# Patient Record
Sex: FEMALE | Race: BLACK OR AFRICAN AMERICAN | Employment: OTHER | URBAN - METROPOLITAN AREA
[De-identification: names, ages, dates, MRNs, and addresses within clinical notes are randomized per-mention and may not be internally consistent; named-entity substitution may affect disease eponyms.]

---

## 2017-01-09 RX ORDER — TRAMADOL HYDROCHLORIDE 50 MG/1
TABLET ORAL
Qty: 90 TAB | Refills: 2 | Status: SHIPPED | OUTPATIENT
Start: 2017-01-09 | End: 2017-02-13 | Stop reason: SDUPTHER

## 2017-01-09 NOTE — TELEPHONE ENCOUNTER
In Basket:   Lucy (pt) requesting Rx refill for: traMADol (ULTRAM) 50 mg tablet. Pt requesting to have Rx sent to [address] [place] [address] (on file).  Pt contact # [phone]

## 2017-02-13 RX ORDER — TRAMADOL HYDROCHLORIDE 50 MG/1
TABLET ORAL
Qty: 90 TAB | Refills: 2 | Status: SHIPPED | OUTPATIENT
Start: 2017-02-13 | End: 2017-05-15 | Stop reason: SDUPTHER

## 2017-02-14 ENCOUNTER — OFFICE VISIT (OUTPATIENT)
Dept: INTERNAL MEDICINE CLINIC | Age: 82
End: 2017-02-14

## 2017-02-14 ENCOUNTER — TELEPHONE (OUTPATIENT)
Dept: INTERNAL MEDICINE CLINIC | Age: 82
End: 2017-02-14

## 2017-02-14 VITALS
WEIGHT: 137.8 LBS | HEART RATE: 94 BPM | DIASTOLIC BLOOD PRESSURE: 113 MMHG | BODY MASS INDEX: 24.41 KG/M2 | OXYGEN SATURATION: 93 % | TEMPERATURE: 97.9 F | SYSTOLIC BLOOD PRESSURE: 170 MMHG | HEIGHT: 63 IN | RESPIRATION RATE: 18 BRPM

## 2017-02-14 DIAGNOSIS — Z13.39 SCREENING FOR ALCOHOLISM: ICD-10-CM

## 2017-02-14 DIAGNOSIS — G89.29 CHRONIC MIDLINE LOW BACK PAIN WITHOUT SCIATICA: ICD-10-CM

## 2017-02-14 DIAGNOSIS — K80.21 CALCULUS OF GALLBLADDER WITH BILIARY OBSTRUCTION BUT WITHOUT CHOLECYSTITIS: ICD-10-CM

## 2017-02-14 DIAGNOSIS — M81.0 OSTEOPOROSIS: ICD-10-CM

## 2017-02-14 DIAGNOSIS — M17.0 PRIMARY OSTEOARTHRITIS OF BOTH KNEES: ICD-10-CM

## 2017-02-14 DIAGNOSIS — R79.9 ABNORMAL FINDING OF BLOOD CHEMISTRY: ICD-10-CM

## 2017-02-14 DIAGNOSIS — I10 ESSENTIAL HYPERTENSION: ICD-10-CM

## 2017-02-14 DIAGNOSIS — J45.40 MODERATE PERSISTENT ASTHMA WITHOUT COMPLICATION: ICD-10-CM

## 2017-02-14 DIAGNOSIS — M65.30 TRIGGER FINGER OF RIGHT HAND, UNSPECIFIED FINGER: ICD-10-CM

## 2017-02-14 DIAGNOSIS — Z00.00 ROUTINE GENERAL MEDICAL EXAMINATION AT A HEALTH CARE FACILITY: Primary | ICD-10-CM

## 2017-02-14 DIAGNOSIS — R73.03 PREDIABETES: ICD-10-CM

## 2017-02-14 DIAGNOSIS — E78.5 DYSLIPIDEMIA: ICD-10-CM

## 2017-02-14 DIAGNOSIS — M54.50 CHRONIC MIDLINE LOW BACK PAIN WITHOUT SCIATICA: ICD-10-CM

## 2017-02-14 RX ORDER — TIZANIDINE 4 MG/1
TABLET ORAL
Refills: 2 | COMMUNITY
Start: 2017-01-18 | End: 2017-05-15 | Stop reason: ALTCHOICE

## 2017-02-14 NOTE — MR AVS SNAPSHOT
Visit Information Date & Time Provider Department Dept. Phone Encounter #  
 2/14/2017  9:30 AM [facility] [id number] Upcoming Health Maintenance Date Due DTaP/Tdap/Td series (1 - Tdap) 12/7/1954 MEDICARE YEARLY EXAM 2/8/2017 Pneumococcal 65+ Low/Medium Risk (2 of 2 - PCV13) 10/4/2017 GLAUCOMA SCREENING Q2Y 3/24/2018 Allergies as of 2/14/2017  Review Complete On: 2/14/2017 By: Rut Abad MD  
  
 Severity Noted Reaction Type Reactions Crestor [Rosuvastatin]  05/25/2016    Swelling Current Immunizations  Never Reviewed Name Date Influenza High Dose Vaccine PF 10/4/2016 Pneumococcal Polysaccharide (PPSV-23) 10/4/2016 Zoster Vaccine, Live 10/4/2016 Not reviewed this visit You Were Diagnosed With   
  
 Codes Comments Routine general medical examination at a health care facility    -  Primary ICD-10-CM: Z00.00 ICD-9-CM: V70.0 Screening for alcoholism     ICD-10-CM: Z13.89 ICD-9-CM: V79.1 Essential hypertension     ICD-10-CM: I10 
ICD-9-CM: 401.9 Moderate persistent asthma without complication     OME-63-VM: J45.40 ICD-9-CM: 493.90 Chronic midline low back pain without sciatica     ICD-10-CM: M54.5, G89.29 ICD-9-CM: 724.2, 338.29 Osteoporosis     ICD-10-CM: M81.0 ICD-9-CM: 733.00 Calculus of gallbladder with biliary obstruction but without cholecystitis     ICD-10-CM: K80.21 ICD-9-CM: 574.21 Dyslipidemia     ICD-10-CM: E78.5 ICD-9-CM: 272.4 Prediabetes     ICD-10-CM: R73.03 
ICD-9-CM: 790.29 Primary osteoarthritis of both knees     ICD-10-CM: M17.0 ICD-9-CM: 715.16 Abnormal finding of blood chemistry     ICD-10-CM: R79.9 ICD-9-CM: 790.6 Vitals BP Pulse Temp Resp Height(growth percentile) Weight(growth percentile)  (!) 170/113 (BP 1 Location: Right arm, BP Patient Position: Sitting) 94 97.9 °F (36.6 °C) (Oral) 18 5' 3\" (1.6 m) 137 lb 12.8 oz (62.5 kg) SpO2 BMI Smoking Status 93% 24.41 kg/m2 Never Smoker Vitals History BMI and BSA Data Body Mass Index Body Surface Area  
 24.41 kg/m 2 1.67 m 2 Preferred Pharmacy Pharmacy Name Phone [facility] [address] [place] [address] [phone] Your Updated Medication List  
  
   
This list is accurate as of: 2/14/17 12:11 PM.  Always use your most recent med list.  
  
  
  
  
 * ADVAIR DISKUS 500-50 mcg/dose diskus inhaler Generic drug:  fluticasone-salmeterol Take 1 Puff by inhalation two (2) times a day. * fluticasone-salmeterol 250-50 mcg/dose diskus inhaler Commonly known as:  ADVAIR Take 1 Puff by inhalation two (2) times a day. * albuterol 90 mcg/actuation inhaler Commonly known as:  PROVENTIL HFA, VENTOLIN HFA, PROAIR HFA Take 2 Puffs by inhalation every four (4) hours as needed for Wheezing. * albuterol 2.5 mg /3 mL (0.083 %) nebulizer solution Commonly known as:  PROVENTIL VENTOLIN  
3 mL by Nebulization route every four (4) hours as needed for Wheezing. alendronate 70 mg tablet Commonly known as:  FOSAMAX Take 1 Tab by mouth every Sunday. amLODIPine 5 mg tablet Commonly known as:  Ignacio Chafe Take 1 Tab by mouth daily. atenolol 25 mg tablet Commonly known as:  TENORMIN Take 1 Tab by mouth daily. Diphth, Pertus(Acell), Tetanus 2.5-8-5 Lf-mcg-Lf/0.5mL Syrg Commonly known as:  BOOSTRIX TDAP  
0.5 mL by IntraMUSCular route once for 1 dose. ferrous sulfate 325 mg (65 mg iron) tablet Take 1 Tab by mouth Daily (before breakfast). fluticasone 50 mcg/actuation nasal spray Commonly known as:  Dash Jacquelyn 2 Sprays by Both Nostrils route daily. hydrALAZINE 25 mg tablet Commonly known as:  APRESOLINE  
  
 levocetirizine 5 mg tablet Commonly known as:  Fernando Majudes Take 1 Tab by mouth daily as needed for Allergies. * levothyroxine 88 mcg tablet Commonly known as:  SYNTHROID * levothyroxine 100 mcg tablet Commonly known as:  SYNTHROID Take 1 Tab by mouth Daily (before breakfast). montelukast 10 mg tablet Commonly known as:  SINGULAIR Take 1 Tab by mouth daily. traMADol 50 mg tablet Commonly known as:  ULTRAM  
TAKE 1 TABLET BY MOUTH EVERY 8 HOURS AS NEEDED  
  
 traZODone 50 mg tablet Commonly known as:  DESYREL  
  
 VIRTUSSIN -10 mg/5 mL solution Generic drug:  guaiFENesin-codeine TAKE 5MLS BY MOUTH QID PRN FOR COUGH * Notice: This list has 6 medication(s) that are the same as other medications prescribed for you. Read the directions carefully, and ask your doctor or other care provider to review them with you. Prescriptions Sent to Pharmacy Refills Sabino Torres,, Tetanus (BOOSTRIX TDAP) 2.5-8-5 Lf-mcg-Lf/0.5mL syrg 0 Si.5 mL by IntraMUSCular route once for 1 dose. Class: Normal  
 Pharmacy: [facility] [address] [facility] Ph #: [phone] Route: IntraMUSCular We Performed the Following CBC WITH AUTOMATED DIFF [07558 CPT(R)] HEMOGLOBIN A1C WITH EAG [34944 CPT(R)] LIPID PANEL [88614 CPT(R)] METABOLIC PANEL, COMPREHENSIVE [11972 CPT(R)] SC COLLECTION VENOUS BLOOD,VENIPUNCTURE S5304310 CPT(R)] TSH 3RD GENERATION [41818 CPT(R)] URINALYSIS W/ RFLX MICROSCOPIC [83573 CPT(R)] Patient Instructions Trigger Finger: Care Instructions Your Care Instructions A trigger finger is a finger stuck in a bent position. The bent finger usually straightens out on its own. A trigger finger can be painful, but it normally is not a serious problem. Trigger fingers seem to occur more in some groups of people.  These include people who have diabetes or arthritis or who have injured their hands in the past. This problem also occurs in musicians and people who  tools often. Rest, exercises, and other things you can do at home may help your trigger finger relax so that it can bend as it should. You may get a corticosteroid shot. This can reduce swelling and pain. Your doctor may put a splint on your finger. This will give your finger some rest and avoid irritating the joint. You may need surgery if the finger keeps locking in a bent position. Follow-up care is a key part of your treatment and safety. Be sure to make and go to all appointments, and call your doctor if you are having problems. It's also a good idea to know your test results and keep a list of the medicines you take. How can you care for yourself at home? · If your doctor put a splint on your finger, wear the splint as directed. Do not remove it until your doctor says you can. · You may need to change your activities to avoid movements that irritate the finger. · If your finger is swollen, put ice or a cold pack on your finger for 10 to 20 minutes at a time. Try to do this every 1 to 2 hours for the next 3 days (when you are awake) or until the swelling goes down. Put a thin cloth between the ice and your skin. · Prop up your hand on a pillow when you ice it or anytime you sit or lie down during the next 3 days. Try to keep it above the level of your heart. This will help reduce swelling. · Take your medicines exactly as prescribed. Call your doctor if you think you are having a problem with your medicine. · Ask your doctor if you can take an over-the-counter pain medicine, such as acetaminophen (Tylenol), ibuprofen (Advil, Motrin), or naproxen (Aleve). Be safe with medicines. Read and follow all instructions on the label. · If your doctor recommends exercises, do them as directed. When should you call for help? Call your doctor now or seek immediate medical care if: 
· Your finger locks in a bent position and will not straighten. Watch closely for changes in your health, and be sure to contact your doctor if: 
· You do not get better as expected. Where can you learn more? Go to http://rudy-christine.info/. Enter M826 in the search box to learn more about \"Trigger Finger: Care Instructions. \" Current as of: May 23, 2016 Content Version: 11.1 © 0115-9057 [employer]. Care instructions adapted under license by [employer] (which disclaims liability or warranty for this information). If you have questions about a medical condition or this instruction, always ask your healthcare professional. [address] any warranty or liability for your use of this information. Introducing [facility]! Cathy Daniels introduces [employer] patient portal. Now you can access parts of your medical record, email your doctor's office, and request medication refills online. 1. In your internet browser, go to [email or web address] 2. Click on the First Time User? Click Here link in the Sign In box. You will see the New Member Sign Up page. 3. Enter your [employer] Access Code exactly as it appears below. You will not need to use this code after youve completed the sign-up process. If you do not sign up before the expiration date, you must request a new code. · [employer] Access Code: [email or web address] Expires: 5/15/2017 12:11 PM 
 
4. Enter the last four digits of your Social Security Number (xxxx) and Date of Birth (mm/dd/yyyy) as indicated and click Submit. You will be taken to the next sign-up page. 5. Create a [employer] ID. This will be your [employer] login ID and cannot be changed, so think of one that is secure and easy to remember. 6. Create a [employer] password. You can change your password at any time. 7. Enter your Password Reset Question and Answer. This can be used at a later time if you forget your password. 8. Enter your e-mail address. You will receive e-mail notification when new information is available in [address] 9. Click Sign Up. You can now view and download portions of your medical record. 10. Click the Download Summary menu link to download a portable copy of your medical information. If you have questions, please visit the Frequently Asked Questions section of the [employer] website. Remember, [employer] is NOT to be used for urgent needs. For medical emergencies, dial 911. Now available from your iPhone and Android! Please provide this summary of care documentation to your next provider. Your primary care clinician is listed as [facility]. If you have any questions after today's visit, please call [phone].

## 2017-02-14 NOTE — PROGRESS NOTES
1. Have you been to the ER, urgent care clinic since your last visit? Hospitalized since your last visit? No    2. Have you seen or consulted any other health care providers outside of the [address] [place] [address] since your last visit? Include any pap smears or colon screening. No    This is a Subsequent Medicare Annual Wellness Visit providing Personalized Prevention Plan Services (PPPS) (Performed 12 months after initial AWV and PPPS )    I have reviewed the patient's medical history in detail and updated the computerized patient record. HistoryPatient returns today ambulatory, alert and appropriate and has the capacity to give an accurate history. Patient complains of some triggering of her right thumb and index finger. She also complains of pain in her left hip. Past Medical History   Diagnosis Date    Asthma     Bereavement 3/30/16     65 yo con  - multiple myleoma     Cholelithiasis     Cough     Dyslipidemia     Hypertension     Low back pain 16    Osteoarthritis     Osteoporosis 16    Prediabetes     S/P colonoscopy 7/15     vcu    Wedge compression fx of second thoracic vertebra with routine healing 16     t11      Past Surgical History   Procedure Laterality Date    Hx gyn      Hx colonoscopy       Current Outpatient Prescriptions   Medication Sig Dispense Refill    VIRTUSSIN AC  mg/5 mL solution TAKE 5MLS BY MOUTH QID PRN FOR COUGH  2    traMADol (ULTRAM) 50 mg tablet TAKE 1 TABLET BY MOUTH EVERY 8 HOURS AS NEEDED 90 Tab 2    albuterol (PROVENTIL VENTOLIN) 2.5 mg /3 mL (0.083 %) nebulizer solution 3 mL by Nebulization route every four (4) hours as needed for Wheezing. 150 Each 11    fluticasone-salmeterol (ADVAIR) 250-50 mcg/dose diskus inhaler Take 1 Puff by inhalation two (2) times a day. 1 Inhaler 11    hydrALAZINE (APRESOLINE) 25 mg tablet   9    levocetirizine (XYZAL) 5 mg tablet Take 1 Tab by mouth daily as needed for Allergies. 30 Tab 11    levothyroxine (SYNTHROID) 88 mcg tablet       traZODone (DESYREL) 50 mg tablet       alendronate (FOSAMAX) 70 mg tablet Take 1 Tab by mouth every Sunday. 4 Tab 11    montelukast (SINGULAIR) 10 mg tablet Take 1 Tab by mouth daily. 30 Tab 11    atenolol (TENORMIN) 25 mg tablet Take 1 Tab by mouth daily. 30 Tab 11    levothyroxine (SYNTHROID) 100 mcg tablet Take 1 Tab by mouth Daily (before breakfast). 30 Tab 11    ADVAIR DISKUS 500-50 mcg/dose diskus inhaler Take 1 Puff by inhalation two (2) times a day. 3 Inhaler 11    ferrous sulfate 325 mg (65 mg iron) tablet Take 1 Tab by mouth Daily (before breakfast). 90 Tab 11    fluticasone (FLONASE) 50 mcg/actuation nasal spray 2 Sprays by Both Nostrils route daily. 3 Bottle 11    amLODIPine (NORVASC) 5 mg tablet Take 1 Tab by mouth daily. 90 Tab 11    albuterol (PROVENTIL HFA, VENTOLIN HFA, PROAIR HFA) 90 mcg/actuation inhaler Take 2 Puffs by inhalation every four (4) hours as needed for Wheezing. 3 Inhaler 11     Allergies   Allergen Reactions    Crestor [Rosuvastatin] Swelling     Family History   Problem Relation Age of Onset    Family history unknown: Yes    Cancer Son      Social History   Substance Use Topics    Smoking status: Never Smoker    Smokeless tobacco: Not on file    Alcohol use No     Patient Active Problem List   Diagnosis Code    Hypertension I10    Asthma J45.909    S/P colonoscopy Z98.890    Low back pain M54.5    ACP (advance care planning) Z71.89    Osteoporosis M81.0    Cough R05    Cholelithiasis K80.20    Dyslipidemia E78.5    Prediabetes R73.03    Osteoarthritis M19.90       Depression Risk Factor Screening:     PHQ 2 / 9, over the last two weeks 2/14/2017   Little interest or pleasure in doing things Not at all   Feeling down, depressed or hopeless Not at all   Total Score PHQ 2 0     Alcohol Risk Factor Screening:    On any occasion during the past 3 months, have you had more than 3 drinks containing alcohol? No    Do you average more than 7 drinks per week? No      Functional Ability and Level of Safety:     Hearing Loss   borderline normal-to-mild    Activities of Daily Living   Self-care. Requires assistance with: no ADLs    Fall Risk     Fall Risk Assessment, last 12 mths 2/14/2017   Able to walk? Yes   Fall in past 12 months? No     Abuse Screen   None  Patient is not abused    Review of Systems   A comprehensive review of systems was negative except for that written in the HPI. Physical Examination     Evaluation of Cognitive Function:  Mood/affect:  neutral  Appearance: age appropriate  Family member/caregiver input:     Visit Vitals    BP (!) 170/113 (BP 1 Location: Right arm, BP Patient Position: Sitting)    Pulse 94    Temp 97.9 °F (36.6 °C) (Oral)    Resp 18    Ht 5' 3\" (1.6 m)    Wt 137 lb 12.8 oz (62.5 kg)    SpO2 93%    BMI 24.41 kg/m2     General:  Alert, cooperative, no distress, appears stated age. Head:  Normocephalic, without obvious abnormality, atraumatic. Eyes:  Conjunctivae/corneas clear. PERRL, EOMs intact. Fundi benign. Ears:  Normal TMs and external ear canals both ears. Nose: Nares normal. Septum midline. Mucosa normal. No drainage or sinus tenderness. Throat: Lips, mucosa, and tongue normal. Teeth and gums normal.   Neck: Supple, symmetrical, trachea midline, no adenopathy, thyroid: no enlargement/tenderness/nodules, no carotid bruit and no JVD. Back:   Symmetric, no curvature. ROM normal. No CVA tenderness. Lungs:   Clear to auscultation bilaterally. Chest wall:  No tenderness or deformity. Heart:  Regular rate and rhythm, S1, S2 normal, no murmur, click, rub or gallop. Breast Exam:  na   Abdomen:   Soft, non-tender. Bowel sounds normal. No masses,  No organomegaly. Genitalia:  na   Rectal:     Extremities: Extremities normal, atraumatic, no cyanosis or edema. Pulses: 2+ and symmetric all extremities.    Skin: Skin color, texture, turgor normal. No rashes or lesions. Lymph nodes: Cervical, supraclavicular, and axillary nodes normal.   Neurologic: CNII-XII intact. Normal strength, sensation and reflexes throughout. Patient Care Team:  Nancy Hernandez MD as PCP - General (Internal Medicine)    Advice/Referrals/Counseling   Education and counseling provided:  Are appropriate based on today's review and evaluation    Assessment/Plan       ICD-10-CM ICD-9-CM    1. Routine general medical examination at a health care facility Z00.00 V70.0    2. Screening for alcoholism Z13.89 V79.1    3. Essential hypertension I10 401.9 Diphth, Pertus,Acell,, Tetanus (BOOSTRIX TDAP) 2.5-8-5 Lf-mcg-Lf/0.5mL syrg      URINALYSIS W/ RFLX MICROSCOPIC      CBC WITH AUTOMATED DIFF      METABOLIC PANEL, COMPREHENSIVE      LIPID PANEL      TSH 3RD GENERATION      NH COLLECTION VENOUS BLOOD,VENIPUNCTURE      HEMOGLOBIN A1C WITH EAG   4. Moderate persistent asthma without complication Q99.30 423.84    5. Chronic midline low back pain without sciatica M54.5 724.2     G89.29 338.29    6. Osteoporosis M81.0 733.00    7. Calculus of gallbladder with biliary obstruction but without cholecystitis K80.21 574.21    8. Dyslipidemia E78.5 272.4    9. Prediabetes R73.03 790.29    10. Primary osteoarthritis of both knees M17.0 715.16    11. Abnormal finding of blood chemistry  R79.9 790.6 HEMOGLOBIN A1C WITH EAG   . For the triggering we will refer her to Dr. Cynthia Baumann, hand specialist, Orthopedics. It is so uncomfortable that she is not sleeping at night. For the osteoarthritis we suggest local therapy. Appropriate laboratory studies will be requested. She will return to see us in about six months, sooner if she has any problems. Repeat blood pressure is 150/88. She will check her blood pressure in two weeks at the pharmacy or stop by here and if it remains greater than 130/80 an adjustment will be made.

## 2017-02-14 NOTE — PATIENT INSTRUCTIONS
Trigger Finger: Care Instructions  Your Care Instructions  A trigger finger is a finger stuck in a bent position. The bent finger usually straightens out on its own. A trigger finger can be painful, but it normally is not a serious problem. Trigger fingers seem to occur more in some groups of people. These include people who have diabetes or arthritis or who have injured their hands in the past. This problem also occurs in musicians and people who  tools often. Rest, exercises, and other things you can do at home may help your trigger finger relax so that it can bend as it should. You may get a corticosteroid shot. This can reduce swelling and pain. Your doctor may put a splint on your finger. This will give your finger some rest and avoid irritating the joint. You may need surgery if the finger keeps locking in a bent position. Follow-up care is a key part of your treatment and safety. Be sure to make and go to all appointments, and call your doctor if you are having problems. It's also a good idea to know your test results and keep a list of the medicines you take. How can you care for yourself at home? · If your doctor put a splint on your finger, wear the splint as directed. Do not remove it until your doctor says you can. · You may need to change your activities to avoid movements that irritate the finger. · If your finger is swollen, put ice or a cold pack on your finger for 10 to 20 minutes at a time. Try to do this every 1 to 2 hours for the next 3 days (when you are awake) or until the swelling goes down. Put a thin cloth between the ice and your skin. · Prop up your hand on a pillow when you ice it or anytime you sit or lie down during the next 3 days. Try to keep it above the level of your heart. This will help reduce swelling. · Take your medicines exactly as prescribed. Call your doctor if you think you are having a problem with your medicine.   · Ask your doctor if you can take an over-the-counter pain medicine, such as acetaminophen (Tylenol), ibuprofen (Advil, Motrin), or naproxen (Aleve). Be safe with medicines. Read and follow all instructions on the label. · If your doctor recommends exercises, do them as directed. When should you call for help? Call your doctor now or seek immediate medical care if:  · Your finger locks in a bent position and will not straighten. Watch closely for changes in your health, and be sure to contact your doctor if:  · You do not get better as expected. Where can you learn more? Go to http://rudy-christine.info/. Enter M826 in the search box to learn more about \"Trigger Finger: Care Instructions. \"  Current as of: May 23, 2016  Content Version: 11.1  © 5966-7901 [employer]. Care instructions adapted under license by [employer] (which disclaims liability or warranty for this information). If you have questions about a medical condition or this instruction, always ask your healthcare professional. [address] any warranty or liability for your use of this information.

## 2017-02-15 LAB
ALBUMIN SERPL-MCNC: 4.7 G/DL (ref 3.5–4.7)
ALBUMIN/GLOB SERPL: 1.6 {RATIO} (ref 1.1–2.5)
ALP SERPL-CCNC: 84 IU/L (ref 39–117)
ALT SERPL-CCNC: 10 IU/L (ref 0–32)
APPEARANCE UR: CLEAR
AST SERPL-CCNC: 18 IU/L (ref 0–40)
BACTERIA #/AREA URNS HPF: ABNORMAL /[HPF]
BASOPHILS # BLD AUTO: 0 X10E3/UL (ref 0–0.2)
BASOPHILS NFR BLD AUTO: 0 %
BILIRUB SERPL-MCNC: 0.8 MG/DL (ref 0–1.2)
BILIRUB UR QL STRIP: NEGATIVE
BUN SERPL-MCNC: 15 MG/DL (ref 8–27)
BUN/CREAT SERPL: 15 (ref 11–26)
CALCIUM SERPL-MCNC: 9.9 MG/DL (ref 8.7–10.3)
CASTS URNS QL MICRO: ABNORMAL /LPF
CHLORIDE SERPL-SCNC: 101 MMOL/L (ref 96–106)
CHOLEST SERPL-MCNC: 298 MG/DL (ref 100–199)
CO2 SERPL-SCNC: 25 MMOL/L (ref 18–29)
COLOR UR: YELLOW
COMMENT, 011824: ABNORMAL
CREAT SERPL-MCNC: 1.03 MG/DL (ref 0.57–1)
EOSINOPHIL # BLD AUTO: 0.5 X10E3/UL (ref 0–0.4)
EOSINOPHIL NFR BLD AUTO: 8 %
EPI CELLS #/AREA URNS HPF: ABNORMAL /HPF
ERYTHROCYTE [DISTWIDTH] IN BLOOD BY AUTOMATED COUNT: 14.2 % (ref 12.3–15.4)
EST. AVERAGE GLUCOSE BLD GHB EST-MCNC: 114 MG/DL
GLOBULIN SER CALC-MCNC: 2.9 G/DL (ref 1.5–4.5)
GLUCOSE SERPL-MCNC: 96 MG/DL (ref 65–99)
GLUCOSE UR QL: NEGATIVE
HBA1C MFR BLD: 5.6 % (ref 4.8–5.6)
HCT VFR BLD AUTO: 35.6 % (ref 34–46.6)
HDLC SERPL-MCNC: 57 MG/DL
HGB BLD-MCNC: 11.9 G/DL (ref 11.1–15.9)
HGB UR QL STRIP: NEGATIVE
IMM GRANULOCYTES # BLD: 0 X10E3/UL (ref 0–0.1)
IMM GRANULOCYTES NFR BLD: 0 %
KETONES UR QL STRIP: NEGATIVE
LDLC SERPL CALC-MCNC: 212 MG/DL (ref 0–99)
LEUKOCYTE ESTERASE UR QL STRIP: ABNORMAL
LYMPHOCYTES # BLD AUTO: 2.2 X10E3/UL (ref 0.7–3.1)
LYMPHOCYTES NFR BLD AUTO: 34 %
MCH RBC QN AUTO: 26.6 PG (ref 26.6–33)
MCHC RBC AUTO-ENTMCNC: 33.4 G/DL (ref 31.5–35.7)
MCV RBC AUTO: 80 FL (ref 79–97)
MICRO URNS: ABNORMAL
MONOCYTES # BLD AUTO: 0.4 X10E3/UL (ref 0.1–0.9)
MONOCYTES NFR BLD AUTO: 6 %
NEUTROPHILS # BLD AUTO: 3.3 X10E3/UL (ref 1.4–7)
NEUTROPHILS NFR BLD AUTO: 52 %
NITRITE UR QL STRIP: NEGATIVE
PH UR STRIP: 6 [PH] (ref 5–7.5)
PLATELET # BLD AUTO: 312 X10E3/UL (ref 150–379)
POTASSIUM SERPL-SCNC: 5.1 MMOL/L (ref 3.5–5.2)
PROT SERPL-MCNC: 7.6 G/DL (ref 6–8.5)
PROT UR QL STRIP: NEGATIVE
RBC # BLD AUTO: 4.47 X10E6/UL (ref 3.77–5.28)
RBC #/AREA URNS HPF: ABNORMAL /HPF
SODIUM SERPL-SCNC: 141 MMOL/L (ref 134–144)
SP GR UR: 1.02 (ref 1–1.03)
TRIGL SERPL-MCNC: 144 MG/DL (ref 0–149)
TSH SERPL DL<=0.005 MIU/L-ACNC: 0.01 UIU/ML (ref 0.45–4.5)
UROBILINOGEN UR STRIP-MCNC: 1 MG/DL (ref 0.2–1)
VLDLC SERPL CALC-MCNC: 29 MG/DL (ref 5–40)
WBC # BLD AUTO: 6.3 X10E3/UL (ref 3.4–10.8)
WBC #/AREA URNS HPF: ABNORMAL /HPF

## 2017-02-15 RX ORDER — ATORVASTATIN CALCIUM 20 MG/1
20 TABLET, FILM COATED ORAL DAILY
Qty: 30 TAB | Refills: 11 | Status: SHIPPED | OUTPATIENT
Start: 2017-02-15 | End: 2017-05-23 | Stop reason: SDUPTHER

## 2017-02-20 ENCOUNTER — TELEPHONE (OUTPATIENT)
Dept: INTERNAL MEDICINE CLINIC | Age: 82
End: 2017-02-20

## 2017-04-03 RX ORDER — MONTELUKAST SODIUM 10 MG/1
10 TABLET ORAL DAILY
Qty: 30 TAB | Refills: 11 | Status: SHIPPED | OUTPATIENT
Start: 2017-04-03 | End: 2018-04-27 | Stop reason: SDUPTHER

## 2017-04-21 RX ORDER — ALENDRONATE SODIUM 70 MG/1
TABLET ORAL
Qty: 4 TAB | Refills: 11 | Status: SHIPPED | OUTPATIENT
Start: 2017-04-21 | End: 2022-03-07

## 2017-05-15 ENCOUNTER — OFFICE VISIT (OUTPATIENT)
Dept: INTERNAL MEDICINE CLINIC | Age: 82
End: 2017-05-15

## 2017-05-15 VITALS
WEIGHT: 139.9 LBS | SYSTOLIC BLOOD PRESSURE: 139 MMHG | RESPIRATION RATE: 18 BRPM | DIASTOLIC BLOOD PRESSURE: 88 MMHG | OXYGEN SATURATION: 94 % | TEMPERATURE: 98.3 F | BODY MASS INDEX: 24.79 KG/M2 | HEART RATE: 90 BPM | HEIGHT: 63 IN

## 2017-05-15 DIAGNOSIS — M54.50 CHRONIC MIDLINE LOW BACK PAIN WITHOUT SCIATICA: ICD-10-CM

## 2017-05-15 DIAGNOSIS — G89.29 CHRONIC MIDLINE LOW BACK PAIN WITHOUT SCIATICA: ICD-10-CM

## 2017-05-15 DIAGNOSIS — M81.0 OSTEOPOROSIS, UNSPECIFIED OSTEOPOROSIS TYPE, UNSPECIFIED PATHOLOGICAL FRACTURE PRESENCE: ICD-10-CM

## 2017-05-15 DIAGNOSIS — I10 ESSENTIAL HYPERTENSION: Primary | ICD-10-CM

## 2017-05-15 DIAGNOSIS — E78.5 DYSLIPIDEMIA: ICD-10-CM

## 2017-05-15 RX ORDER — TRAMADOL HYDROCHLORIDE 50 MG/1
TABLET ORAL
Qty: 90 TAB | Refills: 2 | Status: SHIPPED | OUTPATIENT
Start: 2017-05-15 | End: 2017-06-27 | Stop reason: SDUPTHER

## 2017-05-15 NOTE — PROGRESS NOTES
1. Have you been to the ER, urgent care clinic since your last visit? Hospitalized since your last visit? No    2. Have you seen or consulted any other health care providers outside of the [address] [place] [address] since your last visit? Include any pap smears or colon screening.  No

## 2017-05-15 NOTE — MR AVS SNAPSHOT
Visit Information Date & Time Provider Department Dept. Phone Encounter #  
 5/15/2017  9:30 AM Vernon Calvillo [facility] [phone] [id number] Follow-up Instructions Return in about 3 months (around 8/15/2017). Follow-up and Disposition History Upcoming Health Maintenance Date Due DTaP/Tdap/Td series (1 - Tdap) 12/7/1954 INFLUENZA AGE 9 TO ADULT 8/1/2017 Pneumococcal 65+ Low/Medium Risk (2 of 2 - PCV13) 10/4/2017 MEDICARE YEARLY EXAM 2/15/2018 GLAUCOMA SCREENING Q2Y 3/24/2018 Allergies as of 5/15/2017  Review Complete On: 5/15/2017 By: Vernon Calvillo MD  
  
 Severity Noted Reaction Type Reactions Crestor [Rosuvastatin]  05/25/2016    Swelling Current Immunizations  Never Reviewed Name Date Influenza High Dose Vaccine PF 10/4/2016 Pneumococcal Polysaccharide (PPSV-23) 10/4/2016 Zoster Vaccine, Live 10/4/2016 Not reviewed this visit You Were Diagnosed With   
  
 Codes Comments Essential hypertension    -  Primary ICD-10-CM: I10 
ICD-9-CM: 401.9 Chronic midline low back pain without sciatica     ICD-10-CM: M54.5, G89.29 ICD-9-CM: 724.2, 338.29 Osteoporosis, unspecified osteoporosis type, unspecified pathological fracture presence     ICD-10-CM: M81.0 ICD-9-CM: 733.00 Dyslipidemia     ICD-10-CM: E78.5 ICD-9-CM: 272.4 Vitals BP Pulse Temp Resp Height(growth percentile) Weight(growth percentile) 139/88 (BP 1 Location: Right arm, BP Patient Position: Sitting) 90 98.3 °F (36.8 °C) (Oral) 18 5' 3\" (1.6 m) 139 lb 14.4 oz (63.5 kg) SpO2 BMI Smoking Status 94% 24.78 kg/m2 Never Smoker BMI and BSA Data Body Mass Index Body Surface Area 24.78 kg/m 2 1.68 m 2 Preferred Pharmacy Pharmacy Name Phone [facility] Count includes the [facility], [address] [facility] [address] [address] [address] [facility] [phone] Your Updated Medication List  
  
   
This list is accurate as of: 5/15/17 11:40 AM.  Always use your most recent med list.  
  
  
  
  
 * ADVAIR DISKUS 500-50 mcg/dose diskus inhaler Generic drug:  fluticasone-salmeterol Take 1 Puff by inhalation two (2) times a day. * fluticasone-salmeterol 250-50 mcg/dose diskus inhaler Commonly known as:  ADVAIR Take 1 Puff by inhalation two (2) times a day. * albuterol 90 mcg/actuation inhaler Commonly known as:  PROVENTIL HFA, VENTOLIN HFA, PROAIR HFA Take 2 Puffs by inhalation every four (4) hours as needed for Wheezing. * albuterol 2.5 mg /3 mL (0.083 %) nebulizer solution Commonly known as:  PROVENTIL VENTOLIN  
3 mL by Nebulization route every four (4) hours as needed for Wheezing. alendronate 70 mg tablet Commonly known as:  FOSAMAX TAKE 1 TABLET BY MOUTH EVERY SUNDAY  
  
 amLODIPine 5 mg tablet Commonly known as:  [place] TAKE 1 TABLET BY MOUTH DAILY  
  
 atenolol 25 mg tablet Commonly known as:  TENORMIN  
TAKE 1 TABLET BY MOUTH DAILY  
  
 atorvastatin 20 mg tablet Commonly known as:  LIPITOR Take 1 Tab by mouth daily. Diphth, Pertus(Acell), Tetanus 2.5-8-5 Lf-mcg-Lf/0.5mL Syrg Commonly known as:  BOOSTRIX TDAP  
0.5 mL by IntraMUSCular route once for 1 dose. ferrous sulfate 325 mg (65 mg iron) tablet Take 1 Tab by mouth Daily (before breakfast). fluticasone 50 mcg/actuation nasal spray Commonly known as:  FLONASE  
USE 2 SPRAYS IN BOTH NOSTRILS DAILY  
  
 hydrALAZINE 25 mg tablet Commonly known as:  APRESOLINE  
TAKE 1 TABLET BY MOUTH TWICE DAILY  
  
 levocetirizine 5 mg tablet Commonly known as:  Levorn Anis Take 1 Tab by mouth daily as needed for Allergies. levothyroxine 88 mcg tablet Commonly known as:  SYNTHROID  
TAKE 1 TABLET BY MOUTH DAILY BEFORE BREAKFAST  
  
 montelukast 10 mg tablet Commonly known as:  SINGULAIR Take 1 Tab by mouth daily. traMADol 50 mg tablet Commonly known as:  ULTRAM  
TAKE 1 TABLET BY MOUTH EVERY 8 HOURS AS NEEDED  
  
 traZODone 50 mg tablet Commonly known as:  Justin Soares * Notice: This list has 4 medication(s) that are the same as other medications prescribed for you. Read the directions carefully, and ask your doctor or other care provider to review them with you. Prescriptions Printed Refills  
 traMADol (ULTRAM) 50 mg tablet 2 Sig: TAKE 1 TABLET BY MOUTH EVERY 8 HOURS AS NEEDED Class: Print Prescriptions Sent to Pharmacy Refills Gwen Liu,, Tetanus (BOOSTRIX TDAP) 2.5-8-5 Lf-mcg-Lf/0.5mL syrg 0 Si.5 mL by IntraMUSCular route once for 1 dose. Class: Normal  
 Pharmacy: [facility] [address] [place] [address] AT [address] [facility] Ph #: [phone] Route: IntraMUSCular We Performed the Following CK [id number] CPT(R)] LIPID PANEL [53739 CPT(R)] PAIN MGMT PANEL W/REFL, UR [ODI40940 Custom] Follow-up Instructions Return in about 3 months (around 8/15/2017). To-Do List   
 05/15/2017 Imaging:  XR RIBS RT W PA CXR MIN 3 V Introducing [facility]! Aditya McLeod introduces [employer] patient portal. Now you can access parts of your medical record, email your doctor's office, and request medication refills online. 1. In your internet browser, go to [email or web address] 2. Click on the First Time User? Click Here link in the Sign In box. You will see the New Member Sign Up page. 3. Enter your [employer] Access Code exactly as it appears below. You will not need to use this code after youve completed the sign-up process. If you do not sign up before the expiration date, you must request a new code. · [employer] Access Code: [email or web address] Expires: 5/15/2017  1:11 PM 
 
4.  Enter the last four digits of your Social Security Number (xxxx) and Date of Birth (mm/dd/yyyy) as indicated and click Submit. You will be taken to the next sign-up page. 5. Create a [employer] ID. This will be your [employer] login ID and cannot be changed, so think of one that is secure and easy to remember. 6. Create a [employer] password. You can change your password at any time. 7. Enter your Password Reset Question and Answer. This can be used at a later time if you forget your password. 8. Enter your e-mail address. You will receive e-mail notification when new information is available in [address] 9. Click Sign Up. You can now view and download portions of your medical record. 10. Click the Download Summary menu link to download a portable copy of your medical information. If you have questions, please visit the Frequently Asked Questions section of the [employer] website. Remember, [employer] is NOT to be used for urgent needs. For medical emergencies, dial 911. Now available from your iPhone and Android! Please provide this summary of care documentation to your next provider. Your primary care clinician is listed as Yessenia Cardoza. If you have any questions after today's visit, please call [phone].

## 2017-05-15 NOTE — PROGRESS NOTES
[facility]  Sindy Penn MD, [address] [address]  Phone:  [phone]  Fax: [phone]       Chief Complaint   Patient presents with    Follow-up     3 month visit   . SUBJECTIVE:    Jyothi Carr is a 80 y.o. female Patient returns today ambulatory, alert and appropriate and has the capacity to give an accurate history. She has a known history of primary hypertension, prediabetes, osteoporosis on bisphosphonate, osteoarthritis, dyslipidemia, and primary hypertension. Patient complains of rib discomfort on the right under her right breast.  It has been bothering her for about a week. It is aggravated by change in position. It seemed to get better but now it has gotten a little worse. Since we last saw her she was seen by Matthew Mclean for trigger finger of the right index finger and she received a mixture of Lidocaine and a half cc of Depo-Medrol which she tolerated well and is back to normal now. Current Outpatient Prescriptions   Medication Sig Dispense Refill    Diphth, Pertus,Acell,, Tetanus (BOOSTRIX TDAP) 2.5-8-5 Lf-mcg-Lf/0.5mL syrg 0.5 mL by IntraMUSCular route once for 1 dose. 0.5 mL 0    traMADol (ULTRAM) 50 mg tablet TAKE 1 TABLET BY MOUTH EVERY 8 HOURS AS NEEDED 90 Tab 2    alendronate (FOSAMAX) 70 mg tablet TAKE 1 TABLET BY MOUTH EVERY SUNDAY 4 Tab 11    montelukast (SINGULAIR) 10 mg tablet Take 1 Tab by mouth daily.  30 Tab 11    atenolol (TENORMIN) 25 mg tablet TAKE 1 TABLET BY MOUTH DAILY 30 Tab 11    fluticasone (FLONASE) 50 mcg/actuation nasal spray USE 2 SPRAYS IN BOTH NOSTRILS DAILY 48 Bottle 11    levothyroxine (SYNTHROID) 88 mcg tablet TAKE 1 TABLET BY MOUTH DAILY BEFORE BREAKFAST 90 Tab 3    amLODIPine (NORVASC) 5 mg tablet TAKE 1 TABLET BY MOUTH DAILY 90 Tab 11    hydrALAZINE (APRESOLINE) 25 mg tablet TAKE 1 TABLET BY MOUTH TWICE DAILY 30 Tab 11    atorvastatin (LIPITOR) 20 mg tablet Take 1 Tab by mouth daily. 30 Tab 11    albuterol (PROVENTIL VENTOLIN) 2.5 mg /3 mL (0.083 %) nebulizer solution 3 mL by Nebulization route every four (4) hours as needed for Wheezing. 150 Each 11    fluticasone-salmeterol (ADVAIR) 250-50 mcg/dose diskus inhaler Take 1 Puff by inhalation two (2) times a day. 1 Inhaler 11    levocetirizine (XYZAL) 5 mg tablet Take 1 Tab by mouth daily as needed for Allergies. 30 Tab 11    traZODone (DESYREL) 50 mg tablet       ADVAIR DISKUS 500-50 mcg/dose diskus inhaler Take 1 Puff by inhalation two (2) times a day. 3 Inhaler 11    ferrous sulfate 325 mg (65 mg iron) tablet Take 1 Tab by mouth Daily (before breakfast). 90 Tab 11    albuterol (PROVENTIL HFA, VENTOLIN HFA, PROAIR HFA) 90 mcg/actuation inhaler Take 2 Puffs by inhalation every four (4) hours as needed for Wheezing.  3 Inhaler 11     Past Medical History:   Diagnosis Date    Asthma     Bereavement 3/30/16    65 yo con  - multiple myleoma     Cholelithiasis     Cough     Dyslipidemia     Hypertension     Low back pain 16    Osteoarthritis     Osteoporosis 16    Prediabetes     S/P colonoscopy 7/15    [facility]    Trigger finger, right 2017    Wedge compression fx of second thoracic vertebra with routine healing 16    t11     Past Surgical History:   Procedure Laterality Date    HX COLONOSCOPY      HX GYN       Allergies   Allergen Reactions    Crestor [Rosuvastatin] Swelling         REVIEW OF SYSTEMS:  General: negative for - chills or fever  ENT: negative for - headaches, nasal congestion or tinnitus  Respiratory: negative for - cough, hemoptysis, shortness of breath or wheezing  Cardiovascular : negative for - chest pain, edema, palpitations or shortness of breath  Gastrointestinal: negative for - abdominal pain, blood in stools, heartburn or nausea/vomiting  Genito-Urinary: no dysuria, trouble voiding, or hematuria  Musculoskeletal: negative for - gait disturbance, joint pain, joint stiffness or joint swelling  Neurological: no TIA or stroke symptoms  Hematologic: no bruises, no bleeding, no swollen glands  Integument: no lumps, mole changes, nail changes or rash  Endocrine: no malaise/lethargy or unexpected weight changes      Social History     Social History    Marital status:      Spouse name: N/A    Number of children: N/A    Years of education: N/A     Social History Main Topics    Smoking status: Never Smoker    Smokeless tobacco: None    Alcohol use No    Drug use: None    Sexual activity: Not Asked     Other Topics Concern    None     Social History Narrative     Family History   Problem Relation Age of Onset    Family history unknown: Yes    Cancer Son        OBJECTIVE:    Visit Vitals    /88 (BP 1 Location: Right arm, BP Patient Position: Sitting)    Pulse 90    Temp 98.3 °F (36.8 °C) (Oral)    Resp 18    Ht 5' 3\" (1.6 m)    Wt 139 lb 14.4 oz (63.5 kg)    SpO2 94%    BMI 24.78 kg/m2     CONSTITUTIONAL: well , well nourished, appears age appropriate  EYES: perrla, eom intact  ENMT:moist mucous membranes, pharynx clear  NECK: supple. Thyroid normal  RESPIRATORY: Chest: clear bilaterally   CARDIOVASCULAR: Heart: regular rate and rhythm  GASTROINTESTINAL: Abdomen: soft, bowel sounds active  HEMATOLOGIC: no pathological lymph nodes palpated  MUSCULOSKELETAL: Extremities: no edema, pulse 1+   INTEGUMENT: No unusual rashes or suspicious skin lesions noted. Nails appear normal.  NEUROLOGIC: non-focal exam   MENTAL STATUS: alert and oriented, appropriate affect           ASSESSMENT:  1. Essential hypertension    2. Chronic midline low back pain without sciatica    3. Osteoporosis, unspecified osteoporosis type, unspecified pathological fracture presence    4. Dyslipidemia      Patient's medical status is stable. We will take an x-ray of the ribs on the right side. She certainly has musculoskeletal pain related to one of the ribs.   I suspect it is muscle pain from the way she twisted. We will check her lipid panel today and if the cholesterol is still elevated we will increase the Lipitor. She is not having any problems with it as she had problems with the Crestor. She would like some more Tramadol. When she takes it it seems to help. She has tried aspirin and NSAID's without success. She is aware of our policies regarding narcotics. Blood pressure control is adequate. BMI is at her ideal body weight. She will return to see us in about three months, sooner if she has any problems. As discussed previously she is requesting a DNR status. PLAN:  .  Orders Placed This Encounter    XR CHEST PA LAT    XR RIBS RT W PA CXR MIN 3 V    LIPID PANEL    CK    PAIN MGMT PANEL W/REFL, UR    Diphth, Pertus,Acell,, Tetanus (BOOSTRIX TDAP) 2.5-8-5 Lf-mcg-Lf/0.5mL syrg    traMADol (ULTRAM) 50 mg tablet       Follow-up Disposition:  Return in about 3 months (around 8/15/2017). ATTENTION:   This medical record was transcribed using an electronic medical records system. Although proofread, it may and can contain electronic and spelling errors. Other human spelling and other errors may be present. Corrections may be executed at a later time. Please feel free to contact us for any clarifications as needed.

## 2017-05-17 RX ORDER — LEVOTHYROXINE SODIUM 100 UG/1
TABLET ORAL
Qty: 30 TAB | Refills: 11 | Status: SHIPPED | OUTPATIENT
Start: 2017-05-17

## 2017-05-23 LAB
AMPHETAMINES UR QL SCN: NEGATIVE NG/ML
BARBITURATES UR QL SCN: NEGATIVE NG/ML
BENZODIAZ UR QL SCN: NEGATIVE NG/ML
BZE UR QL SCN: NEGATIVE NG/ML
CANNABINOIDS UR QL SCN: NEGATIVE NG/ML
CHOLEST SERPL-MCNC: 220 MG/DL (ref 100–199)
CK SERPL-CCNC: 61 U/L (ref 24–173)
CREAT UR-MCNC: 57.3 MG/DL (ref 20–300)
FENTANYL+NORFENTANYL UR QL SCN: NEGATIVE PG/ML
HDLC SERPL-MCNC: 59 MG/DL
LDLC SERPL CALC-MCNC: 142 MG/DL (ref 0–99)
MEPERIDINE UR QL: NEGATIVE NG/ML
METHADONE UR QL SCN: NEGATIVE NG/ML
OPIATES UR QL SCN: POSITIVE NG/ML
OXYCODONE+OXYMORPHONE UR QL SCN: NEGATIVE NG/ML
PCP UR QL: NEGATIVE NG/ML
PH UR: 5.7 [PH] (ref 4.5–8.9)
PLEASE NOTE:, 733157: ABNORMAL
PROPOXYPH UR QL SCN: NEGATIVE NG/ML
SP GR UR: 1
TRAMADOL UR-MCNC: POSITIVE UG/ML
TRIGL SERPL-MCNC: 97 MG/DL (ref 0–149)
VLDLC SERPL CALC-MCNC: 19 MG/DL (ref 5–40)

## 2017-05-23 RX ORDER — ATORVASTATIN CALCIUM 40 MG/1
40 TABLET, FILM COATED ORAL DAILY
Qty: 30 TAB | Refills: 11 | Status: SHIPPED | OUTPATIENT
Start: 2017-05-23 | End: 2018-03-02 | Stop reason: SDUPTHER

## 2017-05-24 RX ORDER — LANOLIN ALCOHOL/MO/W.PET/CERES
CREAM (GRAM) TOPICAL
Qty: 90 TAB | Refills: 0 | Status: SHIPPED | OUTPATIENT
Start: 2017-05-24 | End: 2017-06-21

## 2017-06-21 ENCOUNTER — OFFICE VISIT (OUTPATIENT)
Dept: INTERNAL MEDICINE CLINIC | Age: 82
End: 2017-06-21

## 2017-06-21 VITALS
HEART RATE: 68 BPM | HEIGHT: 63 IN | DIASTOLIC BLOOD PRESSURE: 90 MMHG | BODY MASS INDEX: 24.26 KG/M2 | WEIGHT: 136.9 LBS | OXYGEN SATURATION: 95 % | TEMPERATURE: 97.9 F | SYSTOLIC BLOOD PRESSURE: 136 MMHG | RESPIRATION RATE: 16 BRPM

## 2017-06-21 DIAGNOSIS — R05.9 COUGH: Primary | ICD-10-CM

## 2017-06-21 DIAGNOSIS — I10 ESSENTIAL HYPERTENSION: ICD-10-CM

## 2017-06-21 DIAGNOSIS — M17.0 PRIMARY OSTEOARTHRITIS OF BOTH KNEES: ICD-10-CM

## 2017-06-21 NOTE — PROGRESS NOTES
SPORTS MEDICINE AND PRIMARY CARE  Mary Fay MD, [address] [address]  Phone:  [phone]  Fax: [phone]      Chief Complaint   Patient presents with    Cold Symptoms     patient states that she coughs alot at night and has nasal drip due to her sinuses. SUBECTIVE:    Marisa Rm is a 80 y.o. female Patient returns today ambulatory, alert and appropriate and has the capacity to give an accurate history. She has a known history of dyslipidemia, compensated hypothyroidism, primary hypertension and osteoarthritis and osteoporosis. For the past week and a half she has complained of a cough that is productive of mucoid sputum and rhinorrhea also with mucoid drainage. She has tried Flonase without relief. She also has a persistent cough that will not go away. Patient is seen for evaluation. Current Outpatient Prescriptions   Medication Sig Dispense Refill    PSEUDOEPHEDRINE-dextromethorphan-guaiFENesin (ROBITUSSIN-CE) 30- mg/5 mL solution Take 5 mL by mouth every four (4) hours as needed for Cough for up to 7 days. 118 Bottle 11    atorvastatin (LIPITOR) 40 mg tablet Take 1 Tab by mouth daily. 30 Tab 11    levothyroxine (SYNTHROID) 100 mcg tablet Take 1 tab every day Monday to Friday 30 Tab 11    traMADol (ULTRAM) 50 mg tablet TAKE 1 TABLET BY MOUTH EVERY 8 HOURS AS NEEDED 90 Tab 2    alendronate (FOSAMAX) 70 mg tablet TAKE 1 TABLET BY MOUTH EVERY SUNDAY 4 Tab 11    montelukast (SINGULAIR) 10 mg tablet Take 1 Tab by mouth daily.  30 Tab 11    atenolol (TENORMIN) 25 mg tablet TAKE 1 TABLET BY MOUTH DAILY 30 Tab 11    fluticasone (FLONASE) 50 mcg/actuation nasal spray USE 2 SPRAYS IN BOTH NOSTRILS DAILY 48 Bottle 11    amLODIPine (NORVASC) 5 mg tablet TAKE 1 TABLET BY MOUTH DAILY 90 Tab 11    hydrALAZINE (APRESOLINE) 25 mg tablet TAKE 1 TABLET BY MOUTH TWICE DAILY 30 Tab 11    albuterol (PROVENTIL VENTOLIN) 2.5 mg /3 mL (0.083 %) nebulizer solution 3 mL by Nebulization route every four (4) hours as needed for Wheezing. 150 Each 11    fluticasone-salmeterol (ADVAIR) 250-50 mcg/dose diskus inhaler Take 1 Puff by inhalation two (2) times a day. 1 Inhaler 11    levocetirizine (XYZAL) 5 mg tablet Take 1 Tab by mouth daily as needed for Allergies. 30 Tab 11    traZODone (DESYREL) 50 mg tablet       ADVAIR DISKUS 500-50 mcg/dose diskus inhaler Take 1 Puff by inhalation two (2) times a day. 3 Inhaler 11    albuterol (PROVENTIL HFA, VENTOLIN HFA, PROAIR HFA) 90 mcg/actuation inhaler Take 2 Puffs by inhalation every four (4) hours as needed for Wheezing. 3 Inhaler 11     Past Medical History:   Diagnosis Date    Asthma     Bereavement 3/30/16    63 yo con  - multiple myleoma     Cholelithiasis     Cough     Dyslipidemia     Hypertension     Low back pain 16    Osteoarthritis     Osteoporosis 16    Prediabetes     S/P colonoscopy 7/15    vcu    Trigger finger, right 2017    Wedge compression fx of second thoracic vertebra with routine healing 16    t11     Past Surgical History:   Procedure Laterality Date    HX COLONOSCOPY      HX GYN       Allergies   Allergen Reactions    Crestor [Rosuvastatin] Swelling    Lipitor [Atorvastatin] Nausea Only     Upset syomach       REVIEW OF SYSTEMS:   Patient complains of griping sensation with her stomach when she takes the Atorvastatin. She stopped it for a few days and started taking it again and the discomfort returned. Social History     Social History    Marital status:      Spouse name: N/A    Number of children: N/A    Years of education: N/A     Social History Main Topics    Smoking status: Never Smoker    Smokeless tobacco: Never Used    Alcohol use No    Drug use: No    Sexual activity: Not Asked     Other Topics Concern    None     Social History Narrative    .         Habits:  No smoking, E2H or drug abuse.          Social History:   The patient is . The patient lives with her daughter and her  and granddaughter. 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 The youngest is  to 58. She has twelve grandchildren and 5 great grandchildren.          Family History:  Father  at 80, natural causes. Mother  at 80 with complications of hypertension. She has two brothers and one sister, living. daughter  of cervical cancer. Patient is a [employer] and prefers not to take blood or blood products.   r  Family History   Problem Relation Age of Onset    Family history unknown: Yes    Cancer Son        OBJECTIVE:  Visit Vitals    /79 (BP 1 Location: Right arm, BP Patient Position: Sitting)    Pulse 68    Temp 97.9 °F (36.6 °C) (Oral)    Resp 16    Ht 5' 3\" (1.6 m)    Wt 136 lb 14.4 oz (62.1 kg)    SpO2 95%    BMI 24.25 kg/m2     ENT: perrla,  eom intact  NECK: supple. Thyroid normal  CHEST: clear to ascultation and percussion   HEART: regular rate and rhythm  ABD: soft, bowel sounds active  EXTREMITIES: no edema, pulse 1+     Office Visit on 05/15/2017   Component Date Value Ref Range Status    Cholesterol, total 05/15/2017 220* 100 - 199 mg/dL Final    Triglyceride 05/15/2017 97  0 - 149 mg/dL Final    HDL Cholesterol 05/15/2017 59  >39 mg/dL Final    VLDL, calculated 05/15/2017 19  5 - 40 mg/dL Final    LDL, calculated 05/15/2017 142* 0 - 99 mg/dL Final    Creatine Kinase,Total 05/15/2017 61  24 - 173 U/L Final    Amphetamine Screen, urine 05/15/2017 Negative  Rzfdjt=5547 ng/mL Final    Barbiturates Screen, urine 05/15/2017 Negative  Vcejis=949 ng/mL Final    Benzodiazepines Screen, urine 05/15/2017 Negative  Drpgws=701 ng/mL Final    Cannabinoid Screen, urine 05/15/2017 Negative  Cutoff=20 ng/mL Final    Cocaine Metab.  Screen, urine 05/15/2017 Negative  Uzzaes=509 ng/mL Final    Opiate Screen, urine 05/15/2017 Positive* Keljbj=319 ng/mL Final Comment: Opiate test includes Codeine, Morphine, Hydromorphone, Hydrocodone. Codeine                     Positive        A                         01     Codeine Confirm          649                ng/mL Oavhuh=553       01  Codeine detected; this finding is consistent with use of medications  that include Tylenol #2, #3, #4, Empirin #2, #3, #4, Robitussin A-C  Syrup, or numerous other trade or generic formulations containing  Codeine. Drugs listed are representative of common sources of the  compound detected and are not intended to include all possible  sources. Morphine                    Positive        A                         01     Morphine Confirm         102                ng/mL Igniel=984       01  Morphine detected; this finding is consistent with use of medications  that include Duromorph, MS-Contin, Roxanol, Shonda, or drugs  containing Codeine, or generic formulations. This drug may also be  detected from use of Heroin. Drugs listed are representative of  common sources of the                            compound detected and are not intended to  include all possible sources. Hydromorphone               Negative            Ukxzct=934            01  Hydrocodone                 Negative            Yhtkfy=662            01      Oxycodone/Oxymorphone, urine 05/15/2017 Negative  Tsbahx=012 ng/mL Final    Test includes Oxycodone and Oxymorphone    Phencyclidine Screen, urine 05/15/2017 Negative  Cutoff=25 ng/mL Final    Methadone Screen, urine 05/15/2017 Negative  Rgidos=175 ng/mL Final    Propoxyphene Screen, urine 05/15/2017 Negative  Qdjzew=064 ng/mL Final    Meperidine screen 05/15/2017 Negative  Mnmpdp=972 ng/mL Final    Comment: This test was developed and its performance characteristics  determined by [facility]. It has not been cleared or approved  by the Food and Drug Administration.       FENTANYL, URINE 05/15/2017 Negative  Vsefqg=3572 pg/mL Final    Comment: Test includes Fentanyl and Norfentanyl  This test was developed and its performance characteristics  determined by [employer]. It has not been cleared or approved  by the Food and Drug Administration.  Tramadol Screen, urine 05/15/2017 Positive* Eipwpf=643 Final    Comment:    Tramadol (GC/MS)         >3000              ng/mL Onjrfw=671       01  Tramadol detected; this finding can be consistent with use of  medications that include Ultram, Topalgic, Tradol, Zydol, or generic  formulations. Drugs listed are representative of common sources of the  compound detected and are not intended to include all possible  sources.  Creatinine, urine 05/15/2017 57.3  20.0 - 300.0 mg/dL Final    Specific Gravity 05/15/2017 1.005   Final    pH, urine 05/15/2017 5.7  4.5 - 8.9 Final    Please Note 05/15/2017 Comment   Final    Comment: Drug-test results should be interpreted in the context of clinical  information. Patient metabolic variables, specific drug chemistry, and  specimen characteristics can affect test outcome. Technical  consultation is available if a test result is inconsistent with an  expected outcome. ([email or web address] call toll-free  [phone])  Drug brands, if listed herein, are trademarks of their respective  owners. ASSESSMENT:  1. Cough    2. Essential hypertension    3. Primary osteoarthritis of both knees      Blood pressure elevation noted when she first came in. Repeat blood pressure is 136/90 which is acceptable. I think the symptoms she is having are compatible with allergies and suggest that is probably the etiology of the symptom. A decongestant would be helpful. Unfortunately the decongestant could increase her blood pressure by 10 or 12 points. We will try Zyrtec without decongestant and see if that will make her more comfortable. She has gone back to her different cholesterol pill and is taking that now on a regular basis.      She was taking iron tablets because she felt cold all the time. Her blood count was completely normal on the last visit and therefore we suggest she does not need iron tablets. The fact that she is cold all the time may be reflective of her thyroid and we will check her thyroid today as well as her blood count. She is on Levothyroxine 100 mcg daily and we will see if that might be a contributing concern. She will return to the office in three or four months, sooner if she needs to. PLAN:  .  Orders Placed This Encounter    PSEUDOEPHEDRINE-dextromethorphan-guaiFENesin (ROBITUSSIN-CE) 30- mg/5 mL solution       Follow-up Disposition:  Return in about 2 months (around 8/21/2017). ATTENTION:   This medical record was transcribed using an electronic medical records system. Although proofread, it may and can contain electronic and spelling errors. Other human spelling and other errors may be present. Corrections may be executed at a later time. Please feel free to contact us for any clarifications as needed.

## 2017-06-21 NOTE — MR AVS SNAPSHOT
Visit Information Date & Time Provider Department Dept. Phone Encounter #  
 6/21/2017 10:00 AM Briseida Shea [facility] [phone] [id number] Your Appointments 8/18/2017  9:00 AM  
Any with George Drew MD  
[address] [place] [address] and Primary Care [address] [facility]) [address] [facility]  
  
   
 [address] [id number] Upcoming Health Maintenance Date Due DTaP/Tdap/Td series (1 - Tdap) 12/7/1954 INFLUENZA AGE 9 TO ADULT 8/1/2017 Pneumococcal 65+ Low/Medium Risk (2 of 2 - PCV13) 10/4/2017 MEDICARE YEARLY EXAM 2/15/2018 GLAUCOMA SCREENING Q2Y 3/24/2018 Allergies as of 6/21/2017  Review Complete On: 6/21/2017 By: George Drew MD  
  
 Severity Noted Reaction Type Reactions Crestor [Rosuvastatin]  05/25/2016    Swelling Lipitor [Atorvastatin]  06/21/2017    Nausea Only Upset syomach Current Immunizations  Never Reviewed Name Date Influenza High Dose Vaccine PF 10/4/2016 Pneumococcal Polysaccharide (PPSV-23) 10/4/2016 Zoster Vaccine, Live 10/4/2016 Not reviewed this visit Vitals BP Pulse Temp Resp Height(growth percentile) Weight(growth percentile) 162/79 (BP 1 Location: Right arm, BP Patient Position: Sitting) 68 97.9 °F (36.6 °C) (Oral) 16 5' 3\" (1.6 m) 136 lb 14.4 oz (62.1 kg) SpO2 BMI Smoking Status 95% 24.25 kg/m2 Never Smoker BMI and BSA Data Body Mass Index Body Surface Area  
 24.25 kg/m 2 1.66 m 2 Preferred Pharmacy Pharmacy Name Phone [facility] [address] [place] [address] [facility] [phone] Your Updated Medication List  
  
   
This list is accurate as of: 6/21/17 12:38 PM.  Always use your most recent med list.  
  
  
  
  
 * ADVAIR DISKUS 500-50 mcg/dose diskus inhaler Generic drug:  fluticasone-salmeterol Take 1 Puff by inhalation two (2) times a day. * fluticasone-salmeterol 250-50 mcg/dose diskus inhaler Commonly known as:  ADVAIR Take 1 Puff by inhalation two (2) times a day. * albuterol 90 mcg/actuation inhaler Commonly known as:  PROVENTIL HFA, VENTOLIN HFA, PROAIR HFA Take 2 Puffs by inhalation every four (4) hours as needed for Wheezing. * albuterol 2.5 mg /3 mL (0.083 %) nebulizer solution Commonly known as:  PROVENTIL VENTOLIN  
3 mL by Nebulization route every four (4) hours as needed for Wheezing. alendronate 70 mg tablet Commonly known as:  FOSAMAX TAKE 1 TABLET BY MOUTH EVERY SUNDAY  
  
 amLODIPine 5 mg tablet Commonly known as:  Cale Tirso TAKE 1 TABLET BY MOUTH DAILY  
  
 atenolol 25 mg tablet Commonly known as:  TENORMIN  
TAKE 1 TABLET BY MOUTH DAILY  
  
 atorvastatin 40 mg tablet Commonly known as:  LIPITOR Take 1 Tab by mouth daily. fluticasone 50 mcg/actuation nasal spray Commonly known as:  FLONASE  
USE 2 SPRAYS IN BOTH NOSTRILS DAILY  
  
 hydrALAZINE 25 mg tablet Commonly known as:  APRESOLINE  
TAKE 1 TABLET BY MOUTH TWICE DAILY  
  
 levocetirizine 5 mg tablet Commonly known as:  Diantha Many Take 1 Tab by mouth daily as needed for Allergies. levothyroxine 100 mcg tablet Commonly known as:  SYNTHROID Take 1 tab every day Monday to Friday  
  
 montelukast 10 mg tablet Commonly known as:  SINGULAIR Take 1 Tab by mouth daily. traMADol 50 mg tablet Commonly known as:  ULTRAM  
TAKE 1 TABLET BY MOUTH EVERY 8 HOURS AS NEEDED  
  
 traZODone 50 mg tablet Commonly known as:  Deatra Sammy * Notice: This list has 4 medication(s) that are the same as other medications prescribed for you. Read the directions carefully, and ask your doctor or other care provider to review them with you. Introducing [facility]! Jong Sosa introduces [employer] patient portal. Now you can access parts of your medical record, email your doctor's office, and request medication refills online. 1. In your internet browser, go to [email or web address] 2. Click on the First Time User? Click Here link in the Sign In box. You will see the New Member Sign Up page. 3. Enter your [employer] Access Code exactly as it appears below. You will not need to use this code after youve completed the sign-up process. If you do not sign up before the expiration date, you must request a new code. · [employer] Access Code: [email or web address] Expires: 9/19/2017 12:38 PM 
 
4. Enter the last four digits of your Social Security Number (xxxx) and Date of Birth (mm/dd/yyyy) as indicated and click Submit. You will be taken to the next sign-up page. 5. Create a [employer] ID. This will be your [employer] login ID and cannot be changed, so think of one that is secure and easy to remember. 6. Create a [employer] password. You can change your password at any time. 7. Enter your Password Reset Question and Answer. This can be used at a later time if you forget your password. 8. Enter your e-mail address. You will receive e-mail notification when new information is available in [address] 9. Click Sign Up. You can now view and download portions of your medical record. 10. Click the Download Summary menu link to download a portable copy of your medical information. If you have questions, please visit the Frequently Asked Questions section of the [employer] website. Remember, [employer] is NOT to be used for urgent needs. For medical emergencies, dial 911. Now available from your iPhone and Android! Please provide this summary of care documentation to your next provider. Your primary care clinician is listed as Chelsea Clinton. If you have any questions after today's visit, please call [phone].

## 2017-06-21 NOTE — PROGRESS NOTES
Chief Complaint   Patient presents with    Cold Symptoms     patient states that she coughs alot at night and has nasal drip due to her sinuses. 1. Have you been to the ER, urgent care clinic since your last visit? Hospitalized since your last visit? No    2. Have you seen or consulted any other health care providers outside of the [address] [place] [address] since your last visit? Include any pap smears or colon screening.  No

## 2017-06-27 RX ORDER — TRAMADOL HYDROCHLORIDE 50 MG/1
TABLET ORAL
Qty: 90 TAB | Refills: 2 | Status: SHIPPED | OUTPATIENT
Start: 2017-06-27 | End: 2017-10-13 | Stop reason: SDUPTHER

## 2017-07-14 ENCOUNTER — OFFICE VISIT (OUTPATIENT)
Dept: INTERNAL MEDICINE CLINIC | Age: 82
End: 2017-07-14

## 2017-07-14 VITALS
DIASTOLIC BLOOD PRESSURE: 82 MMHG | RESPIRATION RATE: 16 BRPM | BODY MASS INDEX: 23.73 KG/M2 | SYSTOLIC BLOOD PRESSURE: 160 MMHG | TEMPERATURE: 98 F | HEIGHT: 63 IN | WEIGHT: 133.9 LBS | OXYGEN SATURATION: 97 % | HEART RATE: 62 BPM

## 2017-07-14 DIAGNOSIS — J45.40 MODERATE PERSISTENT ASTHMA WITHOUT COMPLICATION: ICD-10-CM

## 2017-07-14 DIAGNOSIS — I10 ESSENTIAL HYPERTENSION: Primary | ICD-10-CM

## 2017-07-14 DIAGNOSIS — R73.03 PREDIABETES: ICD-10-CM

## 2017-07-14 DIAGNOSIS — E78.5 DYSLIPIDEMIA: ICD-10-CM

## 2017-07-14 RX ORDER — ALBUTEROL SULFATE 0.83 MG/ML
2.5 SOLUTION RESPIRATORY (INHALATION)
Qty: 100 EACH | Refills: 11
Start: 2017-07-14 | End: 2018-02-02 | Stop reason: SDUPTHER

## 2017-07-14 RX ORDER — BUDESONIDE AND FORMOTEROL FUMARATE DIHYDRATE 160; 4.5 UG/1; UG/1
2 AEROSOL RESPIRATORY (INHALATION) 2 TIMES DAILY
Qty: 1 INHALER | Refills: 11 | Status: SHIPPED | OUTPATIENT
Start: 2017-07-14 | End: 2017-12-03 | Stop reason: CLARIF

## 2017-07-14 NOTE — PROGRESS NOTES
SPORTS MEDICINE AND PRIMARY CARE  Alma Price MD, [address] [address]  Phone:  [phone]  Fax: [phone]       Chief Complaint   Patient presents with   [facility] ED Follow-up     shortness of breath and cold symptoms. patient was prescrubed Prednisone 50 mg(will be finished 7/16/17) and Azithromycin 250 mg(will be finished on 7/17/17). .      SUBJECTIVE:    Jimmy Rogers is a 80 y.o. female Patient returns today ambulatory, alert and appropriate and has the capacity to give an accurate history. She has a known history of asthma, cholelithiasis, hypertension, dyslipidemia, osteoporosis, prediabetes. The cough got worse after we saw her on her last visit and her son took her to the emergency room at [facility] where she was found to have asthma by Gaby Sylvester MD and give Azithromycin and Prednisone 50 mg for five days. Currently she feels better. She still has the cough and her nose drips every now and then. Patient is seen for evaluation. Current Outpatient Prescriptions   Medication Sig Dispense Refill    albuterol (PROVENTIL VENTOLIN) 2.5 mg /3 mL (0.083 %) nebulizer solution 3 mL by Nebulization route every four (4) hours as needed for Wheezing. 100 Each 11    budesonide-formoterol (SYMBICORT) 160-4.5 mcg/actuation HFA inhaler Take 2 Puffs by inhalation two (2) times a day. 1 Inhaler 11    levocetirizine (XYZAL) 5 mg tablet TAKE 1 TABLET BY MOUTH DAILY AS NEEDED FOR ALLERGIES 30 Tab 11    traMADol (ULTRAM) 50 mg tablet TAKE 1 TABLET BY MOUTH EVERY 8 HOURS AS NEEDED 90 Tab 2    atorvastatin (LIPITOR) 40 mg tablet Take 1 Tab by mouth daily. 30 Tab 11    levothyroxine (SYNTHROID) 100 mcg tablet Take 1 tab every day Monday to Friday 30 Tab 11    alendronate (FOSAMAX) 70 mg tablet TAKE 1 TABLET BY MOUTH EVERY SUNDAY 4 Tab 11    montelukast (SINGULAIR) 10 mg tablet Take 1 Tab by mouth daily.  30 Tab 11    atenolol (TENORMIN) 25 mg tablet TAKE 1 TABLET BY MOUTH DAILY 30 Tab 11    fluticasone (FLONASE) 50 mcg/actuation nasal spray USE 2 SPRAYS IN BOTH NOSTRILS DAILY 48 Bottle 11    amLODIPine (NORVASC) 5 mg tablet TAKE 1 TABLET BY MOUTH DAILY 90 Tab 11    hydrALAZINE (APRESOLINE) 25 mg tablet TAKE 1 TABLET BY MOUTH TWICE DAILY 30 Tab 11    albuterol (PROVENTIL VENTOLIN) 2.5 mg /3 mL (0.083 %) nebulizer solution 3 mL by Nebulization route every four (4) hours as needed for Wheezing. 150 Each 11    traZODone (DESYREL) 50 mg tablet       albuterol (PROVENTIL HFA, VENTOLIN HFA, PROAIR HFA) 90 mcg/actuation inhaler Take 2 Puffs by inhalation every four (4) hours as needed for Wheezing.  3 Inhaler 11     Past Medical History:   Diagnosis Date    Asthma     Bereavement 3/30/16    65 yo con  - multiple myleoma     Cholelithiasis     Cough     Dyslipidemia     Hypertension     Low back pain 16    Osteoarthritis     Osteoporosis 16    Prediabetes     S/P colonoscopy 7/15    vcu    Trigger finger, right 2017    Wedge compression fx of second thoracic vertebra with routine healing 16    t11     Past Surgical History:   Procedure Laterality Date    HX COLONOSCOPY      HX GYN       Allergies   Allergen Reactions    Crestor [Rosuvastatin] Swelling    Lipitor [Atorvastatin] Nausea Only     Upset syomach         REVIEW OF SYSTEMS:  General: negative for - chills or fever  ENT: negative for - headaches, nasal congestion or tinnitus  Respiratory: negative for - cough, hemoptysis, shortness of breath or wheezing  Cardiovascular : negative for - chest pain, edema, palpitations or shortness of breath  Gastrointestinal: negative for - abdominal pain, blood in stools, heartburn or nausea/vomiting  Genito-Urinary: no dysuria, trouble voiding, or hematuria  Musculoskeletal: negative for - gait disturbance, joint pain, joint stiffness or joint swelling  Neurological: no TIA or stroke symptoms  Hematologic: no bruises, no bleeding, no swollen glands  Integument: no lumps, mole changes, nail changes or rash  Endocrine: no malaise/lethargy or unexpected weight changes      Social History     Social History    Marital status:      Spouse name: N/A    Number of children: N/A    Years of education: N/A     Social History Main Topics    Smoking status: Never Smoker    Smokeless tobacco: Never Used    Alcohol use No    Drug use: No    Sexual activity: Not on file     Other Topics Concern    Not on file     Social History Narrative    .         Habits:  No smoking, E2H or drug abuse.          Social History:  The patient is . The patient lives with her daughter and her  and granddaughter. 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 The youngest is  to 58. She has twelve grandchildren and 5 great grandchildren.          Family History:  Father  at 80, natural causes. Mother  at 80 with complications of hypertension. She has two brothers and one sister, living. daughter  of cervical cancer. Patient is a [employer] and prefers not to take blood or blood products. Family History   Problem Relation Age of Onset    Family history unknown: Yes    Cancer Son        OBJECTIVE:    Visit Vitals    /82 (BP 1 Location: Right arm, BP Patient Position: Sitting)    Pulse 62    Temp 98 °F (36.7 °C) (Oral)    Resp 16    Ht 5' 3\" (1.6 m)    Wt 133 lb 14.4 oz (60.7 kg)    SpO2 97%    BMI 23.72 kg/m2     CONSTITUTIONAL: well , well nourished, appears age appropriate  EYES: perrla, eom intact  ENMT:moist mucous membranes, pharynx clear  NECK: supple.  Thyroid normal  RESPIRATORY: Chest: clear bilaterally   CARDIOVASCULAR: Heart: regular rate and rhythm  GASTROINTESTINAL: Abdomen: soft, bowel sounds active  HEMATOLOGIC: no pathological lymph nodes palpated  MUSCULOSKELETAL: Extremities: no edema, pulse 1+   INTEGUMENT: No unusual rashes or suspicious skin lesions noted. Nails appear normal.  NEUROLOGIC: non-focal exam   MENTAL STATUS: alert and oriented, appropriate affect           ASSESSMENT:  1. Essential hypertension    2. Moderate persistent asthma without complication    3. Prediabetes    4. Dyslipidemia      *Patient has an acute asthmatic bronchitis. It is persisting. She has a nebulizer at home but does not have the solution. We will give her the solution for the nebulizer. We will add a nasal spray for the rhinorrhea. We will also give her a steroid inhaler which should also quiet down her bronchospasm. She will return to the office in a week or two, particularly if she is not feeling better. Blood pressure control is less than ideal.  160/80 today when we check it but perhaps related to the medications she is taking for her bronchospastic flare. BMI is at ideal body weight. She will return to the office in one or two weeks. PLAN:  .  Orders Placed This Encounter    albuterol (PROVENTIL VENTOLIN) 2.5 mg /3 mL (0.083 %) nebulizer solution    budesonide-formoterol (SYMBICORT) 160-4.5 mcg/actuation HFA inhaler       Follow-up Disposition:  Return in about 1 week (around 7/21/2017). ATTENTION:   This medical record was transcribed using an electronic medical records system. Although proofread, it may and can contain electronic and spelling errors. Other human spelling and other errors may be present. Corrections may be executed at a later time. Please feel free to contact us for any clarifications as needed.

## 2017-07-14 NOTE — PROGRESS NOTES
Chief Complaint   Patient presents with   [facility] ED Follow-up     shortness of breath and cold symptoms. patient was prescrubed Prednisone 50 mg(will be finished 7/16/17) and Azithromycin 250 mg(will be finished on 7/17/17). 1. Have you been to the ER, urgent care clinic since your last visit? Hospitalized since your last visit? Yes Reason for visit: shortness of breath(Asthma) and cold symptoms    2. Have you seen or consulted any other health care providers outside of the [address] [place] [address] since your last visit? Include any pap smears or colon screening.  Yes Where: [facility]

## 2017-07-14 NOTE — MR AVS SNAPSHOT
Visit Information Date & Time Provider Department Dept. Phone Encounter #  
 7/14/2017  9:45 AM MD Lennox Inmanbouchra [facility] Sports Medicine and Primary Care [phone] [id number] Follow-up Instructions Return in about 1 week (around 7/21/2017). Follow-up and Disposition History Your Appointments 8/18/2017  9:00 AM  
Any with Nisreen Espinal MD  
[address] [place] [address] and Primary Care [address] [place] [address]) [address] [facility]  
  
   
 [id number] Upcoming Health Maintenance Date Due DTaP/Tdap/Td series (1 - Tdap) 12/7/1954 INFLUENZA AGE 9 TO ADULT 8/1/2017 Pneumococcal 65+ Low/Medium Risk (2 of 2 - PCV13) 10/4/2017 MEDICARE YEARLY EXAM 2/15/2018 GLAUCOMA SCREENING Q2Y 3/24/2018 Allergies as of 7/14/2017  Review Complete On: 7/14/2017 By: Nisreen Espinal MD  
  
 Severity Noted Reaction Type Reactions Crestor [Rosuvastatin]  05/25/2016    Swelling Lipitor [Atorvastatin]  06/21/2017    Nausea Only Upset syomach Current Immunizations  Never Reviewed Name Date Influenza High Dose Vaccine PF 10/4/2016 Pneumococcal Polysaccharide (PPSV-23) 10/4/2016 Zoster Vaccine, Live 10/4/2016 Not reviewed this visit You Were Diagnosed With   
  
 Codes Comments Essential hypertension    -  Primary ICD-10-CM: I10 
ICD-9-CM: 401.9 Moderate persistent asthma without complication     LZA-91-PF: J45.40 ICD-9-CM: 493.90 Prediabetes     ICD-10-CM: R73.03 
ICD-9-CM: 790.29 Dyslipidemia     ICD-10-CM: E78.5 ICD-9-CM: 272.4 Vitals BP Pulse Temp Resp Height(growth percentile) Weight(growth percentile) 160/82 (BP 1 Location: Right arm, BP Patient Position: Sitting) 62 98 °F (36.7 °C) (Oral) 16 5' 3\" (1.6 m) 133 lb 14.4 oz (60.7 kg) SpO2 BMI OB Status Smoking Status 97% 23.72 kg/m2 Postmenopausal Never Smoker BMI and BSA Data Body Mass Index Body Surface Area  
 23.72 kg/m 2 1.64 m 2 Preferred Pharmacy Pharmacy Name Phone [facility] [address] [place] [address] [phone] Your Updated Medication List  
  
   
This list is accurate as of: 7/14/17 11:38 AM.  Always use your most recent med list.  
  
  
  
  
 * albuterol 90 mcg/actuation inhaler Commonly known as:  PROVENTIL HFA, VENTOLIN HFA, PROAIR HFA Take 2 Puffs by inhalation every four (4) hours as needed for Wheezing. * albuterol 2.5 mg /3 mL (0.083 %) nebulizer solution Commonly known as:  PROVENTIL VENTOLIN  
3 mL by Nebulization route every four (4) hours as needed for Wheezing. * albuterol 2.5 mg /3 mL (0.083 %) nebulizer solution Commonly known as:  PROVENTIL VENTOLIN  
3 mL by Nebulization route every four (4) hours as needed for Wheezing. alendronate 70 mg tablet Commonly known as:  FOSAMAX TAKE 1 TABLET BY MOUTH EVERY SUNDAY  
  
 amLODIPine 5 mg tablet Commonly known as:  Eugenia Matar TAKE 1 TABLET BY MOUTH DAILY  
  
 atenolol 25 mg tablet Commonly known as:  TENORMIN  
TAKE 1 TABLET BY MOUTH DAILY  
  
 atorvastatin 40 mg tablet Commonly known as:  LIPITOR Take 1 Tab by mouth daily. budesonide-formoterol 160-4.5 mcg/actuation HFA inhaler Commonly known as:  SYMBICORT Take 2 Puffs by inhalation two (2) times a day. fluticasone 50 mcg/actuation nasal spray Commonly known as:  FLONASE  
USE 2 SPRAYS IN BOTH NOSTRILS DAILY  
  
 hydrALAZINE 25 mg tablet Commonly known as:  APRESOLINE  
TAKE 1 TABLET BY MOUTH TWICE DAILY  
  
 levocetirizine 5 mg tablet Commonly known as:  Luna Sayres TAKE 1 TABLET BY MOUTH DAILY AS NEEDED FOR ALLERGIES  
  
 levothyroxine 100 mcg tablet Commonly known as:  SYNTHROID Take 1 tab every day Monday to Friday  
  
 montelukast 10 mg tablet Commonly known as:  SINGULAIR  
 Take 1 Tab by mouth daily. traMADol 50 mg tablet Commonly known as:  ULTRAM  
TAKE 1 TABLET BY MOUTH EVERY 8 HOURS AS NEEDED  
  
 traZODone 50 mg tablet Commonly known as:  Micaela Hand * Notice: This list has 3 medication(s) that are the same as other medications prescribed for you. Read the directions carefully, and ask your doctor or other care provider to review them with you. Prescriptions Sent to Pharmacy Refills  
 budesonide-formoterol (SYMBICORT) 160-4.5 mcg/actuation HFA inhaler 11 Sig: Take 2 Puffs by inhalation two (2) times a day. Class: Normal  
 Pharmacy: [facility] [address] [place] [address] AT 1500 [facility] #: [phone] Route: Inhalation Follow-up Instructions Return in about 1 week (around 7/21/2017). Introducing [facility] & HEALTH SERVICES! [facility] introduces [employer] patient portal. Now you can access parts of your medical record, email your doctor's office, and request medication refills online. 1. In your internet browser, go to [email or web address] 2. Click on the First Time User? Click Here link in the Sign In box. You will see the New Member Sign Up page. 3. Enter your [employer] Access Code exactly as it appears below. You will not need to use this code after youve completed the sign-up process. If you do not sign up before the expiration date, you must request a new code. · [employer] Access Code: [email or web address] Expires: 9/19/2017 12:38 PM 
 
4. Enter the last four digits of your Social Security Number (xxxx) and Date of Birth (mm/dd/yyyy) as indicated and click Submit. You will be taken to the next sign-up page. 5. Create a [employer] ID. This will be your [employer] login ID and cannot be changed, so think of one that is secure and easy to remember. 6. Create a [employer] password. You can change your password at any time. 7. Enter your Password Reset Question and Answer. This can be used at a later time if you forget your password. 8. Enter your e-mail address. You will receive e-mail notification when new information is available in [address] 9. Click Sign Up. You can now view and download portions of your medical record. 10. Click the Download Summary menu link to download a portable copy of your medical information. If you have questions, please visit the Frequently Asked Questions section of the [employer] website. Remember, [employer] is NOT to be used for urgent needs. For medical emergencies, dial 911. Now available from your iPhone and Android! Please provide this summary of care documentation to your next provider. Your primary care clinician is listed as Jonathan Smith. If you have any questions after today's visit, please call [phone].

## 2017-08-07 RX ORDER — METOPROLOL SUCCINATE 25 MG/1
12.5 TABLET, EXTENDED RELEASE ORAL DAILY
Qty: 30 TAB | Refills: 2 | Status: SHIPPED | OUTPATIENT
Start: 2017-08-07 | End: 2018-03-12 | Stop reason: SDUPTHER

## 2017-08-09 ENCOUNTER — TELEPHONE (OUTPATIENT)
Dept: INTERNAL MEDICINE CLINIC | Age: 82
End: 2017-08-09

## 2017-08-09 NOTE — TELEPHONE ENCOUNTER
Patient given toprol xl 12.5mg qhs to replace atenolol which is on back order. patient to return this week for a blood pressure check.

## 2017-08-18 ENCOUNTER — OFFICE VISIT (OUTPATIENT)
Dept: INTERNAL MEDICINE CLINIC | Age: 82
End: 2017-08-18

## 2017-08-18 ENCOUNTER — TELEPHONE (OUTPATIENT)
Dept: INTERNAL MEDICINE CLINIC | Age: 82
End: 2017-08-18

## 2017-08-18 VITALS
HEART RATE: 67 BPM | OXYGEN SATURATION: 97 % | SYSTOLIC BLOOD PRESSURE: 153 MMHG | RESPIRATION RATE: 16 BRPM | DIASTOLIC BLOOD PRESSURE: 80 MMHG | BODY MASS INDEX: 23.73 KG/M2 | TEMPERATURE: 97.8 F | HEIGHT: 63 IN | WEIGHT: 133.9 LBS

## 2017-08-18 DIAGNOSIS — M65.30 TRIGGER FINGER OF RIGHT HAND, UNSPECIFIED FINGER: ICD-10-CM

## 2017-08-18 DIAGNOSIS — I10 ESSENTIAL HYPERTENSION: Primary | ICD-10-CM

## 2017-08-18 DIAGNOSIS — J45.40 MODERATE PERSISTENT ASTHMA WITHOUT COMPLICATION: ICD-10-CM

## 2017-08-18 RX ORDER — FLUTICASONE PROPIONATE AND SALMETEROL 50; 250 UG/1; UG/1
POWDER RESPIRATORY (INHALATION)
Refills: 11 | COMMUNITY
Start: 2017-07-28 | End: 2017-11-17 | Stop reason: CLARIF

## 2017-08-18 RX ORDER — LANOLIN ALCOHOL/MO/W.PET/CERES
CREAM (GRAM) TOPICAL
Refills: 0 | COMMUNITY
Start: 2017-05-24 | End: 2017-11-17 | Stop reason: SDUPTHER

## 2017-08-18 RX ORDER — ATENOLOL 25 MG/1
TABLET ORAL
Refills: 11 | COMMUNITY
Start: 2017-07-02 | End: 2017-11-17 | Stop reason: DRUGHIGH

## 2017-08-18 RX ORDER — AZITHROMYCIN 250 MG/1
TABLET, FILM COATED ORAL
Refills: 0 | COMMUNITY
Start: 2017-07-11 | End: 2017-11-17 | Stop reason: ALTCHOICE

## 2017-08-18 RX ORDER — ATENOLOL 50 MG/1
TABLET ORAL
Refills: 9 | COMMUNITY
Start: 2017-08-07 | End: 2017-11-17 | Stop reason: CLARIF

## 2017-08-18 RX ORDER — POLYETHYLENE GLYCOL 3350 17 G/17G
17 POWDER, FOR SOLUTION ORAL DAILY
Qty: 527 G | Refills: 11 | Status: SHIPPED | OUTPATIENT
Start: 2017-08-18 | End: 2022-03-07

## 2017-08-18 RX ORDER — ATORVASTATIN CALCIUM 20 MG/1
TABLET, FILM COATED ORAL
Refills: 8 | COMMUNITY
Start: 2017-07-02 | End: 2018-03-02 | Stop reason: SDUPTHER

## 2017-08-18 NOTE — MR AVS SNAPSHOT
Visit Information Date & Time Provider Department Dept. Phone Encounter #  
 8/18/2017  9:00 AM Briseida Lopez 80 Sports Medicine and Primary Care [phone] [id number] Follow-up Instructions Return in about 3 months (around 11/18/2017). Follow-up and Disposition History Your Appointments 11/20/2017  9:15 AM  
ROUTINE CARE with Stacy Andino MD  
[address] [place] [address] and Primary Care Anshul Bell) Appt Note: 3m f/u  
 [address] [facility]  
  
   
 [address] [phone] Upcoming Health Maintenance Date Due DTaP/Tdap/Td series (1 - Tdap) 12/7/1954 INFLUENZA AGE 9 TO ADULT 8/1/2017 Pneumococcal 65+ Low/Medium Risk (2 of 2 - PCV13) 10/4/2017 MEDICARE YEARLY EXAM 2/15/2018 GLAUCOMA SCREENING Q2Y 3/24/2018 Allergies as of 8/18/2017  Review Complete On: 8/18/2017 By: Stacy Andino MD  
  
 Severity Noted Reaction Type Reactions Crestor [Rosuvastatin]  05/25/2016    Swelling Lipitor [Atorvastatin]  06/21/2017    Nausea Only Upset syomach Current Immunizations  Never Reviewed Name Date Influenza High Dose Vaccine PF 10/4/2016 Pneumococcal Polysaccharide (PPSV-23) 10/4/2016 Zoster Vaccine, Live 10/4/2016 Not reviewed this visit You Were Diagnosed With   
  
 Codes Comments Essential hypertension    -  Primary ICD-10-CM: I10 
ICD-9-CM: 401.9 Trigger finger of right hand, unspecified finger     ICD-10-CM: M65.30 ICD-9-CM: 727.03 Moderate persistent asthma without complication     PFS-97-DF: J45.40 ICD-9-CM: 493.90 Vitals BP Pulse Temp Resp Height(growth percentile) Weight(growth percentile) 153/80 (BP 1 Location: Right arm, BP Patient Position: Sitting) 67 97.8 °F (36.6 °C) (Oral) 16 5' 3\" (1.6 m) 133 lb 14.4 oz (60.7 kg) SpO2 BMI OB Status Smoking Status 97% 23.72 kg/m2 Postmenopausal Never Smoker Vitals History BMI and BSA Data Body Mass Index Body Surface Area  
 23.72 kg/m 2 1.64 m 2 Preferred Pharmacy Pharmacy Name Phone [facility] [address] [place] [address] [place] [phone] Your Updated Medication List  
  
   
This list is accurate as of: 8/18/17  9:33 AM.  Always use your most recent med list.  
  
  
  
  
 Seabron Brenda 250-50 mcg/dose diskus inhaler Generic drug:  fluticasone-salmeterol INL 1 PUFF PO BID * albuterol 90 mcg/actuation inhaler Commonly known as:  PROVENTIL HFA, VENTOLIN HFA, PROAIR HFA Take 2 Puffs by inhalation every four (4) hours as needed for Wheezing. * albuterol 2.5 mg /3 mL (0.083 %) nebulizer solution Commonly known as:  PROVENTIL VENTOLIN  
3 mL by Nebulization route every four (4) hours as needed for Wheezing. * albuterol 2.5 mg /3 mL (0.083 %) nebulizer solution Commonly known as:  PROVENTIL VENTOLIN  
3 mL by Nebulization route every four (4) hours as needed for Wheezing. alendronate 70 mg tablet Commonly known as:  FOSAMAX TAKE 1 TABLET BY MOUTH EVERY SUNDAY  
  
 amLODIPine 5 mg tablet Commonly known as:  Job Dub TAKE 1 TABLET BY MOUTH DAILY  
  
 * atenolol 25 mg tablet Commonly known as:  TENORMIN  
TK 1 T PO D  
  
 * atenolol 50 mg tablet Commonly known as:  TENORMIN  
  
 * atorvastatin 40 mg tablet Commonly known as:  LIPITOR Take 1 Tab by mouth daily. * atorvastatin 20 mg tablet Commonly known as:  LIPITOR TK 1 T PO D  
  
 azithromycin 250 mg tablet Commonly known as:  Brendolyn Ildefonso TK 2 TS PO TODAY THEN 1 T PO QD AFTERWARDS  
  
 budesonide-formoterol 160-4.5 mcg/actuation HFA inhaler Commonly known as:  SYMBICORT Take 2 Puffs by inhalation two (2) times a day. Diphth, Pertus(Acell), Tetanus 2.5-8-5 Lf-mcg-Lf/0.5mL Syrg Commonly known as:  BOOSTRIX TDAP  
0.5 mL by IntraMUSCular route once for 1 dose. ferrous sulfate 325 mg (65 mg iron) tablet TK 1 T PO  D  BEFORE  BREAKFAST  
  
 fluticasone 50 mcg/actuation nasal spray Commonly known as:  FLONASE  
USE 2 SPRAYS IN BOTH NOSTRILS DAILY  
  
 hydrALAZINE 25 mg tablet Commonly known as:  APRESOLINE  
TAKE 1 TABLET BY MOUTH TWICE DAILY  
  
 levocetirizine 5 mg tablet Commonly known as:  Ml Martyr TAKE 1 TABLET BY MOUTH DAILY AS NEEDED FOR ALLERGIES  
  
 levothyroxine 100 mcg tablet Commonly known as:  SYNTHROID Take 1 tab every day Monday to Friday  
  
 metoprolol succinate 25 mg XL tablet Commonly known as:  TOPROL-XL Take 0.5 Tabs by mouth daily. montelukast 10 mg tablet Commonly known as:  SINGULAIR Take 1 Tab by mouth daily. polyethylene glycol 17 gram/dose powder Commonly known as:  Jeronimo Bimler Take 17 g by mouth daily. traMADol 50 mg tablet Commonly known as:  ULTRAM  
TAKE 1 TABLET BY MOUTH EVERY 8 HOURS AS NEEDED  
  
 traZODone 50 mg tablet Commonly known as:  Dianelys Grills * Notice: This list has 7 medication(s) that are the same as other medications prescribed for you. Read the directions carefully, and ask your doctor or other care provider to review them with you. Prescriptions Sent to Pharmacy Refills  
 polyethylene glycol (MIRALAX) 17 gram/dose powder 11 Sig: Take 17 g by mouth daily. Class: Normal  
 Pharmacy: [facility] [address] [facility] Ph #: [phone] Route: Oral  
 Diphth, Pertus,Acell,, Tetanus (BOOSTRIX TDAP) 2.5-8-5 Lf-mcg-Lf/0.5mL syrg 0 Si.5 mL by IntraMUSCular route once for 1 dose. Class: Normal  
 Pharmacy: [facility] [address] [facility] Ph #: [phone] Route: IntraMUSCular We Performed the Following REFERRAL TO ORTHOPEDICS [[id number] Custom] Comments:  
 Please evaluate patient for trigger finger 4th r. Follow-up Instructions Return in about 3 months (around 11/18/2017). Referral Information Referral ID Referred By Referred To  
  
 [id number] Ralph Ramirez MD   
   [address] [place] [address] [address] [place], [facility] Phone: [phone] Fax: [phone] Visits Status Start Date End Date 1 New Request 8/18/17 8/18/18 If your referral has a status of pending review or denied, additional information will be sent to support the outcome of this decision. Introducing hospitals & HEALTH SERVICES! Ronn Stringer introduces [employer] patient portal. Now you can access parts of your medical record, email your doctor's office, and request medication refills online. 1. In your internet browser, go to [email or web address] 2. Click on the First Time User? Click Here link in the Sign In box. You will see the New Member Sign Up page. 3. Enter your [employer] Access Code exactly as it appears below. You will not need to use this code after youve completed the sign-up process. If you do not sign up before the expiration date, you must request a new code. · [employer] Access Code: [email or web address] Expires: 9/19/2017 12:38 PM 
 
4. Enter the last four digits of your Social Security Number (xxxx) and Date of Birth (mm/dd/yyyy) as indicated and click Submit. You will be taken to the next sign-up page. 5. Create a [employer] ID. This will be your [employer] login ID and cannot be changed, so think of one that is secure and easy to remember. 6. Create a [employer] password. You can change your password at any time. 7. Enter your Password Reset Question and Answer. This can be used at a later time if you forget your password. 8. Enter your e-mail address. You will receive e-mail notification when new information is available in [facility] 9. Click Sign Up. You can now view and download portions of your medical record. 10. Click the Download Summary menu link to download a portable copy of your medical information. If you have questions, please visit the Frequently Asked Questions section of the [employer] website. Remember, [employer] is NOT to be used for urgent needs. For medical emergencies, dial 911. Now available from your iPhone and Android! Please provide this summary of care documentation to your next provider. Your primary care clinician is listed as Thania Bailey. If you have any questions after today's visit, please call [phone].

## 2017-08-18 NOTE — PROGRESS NOTES
SPORTS MEDICINE AND PRIMARY CARE  Sabrina Martino MD, [address] [address]  Phone:  [phone]  Fax: [phone]      Chief Complaint   Patient presents with    Follow-up     2 month visit          SUBECTIVE:    Dash Becerra is a 80 y.o. female Patient returns today alert, appropriate, ambulatory and has the capacity to give an accurate history. She has a known history of asthma and dyslipidemia, primary hypertension, and is seen for evaluation. She complains of triggering of her right fourth digit. She reminds me that Dr. Leslee Villagomez repaired her index finger on the same hand and she'd like to see him again. Patient is seen for evaluation. Current Outpatient Prescriptions   Medication Sig Dispense Refill    atorvastatin (LIPITOR) 20 mg tablet TK 1 T PO D  8    ferrous sulfate 325 mg (65 mg iron) tablet TK 1 T PO  D  BEFORE  BREAKFAST  0    ADVAIR DISKUS 250-50 mcg/dose diskus inhaler INL 1 PUFF PO BID  11    polyethylene glycol (MIRALAX) 17 gram/dose powder Take 17 g by mouth daily. 527 g 11    Diphth, Pertus,Acell,, Tetanus (BOOSTRIX TDAP) 2.5-8-5 Lf-mcg-Lf/0.5mL syrg 0.5 mL by IntraMUSCular route once for 1 dose. 0.5 mL 0    metoprolol succinate (TOPROL-XL) 25 mg XL tablet Take 0.5 Tabs by mouth daily. 30 Tab 2    albuterol (PROVENTIL VENTOLIN) 2.5 mg /3 mL (0.083 %) nebulizer solution 3 mL by Nebulization route every four (4) hours as needed for Wheezing. 100 Each 11    budesonide-formoterol (SYMBICORT) 160-4.5 mcg/actuation HFA inhaler Take 2 Puffs by inhalation two (2) times a day. 1 Inhaler 11    levocetirizine (XYZAL) 5 mg tablet TAKE 1 TABLET BY MOUTH DAILY AS NEEDED FOR ALLERGIES 30 Tab 11    traMADol (ULTRAM) 50 mg tablet TAKE 1 TABLET BY MOUTH EVERY 8 HOURS AS NEEDED 90 Tab 2    atorvastatin (LIPITOR) 40 mg tablet Take 1 Tab by mouth daily.  30 Tab 11    levothyroxine (SYNTHROID) 100 mcg tablet Take 1 tab every day Monday to Friday 30 Tab 11    alendronate (FOSAMAX) 70 mg tablet TAKE 1 TABLET BY MOUTH EVERY  4 Tab 11    montelukast (SINGULAIR) 10 mg tablet Take 1 Tab by mouth daily. 30 Tab 11    fluticasone (FLONASE) 50 mcg/actuation nasal spray USE 2 SPRAYS IN BOTH NOSTRILS DAILY 48 Bottle 11    amLODIPine (NORVASC) 5 mg tablet TAKE 1 TABLET BY MOUTH DAILY 90 Tab 11    hydrALAZINE (APRESOLINE) 25 mg tablet TAKE 1 TABLET BY MOUTH TWICE DAILY 30 Tab 11    albuterol (PROVENTIL VENTOLIN) 2.5 mg /3 mL (0.083 %) nebulizer solution 3 mL by Nebulization route every four (4) hours as needed for Wheezing. 150 Each 11    traZODone (DESYREL) 50 mg tablet       albuterol (PROVENTIL HFA, VENTOLIN HFA, PROAIR HFA) 90 mcg/actuation inhaler Take 2 Puffs by inhalation every four (4) hours as needed for Wheezing. 3 Inhaler 11    atenolol (TENORMIN) 25 mg tablet TK 1 T PO D  11    atenolol (TENORMIN) 50 mg tablet   9    azithromycin (ZITHROMAX) 250 mg tablet TK 2 TS PO TODAY THEN 1 T PO QD AFTERWARDS  0     Past Medical History:   Diagnosis Date    Asthma     Bereavement 3/30/16    63 yo con  - multiple myleoma     Cholelithiasis     Cough     Dyslipidemia     Hypertension     Low back pain 16    Osteoarthritis     Osteoporosis 16    Prediabetes     S/P colonoscopy 7/15    [facility]    Trigger finger, right 2017    Wedge compression fx of second thoracic vertebra with routine healing 16    t11     Past Surgical History:   Procedure Laterality Date    HX COLONOSCOPY      HX GYN       Allergies   Allergen Reactions    Crestor [Rosuvastatin] Swelling    Lipitor [Atorvastatin] Nausea Only     Upset syomach       REVIEW OF SYSTEMS:   No chest pain, no shortness of breath.         Social History     Social History    Marital status:      Spouse name: N/A    Number of children: N/A    Years of education: N/A     Social History Main Topics    Smoking status: Never Smoker    Smokeless tobacco: Never Used    Alcohol use No    Drug use: No    Sexual activity: Not Asked     Other Topics Concern    None     Social History Narrative    .         Habits:  No smoking, E2H or drug abuse.          Social History:  The patient is . The patient lives with her daughter and her  and granddaughter. 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 The youngest is  to 58. She has twelve grandchildren and 5 great grandchildren.          Family History:  Father  at 80, natural causes. Mother  at 80 with complications of hypertension. She has two brothers and one sister, living. daughter  of cervical cancer. Patient is a [employer] and prefers not to take blood or blood products.   r  Family History   Problem Relation Age of Onset    Family history unknown: Yes    Cancer Son        OBJECTIVE:  Visit Vitals    /80 (BP 1 Location: Right arm, BP Patient Position: Sitting)    Pulse 67    Temp 97.8 °F (36.6 °C) (Oral)    Resp 16    Ht 5' 3\" (1.6 m)    Wt 133 lb 14.4 oz (60.7 kg)    SpO2 97%    BMI 23.72 kg/m2     ENT: perrla,  eom intact  NECK: supple. Thyroid normal  CHEST: clear to ascultation and percussion   HEART: regular rate and rhythm  ABD: soft, bowel sounds active  EXTREMITIES: no edema, pulse 1+     No visits with results within 3 Month(s) from this visit.   Latest known visit with results is:    Office Visit on 05/15/2017   Component Date Value Ref Range Status    Cholesterol, total 05/15/2017 220* 100 - 199 mg/dL Final    Triglyceride 05/15/2017 97  0 - 149 mg/dL Final    HDL Cholesterol 05/15/2017 59  >39 mg/dL Final    VLDL, calculated 05/15/2017 19  5 - 40 mg/dL Final    LDL, calculated 05/15/2017 142* 0 - 99 mg/dL Final    Creatine Kinase,Total 05/15/2017 61  24 - 173 U/L Final    Amphetamine Screen, urine 05/15/2017 Negative  Zcnodh=9889 ng/mL Final    Barbiturates Screen, urine 05/15/2017 Negative  Xqgtma=845 ng/mL Final  Benzodiazepines Screen, urine 05/15/2017 Negative  Ugictj=940 ng/mL Final    Cannabinoid Screen, urine 05/15/2017 Negative  Cutoff=20 ng/mL Final    Cocaine Metab. Screen, urine 05/15/2017 Negative  Dxksaz=948 ng/mL Final    Opiate Screen, urine 05/15/2017 Positive* Njcceq=876 ng/mL Final    Comment: Opiate test includes Codeine, Morphine, Hydromorphone, Hydrocodone. Codeine                     Positive        A                         01     Codeine Confirm          649                ng/mL Flygzb=995       01  Codeine detected; this finding is consistent with use of medications  that include Tylenol #2, #3, #4, Empirin #2, #3, #4, Robitussin A-C  Syrup, or numerous other trade or generic formulations containing  Codeine. Drugs listed are representative of common sources of the  compound detected and are not intended to include all possible  sources. Morphine                    Positive        A                         01     Morphine Confirm         102                ng/mL Rodrql=693       01  Morphine detected; this finding is consistent with use of medications  that include Duromorph, MS-Contin, Roxanol, Shonda, or drugs  containing Codeine, or generic formulations. This drug may also be  detected from use of Heroin. Drugs listed are representative of  common sources of the                            compound detected and are not intended to  include all possible sources.   Hydromorphone               Negative            Ikroow=803            01  Hydrocodone                 Negative            Yxxvef=492            01      Oxycodone/Oxymorphone, urine 05/15/2017 Negative  Zxzcxe=070 ng/mL Final    Test includes Oxycodone and Oxymorphone    Phencyclidine Screen, urine 05/15/2017 Negative  Cutoff=25 ng/mL Final    Methadone Screen, urine 05/15/2017 Negative  Desbtk=941 ng/mL Final    Propoxyphene Screen, urine 05/15/2017 Negative  Nwcier=124 ng/mL Final    Meperidine screen 05/15/2017 Negative Sobdjx=300 ng/mL Final    Comment: This test was developed and its performance characteristics  determined by [facility]. It has not been cleared or approved  by the Food and Drug Administration.  FENTANYL, URINE 05/15/2017 Negative  Tfoyao=1685 pg/mL Final    Comment: Test includes Fentanyl and Norfentanyl  This test was developed and its performance characteristics  determined by [facility]. It has not been cleared or approved  by the Food and Drug Administration.  Tramadol Screen, urine 05/15/2017 Positive* Uqdiaz=945 Final    Comment:    Tramadol (GC/MS)         >3000              ng/mL Bxhjep=878       01  Tramadol detected; this finding can be consistent with use of  medications that include Ultram, Topalgic, Tradol, Zydol, or generic  formulations. Drugs listed are representative of common sources of the  compound detected and are not intended to include all possible  sources.  Creatinine, urine 05/15/2017 57.3  20.0 - 300.0 mg/dL Final    Specific Gravity 05/15/2017 1.005   Final    pH, urine 05/15/2017 5.7  4.5 - 8.9 Final    Please Note 05/15/2017 Comment   Final    Comment: Drug-test results should be interpreted in the context of clinical  information. Patient metabolic variables, specific drug chemistry, and  specimen characteristics can affect test outcome. Technical  consultation is available if a test result is inconsistent with an  expected outcome. ([email or web address] or call toll-free  [phone])  Drug brands, if listed herein, are trademarks of their respective  owners. ASSESSMENT:  1. Essential hypertension    2. Trigger finger of right hand, unspecified finger    3. Moderate persistent asthma without complication      She has triggering of her fourth digit, for which we will refer her to orthopedics for repair. Repeat blood pressure is 142/80, which is acceptable, as I suspect it drops down to a normal range when she is at home.   We recall that Atenolol was out of stock and therefore we replaced it with metoprolol and asked her to start taking a half a tablet, which would be 12.5 mg, of the metoprolol ER succinate at bedtime. Pulse being 55 when I auscultate today, I don't want to make a change in the dosage. No evidence of bronchospasm on exam today. Patient's medical status is stable. She'll return to see us in about three months, sooner if she has any problems. PLAN:  .  Orders Placed This Encounter    REFERRAL TO ORTHOPEDICS    atenolol (TENORMIN) 25 mg tablet    atenolol (TENORMIN) 50 mg tablet    atorvastatin (LIPITOR) 20 mg tablet    azithromycin (ZITHROMAX) 250 mg tablet    ferrous sulfate 325 mg (65 mg iron) tablet    ADVAIR DISKUS 250-50 mcg/dose diskus inhaler    polyethylene glycol (MIRALAX) 17 gram/dose powder    Diphth, Pertus,Acell,, Tetanus (BOOSTRIX TDAP) 2.5-8-5 Lf-mcg-Lf/0.5mL syrg       Follow-up Disposition:  Return in about 3 months (around 11/18/2017). ATTENTION:   This medical record was transcribed using an electronic medical records system. Although proofread, it may and can contain electronic and spelling errors. Other human spelling and other errors may be present. Corrections may be executed at a later time. Please feel free to contact us for any clarifications as needed.

## 2017-08-18 NOTE — PROGRESS NOTES
1. Have you been to the ER, urgent care clinic since your last visit? Hospitalized since your last visit? No    2. Have you seen or consulted any other health care providers outside of the [address] [place] [address] since your last visit? Include any pap smears or colon screening.  No

## 2017-10-14 RX ORDER — LANOLIN ALCOHOL/MO/W.PET/CERES
CREAM (GRAM) TOPICAL
Qty: 90 TAB | Refills: 0 | Status: SHIPPED | OUTPATIENT
Start: 2017-10-14 | End: 2017-11-17 | Stop reason: ALTCHOICE

## 2017-10-14 RX ORDER — TRAMADOL HYDROCHLORIDE 50 MG/1
TABLET ORAL
Qty: 90 TAB | Refills: 2 | Status: SHIPPED | OUTPATIENT
Start: 2017-10-14 | End: 2017-10-19 | Stop reason: SDUPTHER

## 2017-10-19 RX ORDER — TRAMADOL HYDROCHLORIDE 50 MG/1
TABLET ORAL
Qty: 90 TAB | Refills: 2 | Status: SHIPPED | OUTPATIENT
Start: 2017-10-19 | End: 2018-01-22 | Stop reason: SDUPTHER

## 2017-11-17 ENCOUNTER — OFFICE VISIT (OUTPATIENT)
Dept: INTERNAL MEDICINE CLINIC | Age: 82
End: 2017-11-17

## 2017-11-17 VITALS
HEART RATE: 84 BPM | OXYGEN SATURATION: 93 % | SYSTOLIC BLOOD PRESSURE: 159 MMHG | DIASTOLIC BLOOD PRESSURE: 79 MMHG | HEIGHT: 63 IN | WEIGHT: 134.4 LBS | RESPIRATION RATE: 20 BRPM | BODY MASS INDEX: 23.81 KG/M2 | TEMPERATURE: 97.9 F

## 2017-11-17 DIAGNOSIS — M25.552 HIP PAIN, CHRONIC, LEFT: ICD-10-CM

## 2017-11-17 DIAGNOSIS — G89.29 HIP PAIN, CHRONIC, LEFT: ICD-10-CM

## 2017-11-17 DIAGNOSIS — E78.5 DYSLIPIDEMIA: ICD-10-CM

## 2017-11-17 DIAGNOSIS — M81.0 OSTEOPOROSIS, UNSPECIFIED OSTEOPOROSIS TYPE, UNSPECIFIED PATHOLOGICAL FRACTURE PRESENCE: ICD-10-CM

## 2017-11-17 DIAGNOSIS — I10 ESSENTIAL HYPERTENSION: Primary | ICD-10-CM

## 2017-11-17 RX ORDER — AMLODIPINE BESYLATE 10 MG/1
10 TABLET ORAL DAILY
Qty: 30 TAB | Refills: 11 | Status: SHIPPED | OUTPATIENT
Start: 2017-11-17 | End: 2022-03-07

## 2017-11-17 NOTE — PROGRESS NOTES
SPORTS MEDICINE AND PRIMARY CARE  [employer], MD, [address] [address]  Phone:  [phone]  Fax: [phone]       Chief Complaint   Patient presents with    Hypertension     f/u   . SUBJECTIVE:    Alf Hu is a 80 y.o. female Patient returns today with history of primary hypertension, asthma, dyslipidemia, low back pain, osteoporosis, osteoarthritis and wedge compression fracture of second thoracic vertebrae. She complains of pain in her left hip and groin area. It is not present currently, but is intermittent and an annoyance. She reminds me that I met her daughter, Jazmine Laguna, at [place] the other day. Current Outpatient Prescriptions   Medication Sig Dispense Refill    amLODIPine (NORVASC) 10 mg tablet Take 1 Tab by mouth daily. 30 Tab 11    traMADol (ULTRAM) 50 mg tablet TAKE 1 TABLET BY MOUTH EVERY 8 HOURS AS NEEDED 90 Tab 2    atorvastatin (LIPITOR) 20 mg tablet TK 1 T PO D  8    polyethylene glycol (MIRALAX) 17 gram/dose powder Take 17 g by mouth daily. 527 g 11    metoprolol succinate (TOPROL-XL) 25 mg XL tablet Take 0.5 Tabs by mouth daily. 30 Tab 2    albuterol (PROVENTIL VENTOLIN) 2.5 mg /3 mL (0.083 %) nebulizer solution 3 mL by Nebulization route every four (4) hours as needed for Wheezing. 100 Each 11    budesonide-formoterol (SYMBICORT) 160-4.5 mcg/actuation HFA inhaler Take 2 Puffs by inhalation two (2) times a day. 1 Inhaler 11    levocetirizine (XYZAL) 5 mg tablet TAKE 1 TABLET BY MOUTH DAILY AS NEEDED FOR ALLERGIES 30 Tab 11    atorvastatin (LIPITOR) 40 mg tablet Take 1 Tab by mouth daily. 30 Tab 11    levothyroxine (SYNTHROID) 100 mcg tablet Take 1 tab every day Monday to Friday 30 Tab 11    alendronate (FOSAMAX) 70 mg tablet TAKE 1 TABLET BY MOUTH EVERY SUNDAY 4 Tab 11    montelukast (SINGULAIR) 10 mg tablet Take 1 Tab by mouth daily.  30 Tab 11    fluticasone (FLONASE) 50 mcg/actuation nasal spray USE 2 SPRAYS IN BOTH NOSTRILS DAILY 48 Bottle 11    hydrALAZINE (APRESOLINE) 25 mg tablet TAKE 1 TABLET BY MOUTH TWICE DAILY 30 Tab 11    albuterol (PROVENTIL VENTOLIN) 2.5 mg /3 mL (0.083 %) nebulizer solution 3 mL by Nebulization route every four (4) hours as needed for Wheezing. 150 Each 11    traZODone (DESYREL) 50 mg tablet       albuterol (PROVENTIL HFA, VENTOLIN HFA, PROAIR HFA) 90 mcg/actuation inhaler Take 2 Puffs by inhalation every four (4) hours as needed for Wheezing.  3 Inhaler 11     Past Medical History:   Diagnosis Date    Asthma     Bereavement 3/30/16    63 yo con  - multiple myleoma     Cholelithiasis     Cough     Dyslipidemia     Hip pain, chronic, left 2017    Hypertension     Low back pain 16    Osteoarthritis     Osteoporosis 16    Prediabetes     S/P colonoscopy 7/15    [facility]    Trigger finger, right 2017    Wedge compression fx of second thoracic vertebra with routine healing 16    t11     Past Surgical History:   Procedure Laterality Date    HX COLONOSCOPY      HX GYN       Allergies   Allergen Reactions    Crestor [Rosuvastatin] Swelling    Lipitor [Atorvastatin] Nausea Only     Upset syomach         REVIEW OF SYSTEMS:  General: negative for - chills or fever  ENT: negative for - headaches, nasal congestion or tinnitus  Respiratory: negative for - cough, hemoptysis, shortness of breath or wheezing  Cardiovascular : negative for - chest pain, edema, palpitations or shortness of breath  Gastrointestinal: negative for - abdominal pain, blood in stools, heartburn or nausea/vomiting  Genito-Urinary: no dysuria, trouble voiding, or hematuria  Musculoskeletal: negative for - gait disturbance, joint pain, joint stiffness or joint swelling  Neurological: no TIA or stroke symptoms  Hematologic: no bruises, no bleeding, no swollen glands  Integument: no lumps, mole changes, nail changes or rash  Endocrine: no malaise/lethargy or unexpected weight changes      Social History     Social History    Marital status:      Spouse name: N/A    Number of children: N/A    Years of education: N/A     Social History Main Topics    Smoking status: Never Smoker    Smokeless tobacco: Never Used    Alcohol use No    Drug use: No    Sexual activity: Not Asked     Other Topics Concern    None     Social History Narrative    .         Habits:  No smoking, E2H or drug abuse.          Social History:  The patient is . The patient lives with her daughter and her  and granddaughter. 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 The youngest is  to 58. She has twelve grandchildren and 5 great grandchildren.          Family History:  Father  at 80, natural causes. Mother  at 80 with complications of hypertension. She has two brothers and one sister, living. daughter  of cervical cancer. Patient is a [employer] and prefers not to take blood or blood products. Family History   Problem Relation Age of Onset    Family history unknown: Yes    Cancer Son        OBJECTIVE:    Visit Vitals    /79    Pulse 84    Temp 97.9 °F (36.6 °C) (Oral)    Resp 20    Ht 5' 3\" (1.6 m)    Wt 134 lb 6.4 oz (61 kg)    SpO2 93%    BMI 23.81 kg/m2     CONSTITUTIONAL: well , well nourished, appears age appropriate  EYES: perrla, eom intact  ENMT:moist mucous membranes, pharynx clear  NECK: supple. Thyroid normal  RESPIRATORY: Chest: clear bilaterally   CARDIOVASCULAR: Heart: regular rate and rhythm  GASTROINTESTINAL: Abdomen: soft, bowel sounds active  HEMATOLOGIC: no pathological lymph nodes palpated  MUSCULOSKELETAL: Extremities: no edema, pulse 1+   INTEGUMENT: No unusual rashes or suspicious skin lesions noted. Nails appear normal.  NEUROLOGIC: non-focal exam   MENTAL STATUS: alert and oriented, appropriate affect           ASSESSMENT:  1. Essential hypertension    2.  Osteoporosis, unspecified osteoporosis type, unspecified pathological fracture presence    3. Hip pain, chronic, left    4. Dyslipidemia      In view of the history of osteoporosis, on a biphosphate currently, will ask for an xray of the lumbar spine to be sure it's not referred pain, as well as left hip. Her blood pressure is a little higher than we'd like to see it today. Repeat blood pressure is 152/80 and therefore will make an adjustment in her medications. She is currently on Metoprolol 25 q.h.s., Norvasc 5 mg daily and Hydralazine 25 b.i.d. Will increase the Amlodipine to 10 mg daily. LDL cholesterol was elevated at 142 in May. She is currently taking Atorvastatin 40 mg daily and we will check her cholesterol to see if she is approaching goal. She had problems with Crestor as we recall. Her BMI represents ideal body weight. She'll return to the office in a month for blood pressure evaluation and in three to four months to see me. PLAN:  .  Orders Placed This Encounter    XR SPINE LUMB 2 OR 3 V    XR HIP LT W OR WO PELV 2-3 VWS    LIPID PANEL    APOLIPOPROTEIN B    CRP, HIGH SENSITIVITY    RENAL FUNCTION PANEL    amLODIPine (NORVASC) 10 mg tablet       Follow-up Disposition:  Return in about 4 weeks (around 12/15/2017). ATTENTION:   This medical record was transcribed using an electronic medical records system. Although proofread, it may and can contain electronic and spelling errors. Other human spelling and other errors may be present. Corrections may be executed at a later time. Please feel free to contact us for any clarifications as needed.

## 2017-11-17 NOTE — MR AVS SNAPSHOT
Visit Information Date & Time Provider Department Dept. Phone Encounter #  
 11/17/2017 11:30 AM Briseida Acevedo [facility] [phone] [id number] Follow-up Instructions Return in about 4 weeks (around 12/15/2017). Follow-up and Disposition History Your Appointments 12/15/2017  9:00 AM  
Any with Hunter Kenney MD  
[address] [place] [address] and Primary Care [address] [place] [address]) Appt Note: 1 month f/u  
 [address] [facility]  
  
   
 [address] [id number] Upcoming Health Maintenance Date Due DTaP/Tdap/Td series (1 - Tdap) 12/7/1954 Pneumococcal 65+ Low/Medium Risk (2 of 2 - PCV13) 10/4/2017 MEDICARE YEARLY EXAM 2/15/2018 GLAUCOMA SCREENING Q2Y 3/24/2018 Allergies as of 11/17/2017  Review Complete On: 11/17/2017 By: Hunter Kenney MD  
  
 Severity Noted Reaction Type Reactions Crestor [Rosuvastatin]  05/25/2016    Swelling Lipitor [Atorvastatin]  06/21/2017    Nausea Only Upset syomach Current Immunizations  Never Reviewed Name Date Influenza High Dose Vaccine PF 10/4/2016 Pneumococcal Polysaccharide (PPSV-23) 10/4/2016 Zoster Vaccine, Live 10/4/2016 Not reviewed this visit You Were Diagnosed With   
  
 Codes Comments Essential hypertension    -  Primary ICD-10-CM: I10 
ICD-9-CM: 401.9 Osteoporosis, unspecified osteoporosis type, unspecified pathological fracture presence     ICD-10-CM: M81.0 ICD-9-CM: 733.00 Hip pain, chronic, left     ICD-10-CM: M25.552, G89.29 ICD-9-CM: 719.45, 338.29 Dyslipidemia     ICD-10-CM: E78.5 ICD-9-CM: 272.4 Vitals BP Pulse Temp Resp Height(growth percentile) Weight(growth percentile) 159/79 84 97.9 °F (36.6 °C) (Oral) 20 5' 3\" (1.6 m) 134 lb 6.4 oz (61 kg) SpO2 BMI OB Status Smoking Status 93% 23.81 kg/m2 Postmenopausal Never Smoker Vitals History BMI and BSA Data Body Mass Index Body Surface Area  
 23.81 kg/m 2 1.65 m 2 Preferred Pharmacy Pharmacy Name Phone [facility] [address] [place] [phone] Your Updated Medication List  
  
   
This list is accurate as of: 11/17/17  1:36 PM.  Always use your most recent med list.  
  
  
  
  
 * albuterol 90 mcg/actuation inhaler Commonly known as:  PROVENTIL HFA, VENTOLIN HFA, PROAIR HFA Take 2 Puffs by inhalation every four (4) hours as needed for Wheezing. * albuterol 2.5 mg /3 mL (0.083 %) nebulizer solution Commonly known as:  PROVENTIL VENTOLIN  
3 mL by Nebulization route every four (4) hours as needed for Wheezing. * albuterol 2.5 mg /3 mL (0.083 %) nebulizer solution Commonly known as:  PROVENTIL VENTOLIN  
3 mL by Nebulization route every four (4) hours as needed for Wheezing. alendronate 70 mg tablet Commonly known as:  FOSAMAX TAKE 1 TABLET BY MOUTH EVERY SUNDAY  
  
 amLODIPine 10 mg tablet Commonly known as:  Wandra Kelly Take 1 Tab by mouth daily. * atorvastatin 40 mg tablet Commonly known as:  LIPITOR Take 1 Tab by mouth daily. * atorvastatin 20 mg tablet Commonly known as:  LIPITOR TK 1 T PO D  
  
 budesonide-formoterol 160-4.5 mcg/actuation Hfaa Commonly known as:  SYMBICORT Take 2 Puffs by inhalation two (2) times a day. fluticasone 50 mcg/actuation nasal spray Commonly known as:  FLONASE  
USE 2 SPRAYS IN BOTH NOSTRILS DAILY  
  
 hydrALAZINE 25 mg tablet Commonly known as:  APRESOLINE  
TAKE 1 TABLET BY MOUTH TWICE DAILY  
  
 levocetirizine 5 mg tablet Commonly known as:  Kaity Downs TAKE 1 TABLET BY MOUTH DAILY AS NEEDED FOR ALLERGIES  
  
 levothyroxine 100 mcg tablet Commonly known as:  SYNTHROID Take 1 tab every day Monday to Friday  
  
 metoprolol succinate 25 mg XL tablet Commonly known as:  TOPROL-XL Take 0.5 Tabs by mouth daily. montelukast 10 mg tablet Commonly known as:  SINGULAIR Take 1 Tab by mouth daily. polyethylene glycol 17 gram/dose powder Commonly known as:  Raissa Kehr Take 17 g by mouth daily. traMADol 50 mg tablet Commonly known as:  ULTRAM  
TAKE 1 TABLET BY MOUTH EVERY 8 HOURS AS NEEDED  
  
 traZODone 50 mg tablet Commonly known as:  Ayad Spicer * Notice: This list has 5 medication(s) that are the same as other medications prescribed for you. Read the directions carefully, and ask your doctor or other care provider to review them with you. Prescriptions Sent to Pharmacy Refills  
 amLODIPine (NORVASC) 10 mg tablet 11 Sig: Take 1 Tab by mouth daily. Class: Normal  
 Pharmacy: [facility] [address] [facility] #: [phone] Route: Oral  
  
We Performed the Following APOLIPOPROTEIN B [id number] CPT(R)] CRP, HIGH SENSITIVITY [01414 CPT(R)] LIPID PANEL [43003 CPT(R)] VT COLLECTION VENOUS BLOOD,VENIPUNCTURE [id number] CPT(R)] RENAL FUNCTION PANEL [13904 CPT(R)] Follow-up Instructions Return in about 4 weeks (around 12/15/2017). To-Do List   
 11/17/2017 Imaging:  XR HIP LT W OR WO PELV 2-3 VWS   
  
 11/17/2017 Imaging:  XR SPINE LUMB 2 OR 3 V Introducing [facility] HE[facility]! [facility] introduces Revolymer patient portal. Now you can access parts of your medical record, email your doctor's office, and request medication refills online. 1. In your internet browser, go to [email or web address] 2. Click on the First Time User? Click Here link in the Sign In box. You will see the New Member Sign Up page. 3. Enter your Revolymer Access Code exactly as it appears below. You will not need to use this code after youve completed the sign-up process.  If you do not sign up before the expiration date, you must request a new code. · Driftrock Access Code: [email or web address] Expires: 2/15/2018  1:36 PM 
 
4. Enter the last four digits of your Social Security Number (xxxx) and Date of Birth (mm/dd/yyyy) as indicated and click Submit. You will be taken to the next sign-up page. 5. Create a Driftrock ID. This will be your Driftrock login ID and cannot be changed, so think of one that is secure and easy to remember. 6. Create a Driftrock password. You can change your password at any time. 7. Enter your Password Reset Question and Answer. This can be used at a later time if you forget your password. 8. Enter your e-mail address. You will receive e-mail notification when new information is available in [address]. 9. Click Sign Up. You can now view and download portions of your medical record. 10. Click the Download Summary menu link to download a portable copy of your medical information. If you have questions, please visit the Frequently Asked Questions section of the Driftrock website. Remember, Driftrock is NOT to be used for urgent needs. For medical emergencies, dial 911. Now available from your iPhone and Android! Please provide this summary of care documentation to your next provider. Your primary care clinician is listed as Teofilo Collet. If you have any questions after today's visit, please call [phone].

## 2017-11-17 NOTE — PROGRESS NOTES
1. Have you been to the ER, urgent care clinic since your last visit? Hospitalized since your last visit? No    2. Have you seen or consulted any other health care providers outside of the [address] [place] [address] since your last visit? Include any pap smears or colon screening.  No

## 2017-11-18 LAB
ALBUMIN SERPL-MCNC: 4.3 G/DL (ref 3.5–4.7)
APO B SERPL-MCNC: 116 MG/DL (ref 54–133)
BUN SERPL-MCNC: 17 MG/DL (ref 8–27)
BUN/CREAT SERPL: 16 (ref 12–28)
CALCIUM SERPL-MCNC: 9.6 MG/DL (ref 8.7–10.3)
CHLORIDE SERPL-SCNC: 98 MMOL/L (ref 96–106)
CHOLEST SERPL-MCNC: 212 MG/DL (ref 100–199)
CO2 SERPL-SCNC: 27 MMOL/L (ref 18–29)
CREAT SERPL-MCNC: 1.04 MG/DL (ref 0.57–1)
CRP SERPL HS-MCNC: 5.69 MG/L (ref 0–3)
GFR SERPLBLD CREATININE-BSD FMLA CKD-EPI: 50 ML/MIN/1.73
GFR SERPLBLD CREATININE-BSD FMLA CKD-EPI: 57 ML/MIN/1.73
GLUCOSE SERPL-MCNC: 97 MG/DL (ref 65–99)
HDLC SERPL-MCNC: 59 MG/DL
LDLC SERPL CALC-MCNC: 132 MG/DL (ref 0–99)
PHOSPHATE SERPL-MCNC: 3.4 MG/DL (ref 2.5–4.5)
POTASSIUM SERPL-SCNC: 5.1 MMOL/L (ref 3.5–5.2)
SODIUM SERPL-SCNC: 140 MMOL/L (ref 134–144)
TRIGL SERPL-MCNC: 104 MG/DL (ref 0–149)
VLDLC SERPL CALC-MCNC: 21 MG/DL (ref 5–40)

## 2017-11-18 RX ORDER — EZETIMIBE 10 MG/1
10 TABLET ORAL DAILY
Qty: 30 TAB | Refills: 11 | Status: SHIPPED | OUTPATIENT
Start: 2017-11-18 | End: 2022-03-07

## 2018-01-19 ENCOUNTER — OFFICE VISIT (OUTPATIENT)
Dept: INTERNAL MEDICINE CLINIC | Age: 83
End: 2018-01-19

## 2018-01-19 VITALS
HEIGHT: 63 IN | TEMPERATURE: 97.8 F | SYSTOLIC BLOOD PRESSURE: 127 MMHG | OXYGEN SATURATION: 94 % | WEIGHT: 133.1 LBS | BODY MASS INDEX: 23.58 KG/M2 | RESPIRATION RATE: 20 BRPM | DIASTOLIC BLOOD PRESSURE: 75 MMHG | HEART RATE: 85 BPM

## 2018-01-19 NOTE — PROGRESS NOTES
1. Have you been to the ER, urgent care clinic since your last visit? Hospitalized since your last visit? No    2. Have you seen or consulted any other health care providers outside of the [address] [place] [address] since your last visit? Include any pap smears or colon screening.  No       Cold symptoms

## 2018-01-22 DIAGNOSIS — G89.29 HIP PAIN, CHRONIC, LEFT: Primary | ICD-10-CM

## 2018-01-22 DIAGNOSIS — M25.552 HIP PAIN, CHRONIC, LEFT: Primary | ICD-10-CM

## 2018-01-22 RX ORDER — TRAMADOL HYDROCHLORIDE 50 MG/1
TABLET ORAL
Qty: 90 TAB | Refills: 2 | Status: CANCELLED | OUTPATIENT
Start: 2018-01-22

## 2018-01-22 RX ORDER — TRAMADOL HYDROCHLORIDE 50 MG/1
TABLET ORAL
Qty: 90 TAB | Refills: 2 | Status: SHIPPED | OUTPATIENT
Start: 2018-01-22 | End: 2018-04-05 | Stop reason: SDUPTHER

## 2018-02-02 ENCOUNTER — OFFICE VISIT (OUTPATIENT)
Dept: INTERNAL MEDICINE CLINIC | Age: 83
End: 2018-02-02

## 2018-02-02 VITALS
TEMPERATURE: 98.1 F | DIASTOLIC BLOOD PRESSURE: 68 MMHG | HEIGHT: 63 IN | WEIGHT: 130.4 LBS | RESPIRATION RATE: 20 BRPM | OXYGEN SATURATION: 95 % | HEART RATE: 100 BPM | BODY MASS INDEX: 23.11 KG/M2 | SYSTOLIC BLOOD PRESSURE: 121 MMHG

## 2018-02-02 DIAGNOSIS — I10 ESSENTIAL HYPERTENSION: Primary | ICD-10-CM

## 2018-02-02 DIAGNOSIS — E78.5 DYSLIPIDEMIA: ICD-10-CM

## 2018-02-02 DIAGNOSIS — R79.9 ABNORMAL FINDING OF BLOOD CHEMISTRY: ICD-10-CM

## 2018-02-02 DIAGNOSIS — I45.9 SKIPPED HEART BEATS: ICD-10-CM

## 2018-02-02 DIAGNOSIS — R73.03 PREDIABETES: ICD-10-CM

## 2018-02-02 DIAGNOSIS — R05.9 COUGH: ICD-10-CM

## 2018-02-02 DIAGNOSIS — M17.0 PRIMARY OSTEOARTHRITIS OF BOTH KNEES: ICD-10-CM

## 2018-02-02 RX ORDER — ALBUTEROL SULFATE 0.83 MG/ML
2.5 SOLUTION RESPIRATORY (INHALATION)
Qty: 100 EACH | Refills: 11 | Status: SHIPPED | OUTPATIENT
Start: 2018-02-02 | End: 2022-03-07

## 2018-02-02 RX ORDER — AZITHROMYCIN 250 MG/1
250 TABLET, FILM COATED ORAL SEE ADMIN INSTRUCTIONS
Qty: 6 TAB | Refills: 1 | Status: SHIPPED | OUTPATIENT
Start: 2018-02-02 | End: 2018-02-07

## 2018-02-02 RX ORDER — ALBUTEROL SULFATE 0.83 MG/ML
2.5 SOLUTION RESPIRATORY (INHALATION)
Qty: 150 EACH | Refills: 11 | Status: SHIPPED | OUTPATIENT
Start: 2018-02-02 | End: 2018-02-02

## 2018-02-02 NOTE — MR AVS SNAPSHOT
[address] [place] [address]. [address] [id number] 
[phone] Patient: Debora Mello MRN: [id number] ZTN:[id number] Visit Information Date & Time Provider Department Dept. Phone Encounter #  
 2/2/2018 11:45 AM MD Justa Berg [facility] and [place] [id number] Follow-up Instructions Return in about 4 months (around 6/2/2018). Follow-up and Disposition History Upcoming Health Maintenance Date Due  
 GLAUCOMA SCREENING Q2Y 3/24/2018 MEDICARE YEARLY EXAM 1/20/2019 DTaP/Tdap/Td series (2 - Td) 1/19/2028 Allergies as of 2/2/2018  Review Complete On: 2/2/2018 By: Nenita Laguna MD  
  
 Severity Noted Reaction Type Reactions Crestor [Rosuvastatin]  05/25/2016    Swelling Current Immunizations  Never Reviewed Name Date Influenza High Dose Vaccine PF 10/4/2016 Pneumococcal Polysaccharide (PPSV-23) 10/4/2016 Zoster Vaccine, Live 10/4/2016 Not reviewed this visit You Were Diagnosed With   
  
 Codes Comments Essential hypertension    -  Primary ICD-10-CM: I10 
ICD-9-CM: 401.9 Cough     ICD-10-CM: R05 ICD-9-CM: 020. 2 Dyslipidemia     ICD-10-CM: E78.5 ICD-9-CM: 272.4 Prediabetes     ICD-10-CM: R73.03 
ICD-9-CM: 790.29 Primary osteoarthritis of both knees     ICD-10-CM: M17.0 ICD-9-CM: 715.16 Abnormal finding of blood chemistry     ICD-10-CM: R79.9 ICD-9-CM: 790.6 Skipped heart beats     ICD-10-CM: I45.9 ICD-9-CM: 427.9 Vitals BP Pulse Temp Resp Height(growth percentile) Weight(growth percentile) 121/68 100 98.1 °F (36.7 °C) (Oral) 20 5' 3\" (1.6 m) 130 lb 6.4 oz (59.1 kg) SpO2 BMI OB Status Smoking Status 95% 23.1 kg/m2 Postmenopausal Never Smoker Vitals History BMI and BSA Data Body Mass Index Body Surface Area  
 23.1 kg/m 2 1.62 m 2 Preferred Pharmacy Pharmacy Name Phone [facility] [address] [place] [address] [address] [facility] [address] [phone] Your Updated Medication List  
  
   
This list is accurate as of: 2/2/18  2:05 PM.  Always use your most recent med list.  
  
  
  
  
 Olga Kaila 250-50 mcg/dose diskus inhaler Generic drug:  fluticasone-salmeterol INHALE 1 PUFF BY MOUTH TWICE DAILY  
  
 * albuterol 90 mcg/actuation inhaler Commonly known as:  PROVENTIL HFA, VENTOLIN HFA, PROAIR HFA Take 2 Puffs by inhalation every four (4) hours as needed for Wheezing. * albuterol 2.5 mg /3 mL (0.083 %) nebulizer solution Commonly known as:  PROVENTIL VENTOLIN  
3 mL by Nebulization route every four (4) hours as needed for Wheezing. * albuterol 2.5 mg /3 mL (0.083 %) nebulizer solution Commonly known as:  PROVENTIL VENTOLIN  
3 mL by Nebulization route every four (4) hours as needed for Wheezing. alendronate 70 mg tablet Commonly known as:  FOSAMAX TAKE 1 TABLET BY MOUTH EVERY SUNDAY  
  
 amLODIPine 10 mg tablet Commonly known as:  Sofiya Slate Take 1 Tab by mouth daily. * atorvastatin 40 mg tablet Commonly known as:  LIPITOR Take 1 Tab by mouth daily. * atorvastatin 20 mg tablet Commonly known as:  LIPITOR TK 1 T PO D  
  
 azithromycin 250 mg tablet Commonly known as:  Wilene Pinna Take 1 Tab by mouth See Admin Instructions for 5 days. ezetimibe 10 mg tablet Commonly known as:  Eddy Plank Take 1 Tab by mouth daily. fluticasone 50 mcg/actuation nasal spray Commonly known as:  FLONASE  
USE 2 SPRAYS IN BOTH NOSTRILS DAILY  
  
 hydrALAZINE 25 mg tablet Commonly known as:  APRESOLINE  
TAKE 1 TABLET BY MOUTH TWICE DAILY  
  
 levocetirizine 5 mg tablet Commonly known as:  Tyrone Jet TAKE 1 TABLET BY MOUTH DAILY AS NEEDED FOR ALLERGIES  
  
 levothyroxine 100 mcg tablet Commonly known as:  SYNTHROID Take 1 tab every day Monday to Friday metoprolol succinate 25 mg XL tablet Commonly known as:  TOPROL-XL Take 0.5 Tabs by mouth daily. montelukast 10 mg tablet Commonly known as:  SINGULAIR Take 1 Tab by mouth daily. polyethylene glycol 17 gram/dose powder Commonly known as:  Gabrielle Boris Take 17 g by mouth daily. traMADol 50 mg tablet Commonly known as:  ULTRAM  
TAKE 1 TABLET BY MOUTH EVERY 8 HOURS AS NEEDED  
  
 traZODone 50 mg tablet Commonly known as:  Cannon Rinne * Notice: This list has 5 medication(s) that are the same as other medications prescribed for you. Read the directions carefully, and ask your doctor or other care provider to review them with you. Prescriptions Sent to Pharmacy Refills  
 albuterol (PROVENTIL VENTOLIN) 2.5 mg /3 mL (0.083 %) nebulizer solution 11 Sig: 3 mL by Nebulization route every four (4) hours as needed for Wheezing. Class: Normal  
 Pharmacy: [facility] [address] [place] [address] AT [address] [facility] Ph #: [phone] Route: Nebulization  
 azithromycin (ZITHROMAX) 250 mg tablet 1 Sig: Take 1 Tab by mouth See Admin Instructions for 5 days. Class: Normal  
 Pharmacy: [facility] [address] [place] [address] AT [address] [facility] Ph #: [phone] Route: Oral  
  
We Performed the Following AMB POC EKG ROUTINE W/ 12 LEADS, INTER & REP [51256 CPT(R)] CBC WITH AUTOMATED DIFF [26642 CPT(R)] COLLECTION VENOUS BLOOD,VENIPUNCTURE [id number] CPT(R)] HEMOGLOBIN A1C WITH EAG [60132 CPT(R)] LIPID PANEL [63228 CPT(R)] METABOLIC PANEL, COMPREHENSIVE [23680 CPT(R)] TSH 3RD GENERATION [81510 CPT(R)] URINALYSIS W/ RFLX MICROSCOPIC [23230 CPT(R)] Follow-up Instructions Return in about 4 months (around 6/2/2018). To-Do List   
 02/02/2018 ECHO:  2D ECHO COMPLETE ADULT (TTE) W OR WO CONTR   
  
 02/02/2018 ECG: CARDIAC HOLTER MONITOR, 24 HOURS   
  
 02/02/2018 Imaging:  DESMOND MAMMO BI SCREENING INCL CAD Introducing [facility]! [address] introduces Wellpepper patient portal. Now you can access parts of your medical record, email your doctor's office, and request medication refills online. 1. In your internet browser, go to [email or web address] 2. Click on the First Time User? Click Here link in the Sign In box. You will see the New Member Sign Up page. 3. Enter your Wellpepper Access Code exactly as it appears below. You will not need to use this code after youve completed the sign-up process. If you do not sign up before the expiration date, you must request a new code. · Wellpepper Access Code: [email or web address] Expires: 2/15/2018  1:36 PM 
 
4. Enter the last four digits of your Social Security Number (xxxx) and Date of Birth (mm/dd/yyyy) as indicated and click Submit. You will be taken to the next sign-up page. 5. Create a Wellpepper ID. This will be your Wellpepper login ID and cannot be changed, so think of one that is secure and easy to remember. 6. Create a Wellpepper password. You can change your password at any time. 7. Enter your Password Reset Question and Answer. This can be used at a later time if you forget your password. 8. Enter your e-mail address. You will receive e-mail notification when new information is available in [address] 9. Click Sign Up. You can now view and download portions of your medical record. 10. Click the Download Summary menu link to download a portable copy of your medical information. If you have questions, please visit the Frequently Asked Questions section of the Wellpepper website. Remember, Wellpepper is NOT to be used for urgent needs. For medical emergencies, dial 911. Now available from your iPhone and Android! Please provide this summary of care documentation to your next provider. Your primary care clinician is listed as Viktoriya Hidalgo. If you have any questions after today's visit, please call [phone].

## 2018-02-02 NOTE — PROGRESS NOTES
SPORTS MEDICINE AND PRIMARY CARE  Ayesha Bermeo MD, [address] [address]  Phone:  [phone]  Fax: [phone]       Chief Complaint   Patient presents with    Hypertension     f/u   . SUBJECTIVE:    Jenny Zhong is a 80 y.o. female Patient returns today with known history of bronchial asthma, dyslipidemia, thyroid disorder and primary hypertension. She complains of a cough that just won't go away, is productive of mucoid to mucopurulent sputum. She's also out of her nebulizer solution. Patient is seen for evaluation. Current Outpatient Prescriptions   Medication Sig Dispense Refill    albuterol (PROVENTIL VENTOLIN) 2.5 mg /3 mL (0.083 %) nebulizer solution 3 mL by Nebulization route every four (4) hours as needed for Wheezing. 100 Each 11    azithromycin (ZITHROMAX) 250 mg tablet Take 1 Tab by mouth See Admin Instructions for 5 days. 6 Tab 1    traMADol (ULTRAM) 50 mg tablet TAKE 1 TABLET BY MOUTH EVERY 8 HOURS AS NEEDED 90 Tab 2    ADVAIR DISKUS 250-50 mcg/dose diskus inhaler INHALE 1 PUFF BY MOUTH TWICE DAILY 60 Inhaler 11    ezetimibe (ZETIA) 10 mg tablet Take 1 Tab by mouth daily. 30 Tab 11    amLODIPine (NORVASC) 10 mg tablet Take 1 Tab by mouth daily. 30 Tab 11    atorvastatin (LIPITOR) 20 mg tablet TK 1 T PO D  8    polyethylene glycol (MIRALAX) 17 gram/dose powder Take 17 g by mouth daily. 527 g 11    metoprolol succinate (TOPROL-XL) 25 mg XL tablet Take 0.5 Tabs by mouth daily. 30 Tab 2    levocetirizine (XYZAL) 5 mg tablet TAKE 1 TABLET BY MOUTH DAILY AS NEEDED FOR ALLERGIES 30 Tab 11    atorvastatin (LIPITOR) 40 mg tablet Take 1 Tab by mouth daily. 30 Tab 11    levothyroxine (SYNTHROID) 100 mcg tablet Take 1 tab every day Monday to Friday 30 Tab 11    alendronate (FOSAMAX) 70 mg tablet TAKE 1 TABLET BY MOUTH EVERY SUNDAY 4 Tab 11    montelukast (SINGULAIR) 10 mg tablet Take 1 Tab by mouth daily.  30 Tab 11    fluticasone (FLONASE) 50 mcg/actuation nasal spray USE 2 SPRAYS IN BOTH NOSTRILS DAILY 48 Bottle 11    hydrALAZINE (APRESOLINE) 25 mg tablet TAKE 1 TABLET BY MOUTH TWICE DAILY 30 Tab 11    albuterol (PROVENTIL VENTOLIN) 2.5 mg /3 mL (0.083 %) nebulizer solution 3 mL by Nebulization route every four (4) hours as needed for Wheezing. 150 Each 11    traZODone (DESYREL) 50 mg tablet       albuterol (PROVENTIL HFA, VENTOLIN HFA, PROAIR HFA) 90 mcg/actuation inhaler Take 2 Puffs by inhalation every four (4) hours as needed for Wheezing.  3 Inhaler 11     Past Medical History:   Diagnosis Date    Asthma     Bereavement 3/30/16    63 yo con  - multiple myleoma     Cholelithiasis     Cough     Dyslipidemia     Hip pain, chronic, left 2017    Hypertension     Low back pain 16    Osteoarthritis     Osteoporosis 16    Prediabetes     S/P colonoscopy 7/15    vcu    Trigger finger, right 2017    Wedge compression fx of second thoracic vertebra with routine healing 16    t11     Past Surgical History:   Procedure Laterality Date    HX COLONOSCOPY      HX GYN       Allergies   Allergen Reactions    Crestor [Rosuvastatin] Swelling         REVIEW OF SYSTEMS:  General: negative for - chills or fever  ENT: negative for - headaches, nasal congestion or tinnitus  Respiratory: negative for - cough, hemoptysis, shortness of breath or wheezing  Cardiovascular : negative for - chest pain, edema, palpitations or shortness of breath  Gastrointestinal: negative for - abdominal pain, blood in stools, heartburn or nausea/vomiting  Genito-Urinary: no dysuria, trouble voiding, or hematuria  Musculoskeletal: negative for - gait disturbance, joint pain, joint stiffness or joint swelling  Neurological: no TIA or stroke symptoms  Hematologic: no bruises, no bleeding, no swollen glands  Integument: no lumps, mole changes, nail changes or rash  Endocrine: no malaise/lethargy or unexpected weight changes      Social History Social History    Marital status:      Spouse name: N/A    Number of children: N/A    Years of education: N/A     Social History Main Topics    Smoking status: Never Smoker    Smokeless tobacco: Never Used    Alcohol use No    Drug use: No    Sexual activity: Not Currently     Other Topics Concern    None     Social History Narrative    .         Habits:  No smoking, E2H or drug abuse.          Social History:  The patient is . The patient lives with her daughter and her  and granddaughter. 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 The youngest is  to 58. She has twelve grandchildren and 5 great grandchildren.          Family History:  Father  at 80, natural causes. Mother  at 80 with complications of hypertension. She has two brothers and one sister, living. daughter  of cervical cancer. Patient is a Yarsani and prefers not to take blood or blood products. Family History   Problem Relation Age of Onset    Family history unknown: Yes    Cancer Son        OBJECTIVE:    Visit Vitals    /68    Pulse 100    Temp 98.1 °F (36.7 °C) (Oral)    Resp 20    Ht 5' 3\" (1.6 m)    Wt 130 lb 6.4 oz (59.1 kg)    SpO2 95%    BMI 23.1 kg/m2     CONSTITUTIONAL: well , well nourished, appears age appropriate  EYES: perrla, eom intact  ENMT:moist mucous membranes, pharynx clear  NECK: supple. Thyroid normal  RESPIRATORY: Chest: clear bilaterally   CARDIOVASCULAR: Heart: regular rate and rhythm  GASTROINTESTINAL: Abdomen: soft, bowel sounds active  HEMATOLOGIC: no pathological lymph nodes palpated  MUSCULOSKELETAL: Extremities: no edema, pulse 1+   INTEGUMENT: No unusual rashes or suspicious skin lesions noted. Nails appear normal.  NEUROLOGIC: non-focal exam   MENTAL STATUS: alert and oriented, appropriate affect           ASSESSMENT:  1. Essential hypertension    2. Cough    3. Dyslipidemia    4. Prediabetes    5. Primary osteoarthritis of both knees    6. Abnormal finding of blood chemistry     7. Skipped heart beats      Patient also mentioned that she feels her heart skip a beat periodically. It doesn't occur on a daily basis, but she states \"I don't want to have a heart attack\". We will renew her medications for bronchospasm, which may be contributing to the cough. We also suggest she use Robitussin DM for the cough. Her blood pressure control is at goal.    As usual, she remains at her ideal body weight. She'll return to the office in four to six months or as needed. PLAN:  .  Orders Placed This Encounter    DESMOND MAMMO BI SCREENING INCL CAD    URINALYSIS W/ RFLX MICROSCOPIC    CBC WITH AUTOMATED DIFF    METABOLIC PANEL, COMPREHENSIVE    LIPID PANEL    TSH 3RD GENERATION    HEMOGLOBIN A1C WITH EAG    AMB POC EKG ROUTINE W/ 12 LEADS, INTER & REP    CARDIAC HOLTER MONITOR, 24 HOURS    2D ECHO COMPLETE ADULT (TTE) W OR WO CONTR    albuterol (PROVENTIL VENTOLIN) 2.5 mg /3 mL (0.083 %) nebulizer solution    azithromycin (ZITHROMAX) 250 mg tablet       Follow-up Disposition:  Return in about 4 months (around 6/2/2018). ATTENTION:   This medical record was transcribed using an electronic medical records system. Although proofread, it may and can contain electronic and spelling errors. Other human spelling and other errors may be present. Corrections may be executed at a later time. Please feel free to contact us for any clarifications as needed.

## 2018-02-02 NOTE — PROGRESS NOTES
1. Have you been to the ER, urgent care clinic since your last visit? Hospitalized since your last visit? No    2. Have you seen or consulted any other health care providers outside of the [address] [place] [address] since your last visit? Include any pap smears or colon screening.  No      Cold symptoms

## 2018-02-03 LAB
ALBUMIN SERPL-MCNC: 4.4 G/DL (ref 3.5–4.7)
ALBUMIN/GLOB SERPL: 1.4 {RATIO} (ref 1.2–2.2)
ALP SERPL-CCNC: 91 IU/L (ref 39–117)
ALT SERPL-CCNC: 13 IU/L (ref 0–32)
APPEARANCE UR: CLEAR
AST SERPL-CCNC: 21 IU/L (ref 0–40)
BACTERIA #/AREA URNS HPF: NORMAL /[HPF]
BASOPHILS # BLD AUTO: 0 X10E3/UL (ref 0–0.2)
BASOPHILS NFR BLD AUTO: 0 %
BILIRUB SERPL-MCNC: 0.7 MG/DL (ref 0–1.2)
BILIRUB UR QL STRIP: NEGATIVE
BUN SERPL-MCNC: 16 MG/DL (ref 8–27)
BUN/CREAT SERPL: 14 (ref 12–28)
CALCIUM SERPL-MCNC: 9.5 MG/DL (ref 8.7–10.3)
CASTS URNS QL MICRO: NORMAL /LPF
CHLORIDE SERPL-SCNC: 100 MMOL/L (ref 96–106)
CHOLEST SERPL-MCNC: 145 MG/DL (ref 100–199)
CO2 SERPL-SCNC: 22 MMOL/L (ref 18–29)
COLOR UR: YELLOW
CREAT SERPL-MCNC: 1.13 MG/DL (ref 0.57–1)
EOSINOPHIL # BLD AUTO: 1.1 X10E3/UL (ref 0–0.4)
EOSINOPHIL NFR BLD AUTO: 12 %
EPI CELLS #/AREA URNS HPF: NORMAL /HPF
ERYTHROCYTE [DISTWIDTH] IN BLOOD BY AUTOMATED COUNT: 14.8 % (ref 12.3–15.4)
EST. AVERAGE GLUCOSE BLD GHB EST-MCNC: 105 MG/DL
GFR SERPLBLD CREATININE-BSD FMLA CKD-EPI: 45 ML/MIN/1.73
GFR SERPLBLD CREATININE-BSD FMLA CKD-EPI: 52 ML/MIN/1.73
GLOBULIN SER CALC-MCNC: 3.2 G/DL (ref 1.5–4.5)
GLUCOSE SERPL-MCNC: 105 MG/DL (ref 65–99)
GLUCOSE UR QL: NEGATIVE
HBA1C MFR BLD: 5.3 % (ref 4.8–5.6)
HCT VFR BLD AUTO: 32.2 % (ref 34–46.6)
HDLC SERPL-MCNC: 52 MG/DL
HGB BLD-MCNC: 10.8 G/DL (ref 11.1–15.9)
HGB UR QL STRIP: NEGATIVE
IMM GRANULOCYTES # BLD: 0 X10E3/UL (ref 0–0.1)
IMM GRANULOCYTES NFR BLD: 0 %
KETONES UR QL STRIP: ABNORMAL
LDLC SERPL CALC-MCNC: 71 MG/DL (ref 0–99)
LEUKOCYTE ESTERASE UR QL STRIP: ABNORMAL
LYMPHOCYTES # BLD AUTO: 2.2 X10E3/UL (ref 0.7–3.1)
LYMPHOCYTES NFR BLD AUTO: 25 %
MCH RBC QN AUTO: 27 PG (ref 26.6–33)
MCHC RBC AUTO-ENTMCNC: 33.5 G/DL (ref 31.5–35.7)
MCV RBC AUTO: 81 FL (ref 79–97)
MICRO URNS: ABNORMAL
MONOCYTES # BLD AUTO: 0.6 X10E3/UL (ref 0.1–0.9)
MONOCYTES NFR BLD AUTO: 7 %
NEUTROPHILS # BLD AUTO: 4.9 X10E3/UL (ref 1.4–7)
NEUTROPHILS NFR BLD AUTO: 56 %
NITRITE UR QL STRIP: NEGATIVE
PH UR STRIP: 6 [PH] (ref 5–7.5)
PLATELET # BLD AUTO: 324 X10E3/UL (ref 150–379)
POTASSIUM SERPL-SCNC: 4.9 MMOL/L (ref 3.5–5.2)
PROT SERPL-MCNC: 7.6 G/DL (ref 6–8.5)
PROT UR QL STRIP: NEGATIVE
RBC # BLD AUTO: 4 X10E6/UL (ref 3.77–5.28)
RBC #/AREA URNS HPF: NORMAL /HPF
SODIUM SERPL-SCNC: 140 MMOL/L (ref 134–144)
SP GR UR: 1.02 (ref 1–1.03)
TRIGL SERPL-MCNC: 108 MG/DL (ref 0–149)
TSH SERPL DL<=0.005 MIU/L-ACNC: 0.09 UIU/ML (ref 0.45–4.5)
UROBILINOGEN UR STRIP-MCNC: 1 MG/DL (ref 0.2–1)
VLDLC SERPL CALC-MCNC: 22 MG/DL (ref 5–40)
WBC # BLD AUTO: 8.7 X10E3/UL (ref 3.4–10.8)
WBC #/AREA URNS HPF: NORMAL /HPF

## 2018-03-02 RX ORDER — ATORVASTATIN CALCIUM 20 MG/1
TABLET, FILM COATED ORAL
Qty: 30 TAB | Refills: 11 | Status: SHIPPED | OUTPATIENT
Start: 2018-03-02 | End: 2022-03-07

## 2018-04-05 DIAGNOSIS — M25.552 HIP PAIN, CHRONIC, LEFT: ICD-10-CM

## 2018-04-05 DIAGNOSIS — G89.29 HIP PAIN, CHRONIC, LEFT: ICD-10-CM

## 2018-04-05 RX ORDER — TRAMADOL HYDROCHLORIDE 50 MG/1
TABLET ORAL
Qty: 90 TAB | Refills: 2 | Status: SHIPPED | OUTPATIENT
Start: 2018-04-05 | End: 2022-03-07

## 2018-04-18 ENCOUNTER — TELEPHONE (OUTPATIENT)
Dept: INTERNAL MEDICINE CLINIC | Age: 83
End: 2018-04-18

## 2018-09-06 ENCOUNTER — OP HISTORICAL/CONVERTED ENCOUNTER (OUTPATIENT)
Dept: OTHER | Age: 83
End: 2018-09-06

## 2019-01-20 ENCOUNTER — IP HISTORICAL/CONVERTED ENCOUNTER (OUTPATIENT)
Dept: OTHER | Age: 84
End: 2019-01-20

## 2019-02-08 ENCOUNTER — OP HISTORICAL/CONVERTED ENCOUNTER (OUTPATIENT)
Dept: OTHER | Age: 84
End: 2019-02-08

## 2019-02-15 ENCOUNTER — OP HISTORICAL/CONVERTED ENCOUNTER (OUTPATIENT)
Dept: OTHER | Age: 84
End: 2019-02-15

## 2019-05-29 ENCOUNTER — OP HISTORICAL/CONVERTED ENCOUNTER (OUTPATIENT)
Dept: OTHER | Age: 84
End: 2019-05-29

## 2019-07-15 ENCOUNTER — ED HISTORICAL/CONVERTED ENCOUNTER (OUTPATIENT)
Dept: OTHER | Age: 84
End: 2019-07-15

## 2019-12-19 ENCOUNTER — OP HISTORICAL/CONVERTED ENCOUNTER (OUTPATIENT)
Dept: OTHER | Age: 84
End: 2019-12-19

## 2019-12-23 ENCOUNTER — ED HISTORICAL/CONVERTED ENCOUNTER (OUTPATIENT)
Dept: OTHER | Age: 84
End: 2019-12-23

## 2020-06-27 ENCOUNTER — OP HISTORICAL/CONVERTED ENCOUNTER (OUTPATIENT)
Dept: OTHER | Age: 85
End: 2020-06-27

## 2020-07-11 ENCOUNTER — IP HISTORICAL/CONVERTED ENCOUNTER (OUTPATIENT)
Dept: OTHER | Age: 85
End: 2020-07-11

## 2020-11-24 ENCOUNTER — APPOINTMENT (OUTPATIENT)
Dept: GENERAL RADIOLOGY | Age: 85
End: 2020-11-24
Attending: EMERGENCY MEDICINE
Payer: COMMERCIAL

## 2020-11-24 ENCOUNTER — HOSPITAL ENCOUNTER (EMERGENCY)
Age: 85
Discharge: HOME | End: 2020-11-24
Attending: EMERGENCY MEDICINE
Payer: COMMERCIAL

## 2020-11-24 VITALS
OXYGEN SATURATION: 100 % | HEIGHT: 63 IN | WEIGHT: 120 LBS | DIASTOLIC BLOOD PRESSURE: 81 MMHG | RESPIRATION RATE: 18 BRPM | SYSTOLIC BLOOD PRESSURE: 155 MMHG | TEMPERATURE: 98.8 F | BODY MASS INDEX: 21.26 KG/M2

## 2020-11-24 DIAGNOSIS — M17.11 OSTEOARTHRITIS OF RIGHT KNEE, UNSPECIFIED OSTEOARTHRITIS TYPE: ICD-10-CM

## 2020-11-24 DIAGNOSIS — M25.561 ACUTE PAIN OF RIGHT KNEE: Primary | ICD-10-CM

## 2020-11-24 PROCEDURE — 73564 X-RAY EXAM KNEE 4 OR MORE: CPT

## 2020-11-24 PROCEDURE — 99282 EMERGENCY DEPT VISIT SF MDM: CPT

## 2020-11-24 RX ORDER — NAPROXEN 500 MG/1
500 TABLET ORAL 2 TIMES DAILY WITH MEALS
Qty: 20 TAB | Refills: 0 | Status: SHIPPED | OUTPATIENT
Start: 2020-11-24 | End: 2022-03-07

## 2020-11-24 RX ORDER — DEXAMETHASONE SODIUM PHOSPHATE 4 MG/ML
10 INJECTION, SOLUTION INTRA-ARTICULAR; INTRALESIONAL; INTRAMUSCULAR; INTRAVENOUS; SOFT TISSUE ONCE
Status: DISCONTINUED | OUTPATIENT
Start: 2020-11-24 | End: 2020-11-25 | Stop reason: HOSPADM

## 2020-11-24 RX ORDER — NAPROXEN 500 MG/1
500 TABLET ORAL 2 TIMES DAILY WITH MEALS
Qty: 20 TAB | Refills: 0 | Status: SHIPPED | OUTPATIENT
Start: 2020-11-24 | End: 2020-11-24 | Stop reason: SDUPTHER

## 2020-11-24 NOTE — ED PROVIDER NOTES
EMERGENCY DEPARTMENT HISTORY AND PHYSICAL EXAM      Date: 11/24/2020  Patient Name: Simran Holly    History of Presenting Illness     Chief Complaint   Patient presents with    Knee Pain       History Provided By: Patient    HPI: Simran Holly, 80 y.o. female presents to the ED with cc of   Chief Complaint   Patient presents with    Knee Pain   Pt presents with Right Knee pain for 3 days, worsening last night, keeping her awake. Patient denies any fever or any trauma, history of osteoarthritis   Moderate severity, no known exacerbating or relieving factors, no other associated signs and symptoms. There are no other complaints, changes, or physical findings at this time. PCP: Aly Echavarria MD    No current facility-administered medications on file prior to encounter. Current Outpatient Medications on File Prior to Encounter   Medication Sig Dispense Refill    montelukast (SINGULAIR) 10 mg tablet TAKE 1 TABLET BY MOUTH DAILY 30 Tab 11    traMADol (ULTRAM) 50 mg tablet TAKE 1 TABLET BY MOUTH EVERY 8 HOURS AS NEEDED 90 Tab 2    metoprolol succinate (TOPROL-XL) 25 mg XL tablet TAKE 1/2 TABLET BY MOUTH DAILY 90 Tab 11    atorvastatin (LIPITOR) 20 mg tablet Take 1 tablet daily 30 Tab 11    albuterol (PROVENTIL VENTOLIN) 2.5 mg /3 mL (0.083 %) nebulizer solution 3 mL by Nebulization route every four (4) hours as needed for Wheezing. 100 Each 11    ADVAIR DISKUS 250-50 mcg/dose diskus inhaler INHALE 1 PUFF BY MOUTH TWICE DAILY 60 Inhaler 11    ezetimibe (ZETIA) 10 mg tablet Take 1 Tab by mouth daily. 30 Tab 11    amLODIPine (NORVASC) 10 mg tablet Take 1 Tab by mouth daily. 30 Tab 11    polyethylene glycol (MIRALAX) 17 gram/dose powder Take 17 g by mouth daily.  527 g 11    levocetirizine (XYZAL) 5 mg tablet TAKE 1 TABLET BY MOUTH DAILY AS NEEDED FOR ALLERGIES 30 Tab 11    levothyroxine (SYNTHROID) 100 mcg tablet Take 1 tab every day Monday to Friday 30 Tab 11    alendronate (FOSAMAX) 70 mg tablet TAKE 1 TABLET BY MOUTH EVERY  4 Tab 11    fluticasone (FLONASE) 50 mcg/actuation nasal spray USE 2 SPRAYS IN BOTH NOSTRILS DAILY 48 Bottle 11    hydrALAZINE (APRESOLINE) 25 mg tablet TAKE 1 TABLET BY MOUTH TWICE DAILY 30 Tab 11    traZODone (DESYREL) 50 mg tablet       albuterol (PROVENTIL HFA, VENTOLIN HFA, PROAIR HFA) 90 mcg/actuation inhaler Take 2 Puffs by inhalation every four (4) hours as needed for Wheezing. 3 Inhaler 11       Past History     Past Medical History:  Past Medical History:   Diagnosis Date    Asthma     Bereavement 3/30/16    65 yo con  - multiple myleoma     Cholelithiasis     Cough     Dyslipidemia     Hip pain, chronic, left 2017    Hypertension     Low back pain 16    Osteoarthritis     Osteoporosis 16    Prediabetes     S/P colonoscopy 7/15    [facility]    Trigger finger, right 2017    Wedge compression fx of second thoracic vertebra with routine healing 16    t11       Past Surgical History:  Past Surgical History:   Procedure Laterality Date    HX COLONOSCOPY      HX GYN         Family History:  Family History   Family history unknown: Yes   Problem Relation Age of Onset    Family history unknown: Yes    Cancer Son        Social History:  Social History     Tobacco Use    Smoking status: Never Smoker    Smokeless tobacco: Never Used   Substance Use Topics    Alcohol use: No    Drug use: No       Allergies: Allergies   Allergen Reactions    Crestor [Rosuvastatin] Swelling         Review of Systems   Review of Systems   Constitutional: Negative. Negative for chills and fever. HENT: Negative for congestion, facial swelling, rhinorrhea and sore throat. Eyes: Negative. Negative for photophobia and pain. Respiratory: Negative for cough, shortness of breath and wheezing. Cardiovascular: Negative. Negative for chest pain. Gastrointestinal: Negative for abdominal distention and abdominal pain.    Genitourinary: Negative. Musculoskeletal: Positive for joint swelling and myalgias. Negative for neck pain. Allergic/Immunologic: Negative for immunocompromised state. Neurological: Negative. Negative for syncope and weakness. Hematological: Negative. Psychiatric/Behavioral: Negative. Physical Exam   Physical Exam  Vitals signs and nursing note reviewed. Constitutional:       Appearance: Normal appearance. HENT:      Head: Normocephalic and atraumatic. Mouth/Throat:      Pharynx: Oropharynx is clear. Eyes:      Extraocular Movements: Extraocular movements intact. Pupils: Pupils are equal, round, and reactive to light. Neck:      Musculoskeletal: Normal range of motion and neck supple. Cardiovascular:      Rate and Rhythm: Normal rate and regular rhythm. Pulses: Normal pulses. Heart sounds: Normal heart sounds. Comments: Chest wall tenderness mild  Pulmonary:      Breath sounds: Normal breath sounds. Abdominal:      General: Abdomen is flat. Bowel sounds are normal.      Palpations: Abdomen is soft. Musculoskeletal:      Right knee: She exhibits decreased range of motion, swelling, effusion and bony tenderness. She exhibits no ecchymosis, no deformity, no laceration and no erythema. Tenderness found. Patellar tendon tenderness noted. Skin:     General: Skin is warm and dry. Neurological:      General: No focal deficit present. Mental Status: She is alert and oriented to person, place, and time. Psychiatric:         Mood and Affect: Mood is anxious. Behavior: Behavior normal.         Diagnostic Study Results     Labs -   No results found for this or any previous visit (from the past 12 hour(s)). Labs reviewed by me    Radiologic Studies -   XR KNEE RT MIN 4 V   Final Result        CT Results  (Last 48 hours)    None        CXR Results  (Last 48 hours)    None            Medical Decision Making     I am the first provider for this patient.     I reviewed the vital signs, available nursing notes, past medical history, past surgical history, family history and social history. RADIOLOGY report and LABS reviewed by me    Vital Signs-Reviewed the patient's vital signs. Patient Vitals for the past 12 hrs:   Temp Resp BP SpO2   11/24/20 1357 98.8 °F (37.1 °C) 18 (!) 155/81 100 %       EKG interpretation: (Preliminary)      Records Reviewed: Nurse's note. Provider Notes (Medical Decision Making):    Patient presents with DIFF DX : Arthritis, gout, fracture        ED Course:   Initial assessment performed. The patients presenting problems have been discussed, and they are in agreement with the care plan formulated and outlined with them. I have encouraged them to ask questions as they arise throughout their visit. TREATMENT RESPONSE -Stable          Isaiah Cerrato MD      Disposition:  Discharged   Diagnostic tests were reviewed and questions answered. Diagnosis, care plan and treatment options were discussed. The patient understand instructions and will follow up as directed. Condition stable    Admitting Provider:  No admitting provider for patient encounter. Consulting Provider:  No ref. provider found       DISCHARGE PLAN:  1. Current Discharge Medication List      START taking these medications    Details   naproxen (NAPROSYN) 500 mg tablet Take 1 Tab by mouth two (2) times daily (with meals). Qty: 20 Tab, Refills: 0           2. Follow-up Information     Follow up With Specialties Details Why Contact Info    Shweta Saxena MD Internal Medicine   [address] [address]  [address] [facility], [address] [place] [address] Orthopedic Surgery In 3 days  [address]  [phone]          3. Return to ED if worse     Diagnosis     Clinical Impression:     ICD-10-CM ICD-9-CM    1. Acute pain of right knee  M25.561 719.46    2.  Osteoarthritis of right knee, unspecified osteoarthritis type  M17.11 715.96         Attestations:    Caren Dejesus MD    Please note that this dictation was completed with [employer], the computer voice recognition software. Quite often unanticipated grammatical, syntax, homophones, and other interpretive errors are inadvertently transcribed by the computer software. Please disregard these errors. Please excuse any errors that have escaped final proofreading. Thank you.

## 2021-09-11 ENCOUNTER — APPOINTMENT (OUTPATIENT)
Dept: GENERAL RADIOLOGY | Age: 86
End: 2021-09-11
Attending: STUDENT IN AN ORGANIZED HEALTH CARE EDUCATION/TRAINING PROGRAM
Payer: MEDICARE

## 2021-09-11 ENCOUNTER — HOSPITAL ENCOUNTER (EMERGENCY)
Age: 86
Discharge: HOME | End: 2021-09-11
Payer: MEDICARE

## 2021-09-11 VITALS
OXYGEN SATURATION: 97 % | WEIGHT: 150 LBS | TEMPERATURE: 98.4 F | SYSTOLIC BLOOD PRESSURE: 107 MMHG | HEART RATE: 98 BPM | HEIGHT: 63 IN | RESPIRATION RATE: 18 BRPM | BODY MASS INDEX: 26.58 KG/M2 | DIASTOLIC BLOOD PRESSURE: 72 MMHG

## 2021-09-11 DIAGNOSIS — M25.461 KNEE EFFUSION, RIGHT: Primary | ICD-10-CM

## 2021-09-11 DIAGNOSIS — M17.11 PRIMARY OSTEOARTHRITIS OF RIGHT KNEE: ICD-10-CM

## 2021-09-11 LAB
APPEARANCE SNV: ABNORMAL
BODY FLD TYPE: NORMAL
BODY FLD TYPE: NORMAL
COLOR SNV: ABNORMAL
CRYSTALS FLD MICRO: NORMAL
PH FLD: 7 [PH]
RBC # SNV: >100 /CU MM
WBC # SNV: ABNORMAL /CU MM (ref 0–150)

## 2021-09-11 PROCEDURE — 89060 EXAM SYNOVIAL FLUID CRYSTALS: CPT

## 2021-09-11 PROCEDURE — 74011250637 HC RX REV CODE- 250/637: Performed by: PHYSICIAN ASSISTANT

## 2021-09-11 PROCEDURE — 75810000054 HC ARTHOCENTISIS JOINT

## 2021-09-11 PROCEDURE — 83986 ASSAY PH BODY FLUID NOS: CPT

## 2021-09-11 PROCEDURE — 73562 X-RAY EXAM OF KNEE 3: CPT

## 2021-09-11 PROCEDURE — 87205 SMEAR GRAM STAIN: CPT

## 2021-09-11 PROCEDURE — 89050 BODY FLUID CELL COUNT: CPT

## 2021-09-11 PROCEDURE — 99283 EMERGENCY DEPT VISIT LOW MDM: CPT

## 2021-09-11 PROCEDURE — 74011000250 HC RX REV CODE- 250: Performed by: PHYSICIAN ASSISTANT

## 2021-09-11 RX ORDER — IBUPROFEN 600 MG/1
600 TABLET ORAL
Status: COMPLETED | OUTPATIENT
Start: 2021-09-11 | End: 2021-09-11

## 2021-09-11 RX ORDER — LIDOCAINE HYDROCHLORIDE 10 MG/ML
10 INJECTION INFILTRATION; PERINEURAL ONCE
Status: COMPLETED | OUTPATIENT
Start: 2021-09-11 | End: 2021-09-11

## 2021-09-11 RX ADMIN — IBUPROFEN 600 MG: 600 TABLET, FILM COATED ORAL at 14:07

## 2021-09-11 RX ADMIN — LIDOCAINE HYDROCHLORIDE 10 ML: 10 INJECTION, SOLUTION INFILTRATION; PERINEURAL at 17:32

## 2021-09-11 NOTE — ED NOTES
EMERGENCY DEPARTMENT HISTORY AND PHYSICAL EXAM      Date: 9/11/2021  Patient Name: Jorge Alberto Keller    History of Presenting Illness     Chief Complaint   Patient presents with    Knee Pain       History Provided By: Patient    HPI: Jorge Alberto Keller, 80 y.o. female with a past medical history significant hypertension and asthma presents to the ED with cc of gradual onset \"dull/achy\" right knee pain swelling pain x 3 days. She denies any precipitating injury or trauma as well as prior similar episodes. Pt also denies any hx of gout. Pt reports treating her pain with Tylenol with minimal relief. She has no further concerns and specifically denies chest pain, shortness of breath, fevers, chills, calf pain, leg swelling, difficulty walking, cough, congestion, abdominal pain, N/V/D, headaches, diaphoresis, or rash. There are no other complaints, changes, or physical findings at this time. PCP: Gi Reyes MD    No current facility-administered medications on file prior to encounter. Current Outpatient Medications on File Prior to Encounter   Medication Sig Dispense Refill    naproxen (NAPROSYN) 500 mg tablet Take 1 Tab by mouth two (2) times daily (with meals). 20 Tab 0    montelukast (SINGULAIR) 10 mg tablet TAKE 1 TABLET BY MOUTH DAILY 30 Tab 11    traMADol (ULTRAM) 50 mg tablet TAKE 1 TABLET BY MOUTH EVERY 8 HOURS AS NEEDED 90 Tab 2    metoprolol succinate (TOPROL-XL) 25 mg XL tablet TAKE 1/2 TABLET BY MOUTH DAILY 90 Tab 11    atorvastatin (LIPITOR) 20 mg tablet Take 1 tablet daily 30 Tab 11    albuterol (PROVENTIL VENTOLIN) 2.5 mg /3 mL (0.083 %) nebulizer solution 3 mL by Nebulization route every four (4) hours as needed for Wheezing. 100 Each 11    ADVAIR DISKUS 250-50 mcg/dose diskus inhaler INHALE 1 PUFF BY MOUTH TWICE DAILY 60 Inhaler 11    ezetimibe (ZETIA) 10 mg tablet Take 1 Tab by mouth daily. 30 Tab 11    amLODIPine (NORVASC) 10 mg tablet Take 1 Tab by mouth daily.  30 Tab 11    polyethylene glycol (MIRALAX) 17 gram/dose powder Take 17 g by mouth daily. 527 g 11    levocetirizine (XYZAL) 5 mg tablet TAKE 1 TABLET BY MOUTH DAILY AS NEEDED FOR ALLERGIES 30 Tab 11    levothyroxine (SYNTHROID) 100 mcg tablet Take 1 tab every day Monday to Friday 30 Tab 11    alendronate (FOSAMAX) 70 mg tablet TAKE 1 TABLET BY MOUTH EVERY  4 Tab 11    fluticasone (FLONASE) 50 mcg/actuation nasal spray USE 2 SPRAYS IN BOTH NOSTRILS DAILY 48 Bottle 11    hydrALAZINE (APRESOLINE) 25 mg tablet TAKE 1 TABLET BY MOUTH TWICE DAILY 30 Tab 11    traZODone (DESYREL) 50 mg tablet       albuterol (PROVENTIL HFA, VENTOLIN HFA, PROAIR HFA) 90 mcg/actuation inhaler Take 2 Puffs by inhalation every four (4) hours as needed for Wheezing. 3 Inhaler 11       Past History     Past Medical History:  Past Medical History:   Diagnosis Date    Asthma     Bereavement 3/30/16    65 yo con  - multiple myleoma     Cholelithiasis     Cough     Dyslipidemia     Hip pain, chronic, left 2017    Hypertension     Low back pain 16    Osteoarthritis     Osteoporosis 16    Prediabetes     S/P colonoscopy 7/15    [facility]    Trigger finger, right 2017    Wedge compression fx of second thoracic vertebra with routine healing 16    t11       Past Surgical History:  Past Surgical History:   Procedure Laterality Date    HX COLONOSCOPY      HX GYN         Family History:  Family History   Family history unknown: Yes   Problem Relation Age of Onset    Family history unknown: Yes    Cancer Son        Social History:  Social History     Tobacco Use    Smoking status: Never Smoker    Smokeless tobacco: Never Used   Vaping Use    Vaping Use: Never used   Substance Use Topics    Alcohol use: Not Currently    Drug use: Never       Allergies: Allergies   Allergen Reactions    Crestor [Rosuvastatin] Swelling         Review of Systems     Review of Systems   Constitutional: Negative.   Negative for chills, diaphoresis and fever. HENT: Negative. Negative for congestion, rhinorrhea and sore throat. Eyes: Negative. Respiratory: Negative. Negative for cough and shortness of breath. Cardiovascular: Negative. Negative for chest pain. Gastrointestinal: Negative. Negative for abdominal pain, diarrhea, nausea and vomiting. Genitourinary: Negative. Negative for difficulty urinating, dysuria and frequency. Musculoskeletal: Positive for arthralgias, joint swelling and myalgias. Skin: Negative. Negative for rash. Neurological: Negative. Negative for dizziness, weakness, numbness and headaches. Psychiatric/Behavioral: Negative. All other systems reviewed and are negative. Physical Exam     Physical Exam  Vitals and nursing note reviewed. Constitutional:       General: She is not in acute distress. Appearance: Normal appearance. She is not ill-appearing or toxic-appearing. HENT:      Head: Normocephalic and atraumatic. Mouth/Throat:      Mouth: Mucous membranes are moist.      Pharynx: Oropharynx is clear. Eyes:      Extraocular Movements: Extraocular movements intact. Conjunctiva/sclera: Conjunctivae normal.      Pupils: Pupils are equal, round, and reactive to light. Pulmonary:      Effort: Pulmonary effort is normal.      Breath sounds: Normal breath sounds. No wheezing, rhonchi or rales. Abdominal:      General: There is no distension. Palpations: Abdomen is soft. Tenderness: There is no abdominal tenderness. There is no guarding or rebound. Musculoskeletal:      Cervical back: Neck supple. No tenderness. Right knee: Swelling and effusion present. No deformity, ecchymosis, bony tenderness or crepitus. Normal range of motion. Tenderness present over the medial joint line. No LCL laxity, MCL laxity, ACL laxity or PCL laxity. Instability Tests: Anterior drawer test negative. Posterior drawer test negative.  Medial Wilber test negative and lateral Wilber test negative. Skin:     General: Skin is warm and dry. Capillary Refill: Capillary refill takes less than 2 seconds. Findings: No rash. Neurological:      General: No focal deficit present. Mental Status: She is alert and oriented to person, place, and time. Psychiatric:         Mood and Affect: Mood normal.         Behavior: Behavior normal.         Lab and Diagnostic Study Results     Labs -   No results found for this or any previous visit (from the past 12 hour(s)). Radiologic Studies -   @lastxrresult@  CT Results  (Last 48 hours)    None        CXR Results  (Last 48 hours)    None            Medical Decision Making   - I am the first provider for this patient. - I reviewed the vital signs, available nursing notes, past medical history, past surgical history, family history and social history. - Initial assessment performed. The patients presenting problems have been discussed, and they are in agreement with the care plan formulated and outlined with them. I have encouraged them to ask questions as they arise throughout their visit. Vital Signs-Reviewed the patient's vital signs. No data found. Records Reviewed: Nursing Notes and Old Medical Records    The patient presents with right knee pain with DDx of knee effusion, gout, septic arthritis, OA, knee sprain, strain, contusion      ED Course:          Provider Notes (Medical Decision Making):     MDM  Number of Diagnoses or Management Options     Amount and/or Complexity of Data Reviewed  Tests in the radiology section of CPT®: ordered and reviewed  Review and summarize past medical records: yes       This is an 79 y/o AAF who presents with 3 days of right knee pain, suspicious for right knee effusion secondary to gout and OA. Pt has full passive ROM with flexion and extension but active ROM is minimally limited due to pain.  Considered, but doubt, tibial plateau fracture, septic arthritis, other acute unstable fracture, or significant neurovascular compromise. Plan: Obtain XR, pain control, reassessment with consideration of arthrocentesis. Procedures   Medical Decision Makingedical Decision Making  Performed by: Mary Valdez PA-C  PROCEDURES:  Arthrocentesis    Date/Time: 9/11/2021 2:24 PM  Performed by: Belle Johnson PA-C  Authorized by: Belle Johnson PA-C     Consent:     Consent obtained:  Verbal    Consent given by:  Patient    Risks discussed:  Bleeding, infection, pain and incomplete drainage    Alternatives discussed:  No treatment and referral  Location:     Location:  Knee    Knee:  R knee  Anesthesia (see MAR for exact dosages): Anesthesia method:  Local infiltration    Local anesthetic:  Lidocaine 1% w/o epi  Procedure details:     Preparation: Patient was prepped and draped in usual sterile fashion      Needle gauge:  18 G    Ultrasound guidance: no      Approach:  Lateral    Aspirate characteristics:  Serous and blood-tinged    Specimen collected: yes    Post-procedure details:     Dressing:  Adhesive bandage    Patient tolerance of procedure: Tolerated well, no immediate complications  Comments:      Pt endorsed significant relief of pain immediately following procedure and was able to fully actively flex and extend her knee without pain. Disposition   Disposition: DC- Adult Discharges: All of the diagnostic tests were reviewed and questions answered. Diagnosis, care plan and treatment options were discussed. The patient understands the instructions and will follow up as directed. The patients results have been reviewed with them. They have been counseled regarding their diagnosis. The patient verbally convey understanding and agreement of the signs, symptoms, diagnosis, treatment and prognosis and additionally agrees to follow up as recommended with their PCP in 24 - 48 hours.   They also agree with the care-plan and convey that all of their questions have been answered. I have also put together some discharge instructions for them that include: 1) educational information regarding their diagnosis, 2) how to care for their diagnosis at home, as well a 3) list of reasons why they would want to return to the ED prior to their follow-up appointment, should their condition change. Discharged    DISCHARGE PLAN:  1. Current Discharge Medication List      CONTINUE these medications which have NOT CHANGED    Details   naproxen (NAPROSYN) 500 mg tablet Take 1 Tab by mouth two (2) times daily (with meals). Qty: 20 Tab, Refills: 0      montelukast (SINGULAIR) 10 mg tablet TAKE 1 TABLET BY MOUTH DAILY  Qty: 30 Tab, Refills: 11      traMADol (ULTRAM) 50 mg tablet TAKE 1 TABLET BY MOUTH EVERY 8 HOURS AS NEEDED  Qty: 90 Tab, Refills: 2    Associated Diagnoses: Hip pain, chronic, left      metoprolol succinate (TOPROL-XL) 25 mg XL tablet TAKE 1/2 TABLET BY MOUTH DAILY  Qty: 90 Tab, Refills: 11      atorvastatin (LIPITOR) 20 mg tablet Take 1 tablet daily  Qty: 30 Tab, Refills: 11      albuterol (PROVENTIL VENTOLIN) 2.5 mg /3 mL (0.083 %) nebulizer solution 3 mL by Nebulization route every four (4) hours as needed for Wheezing. Qty: 100 Each, Refills: 11      ADVAIR DISKUS 250-50 mcg/dose diskus inhaler INHALE 1 PUFF BY MOUTH TWICE DAILY  Qty: 60 Inhaler, Refills: 11      ezetimibe (ZETIA) 10 mg tablet Take 1 Tab by mouth daily. Qty: 30 Tab, Refills: 11      amLODIPine (NORVASC) 10 mg tablet Take 1 Tab by mouth daily. Qty: 30 Tab, Refills: 11      polyethylene glycol (MIRALAX) 17 gram/dose powder Take 17 g by mouth daily.   Qty: 527 g, Refills: 11      levocetirizine (XYZAL) 5 mg tablet TAKE 1 TABLET BY MOUTH DAILY AS NEEDED FOR ALLERGIES  Qty: 30 Tab, Refills: 11      levothyroxine (SYNTHROID) 100 mcg tablet Take 1 tab every day Monday to Friday  Qty: 30 Tab, Refills: 11      alendronate (FOSAMAX) 70 mg tablet TAKE 1 TABLET BY MOUTH EVERY SUNDAY  Qty: 4 Tab, Refills: 11 fluticasone (FLONASE) 50 mcg/actuation nasal spray USE 2 SPRAYS IN BOTH NOSTRILS DAILY  Qty: 48 Bottle, Refills: 11      hydrALAZINE (APRESOLINE) 25 mg tablet TAKE 1 TABLET BY MOUTH TWICE DAILY  Qty: 30 Tab, Refills: 11      traZODone (DESYREL) 50 mg tablet       albuterol (PROVENTIL HFA, VENTOLIN HFA, PROAIR HFA) 90 mcg/actuation inhaler Take 2 Puffs by inhalation every four (4) hours as needed for Wheezing. Qty: 3 Inhaler, Refills: 11           2. Follow-up Information     Follow up With Specialties Details Why Dino Sprague MD Orthopedic Surgery In 1 week  [address] [facility] [address] [facility] [address]  [phone]          3. Return to ED if worse   4. Discharge Medication List as of 9/11/2021  5:37 PM            Diagnosis     Clinical Impression:   1. Knee effusion, right    2. Primary osteoarthritis of right knee        Attestations:    Inocente Hoover PA-C    Please note that this dictation was completed with [employer], the computer voice recognition software. Quite often unanticipated grammatical, syntax, homophones, and other interpretive errors are inadvertently transcribed by the computer software. Please disregard these errors. Please excuse any errors that have escaped final proofreading. Thank you.

## 2021-09-11 NOTE — DISCHARGE INSTRUCTIONS
Thank you! Thank you for allowing me to care for you in the emergency department. I sincerely hope that you are satisfied with your visit today. It is my goal to provide you with excellent care. Below you will find a list of your labs and imaging from your visit today. Should you have any questions regarding these results please do not hesitate to call the emergency department. Labs -   No results found for this or any previous visit (from the past 12 hour(s)). Radiologic Studies -   XR KNEE RT 3 V   Final Result   1. No acute osseous injury. 2. Moderate to severe osteoarthritis. 3. Moderate knee joint effusion. CT Results  (Last 48 hours)      None          CXR Results  (Last 48 hours)      None               If you feel that you have not received excellent quality care or timely care, please ask to speak to the nurse manager. Please choose us in the future for your continued health care needs. ------------------------------------------------------------------------------------------------------------  The exam and treatment you received in the Emergency Department were for an urgent problem and are not intended as complete care. It is important that you follow-up with a doctor, nurse practitioner, or physician assistant to:  (1) confirm your diagnosis,  (2) re-evaluation of changes in your illness and treatment, and  (3) for ongoing care. If your symptoms become worse or you do not improve as expected and you are unable to reach your usual health care provider, you should return to the Emergency Department. We are available 24 hours a day. Please take your discharge instructions with you when you go to your follow-up appointment. If you have any problem arranging a follow-up appointment, contact the Emergency Department immediately. If a prescription has been provided, please have it filled as soon as possible to prevent a delay in treatment.  Read the entire medication instruction sheet provided to you by the pharmacy. If you have any questions or reservations about taking the medication due to side effects or interactions with other medications, please call your primary care physician or contact the ER to speak with the charge nurse. Make an appointment with your family doctor or the physician you were referred to for follow-up of this visit as instructed on your discharge paperwork, as this is a mandatory follow-up. Return to the ER if you are unable to be seen or if you are unable to be seen in a timely manner. If you have any problem arranging the follow-up visit, contact the Emergency Department immediately.

## 2021-09-12 LAB
BACTERIA SPEC CULT: NORMAL
BACTERIA SPEC CULT: NORMAL
GRAM STN SPEC: NORMAL
GRAM STN SPEC: NORMAL
SPECIAL REQUESTS,SREQ: NORMAL

## 2022-01-31 ENCOUNTER — HOSPITAL ENCOUNTER (EMERGENCY)
Age: 87
Discharge: HOME | End: 2022-01-31
Attending: EMERGENCY MEDICINE
Payer: MEDICARE

## 2022-01-31 VITALS
SYSTOLIC BLOOD PRESSURE: 105 MMHG | HEART RATE: 117 BPM | HEIGHT: 63 IN | TEMPERATURE: 98.3 F | OXYGEN SATURATION: 97 % | BODY MASS INDEX: 25.34 KG/M2 | WEIGHT: 143 LBS | RESPIRATION RATE: 19 BRPM | DIASTOLIC BLOOD PRESSURE: 66 MMHG

## 2022-01-31 DIAGNOSIS — S16.1XXA STRAIN OF NECK MUSCLE, INITIAL ENCOUNTER: Primary | ICD-10-CM

## 2022-01-31 PROCEDURE — 99282 EMERGENCY DEPT VISIT SF MDM: CPT

## 2022-01-31 RX ORDER — DICLOFENAC SODIUM 10 MG/G
GEL TOPICAL 4 TIMES DAILY
Qty: 100 G | Refills: 0 | Status: SHIPPED | OUTPATIENT
Start: 2022-01-31

## 2022-01-31 RX ORDER — METHOCARBAMOL 500 MG/1
500 TABLET, FILM COATED ORAL 3 TIMES DAILY
Qty: 10 TABLET | Refills: 0 | Status: SHIPPED
Start: 2022-01-31 | End: 2022-03-07

## 2022-01-31 NOTE — ED PROVIDER NOTES
EMERGENCY DEPARTMENT HISTORY AND PHYSICAL EXAM      Date: 1/31/2022  Patient Name: Mayra Ceballos    History of Presenting Illness     Chief Complaint   Patient presents with    Neck Pain       History Provided By: Patient    HPI: Mayra Ceballos, 80 y.o. female with a past medical history significant hypertension presents to the ED with cc of bilateral cervical muscle pain that goes up into her head. She says it is tender to touch is been going on for the past week or 2. She is accompanied by her granddaughter who conveys the same relationship to her symptoms. She is not treated with anything and is failed to improve. There are no other complaints, changes, or physical findings at this time. PCP: João Lopez MD    No current facility-administered medications on file prior to encounter. Current Outpatient Medications on File Prior to Encounter   Medication Sig Dispense Refill    naproxen (NAPROSYN) 500 mg tablet Take 1 Tab by mouth two (2) times daily (with meals). 20 Tab 0    montelukast (SINGULAIR) 10 mg tablet TAKE 1 TABLET BY MOUTH DAILY 30 Tab 11    traMADol (ULTRAM) 50 mg tablet TAKE 1 TABLET BY MOUTH EVERY 8 HOURS AS NEEDED 90 Tab 2    metoprolol succinate (TOPROL-XL) 25 mg XL tablet TAKE 1/2 TABLET BY MOUTH DAILY 90 Tab 11    atorvastatin (LIPITOR) 20 mg tablet Take 1 tablet daily 30 Tab 11    albuterol (PROVENTIL VENTOLIN) 2.5 mg /3 mL (0.083 %) nebulizer solution 3 mL by Nebulization route every four (4) hours as needed for Wheezing. 100 Each 11    ADVAIR DISKUS 250-50 mcg/dose diskus inhaler INHALE 1 PUFF BY MOUTH TWICE DAILY 60 Inhaler 11    ezetimibe (ZETIA) 10 mg tablet Take 1 Tab by mouth daily. 30 Tab 11    amLODIPine (NORVASC) 10 mg tablet Take 1 Tab by mouth daily. 30 Tab 11    polyethylene glycol (MIRALAX) 17 gram/dose powder Take 17 g by mouth daily.  527 g 11    levocetirizine (XYZAL) 5 mg tablet TAKE 1 TABLET BY MOUTH DAILY AS NEEDED FOR ALLERGIES 30 Tab 11    levothyroxine (SYNTHROID) 100 mcg tablet Take 1 tab every day Monday to Friday 30 Tab 11    alendronate (FOSAMAX) 70 mg tablet TAKE 1 TABLET BY MOUTH EVERY  4 Tab 11    fluticasone (FLONASE) 50 mcg/actuation nasal spray USE 2 SPRAYS IN BOTH NOSTRILS DAILY 48 Bottle 11    hydrALAZINE (APRESOLINE) 25 mg tablet TAKE 1 TABLET BY MOUTH TWICE DAILY 30 Tab 11    traZODone (DESYREL) 50 mg tablet       albuterol (PROVENTIL HFA, VENTOLIN HFA, PROAIR HFA) 90 mcg/actuation inhaler Take 2 Puffs by inhalation every four (4) hours as needed for Wheezing. 3 Inhaler 11       Past History     Past Medical History:  Past Medical History:   Diagnosis Date    Asthma     Bereavement 3/30/16    63 yo con  - multiple myleoma     Cholelithiasis     Cough     Dyslipidemia     Hip pain, chronic, left 2017    Hypertension     Low back pain 16    Osteoarthritis     Osteoporosis 16    Prediabetes     [facility]/P colonoscopy 7/15    [facility]    Trigger finger, right 2017    Wedge compression fx of second thoracic vertebra with routine healing 16    t11       Past Surgical History:  Past Surgical History:   Procedure Laterality Date    HX COLONOSCOPY      HX GYN         Family History:  Family History   Family history unknown: Yes   Problem Relation Age of Onset    Family history unknown: Yes    Cancer Son        Social History:  Social History     Tobacco Use    Smoking status: Never Smoker    Smokeless tobacco: Never Used   Vaping Use    Vaping Use: Never used   Substance Use Topics    Alcohol use: Not Currently    Drug use: Never       Allergies: Allergies   Allergen Reactions    Crestor [Rosuvastatin] Swelling         Review of Systems     Review of Systems   Constitutional: Negative. Negative for appetite change, chills, fatigue and fever. HENT: Negative. Negative for congestion and sinus pain. Eyes: Negative. Negative for pain and visual disturbance. Respiratory: Negative. Negative for chest tightness and shortness of breath. Cardiovascular: Negative. Negative for chest pain. Gastrointestinal: Negative. Negative for abdominal pain, diarrhea, nausea and vomiting. Genitourinary: Negative. Negative for difficulty urinating. No discharge   Musculoskeletal: Positive for neck pain. Negative for arthralgias. Skin: Negative. Negative for rash. Neurological: Negative. Negative for weakness and headaches. Hematological: Negative. Psychiatric/Behavioral: Negative. Negative for agitation. The patient is not nervous/anxious. All other systems reviewed and are negative. Physical Exam     Physical Exam  Constitutional:       Appearance: Normal appearance. Neck:      Vascular: No carotid bruit. Musculoskeletal:         General: Tenderness present. No swelling, deformity or signs of injury. Cervical back: Normal range of motion and neck supple. Tenderness present. No rigidity. Lymphadenopathy:      Cervical: No cervical adenopathy. Skin:     General: Skin is warm and dry. Neurological:      Mental Status: She is alert. Lab and Diagnostic Study Results     Labs -   No results found for this or any previous visit (from the past 12 hour(s)). Radiologic Studies -   @lastxrresult@  CT Results  (Last 48 hours)    None        CXR Results  (Last 48 hours)    None            Medical Decision Making   - I am the first provider for this patient. - I reviewed the vital signs, available nursing notes, past medical history, past surgical history, family history and social history. - Initial assessment performed. The patients presenting problems have been discussed, and they are in agreement with the care plan formulated and outlined with them. I have encouraged them to ask questions as they arise throughout their visit. Vital Signs-Reviewed the patient's vital signs.   Patient Vitals for the past 12 hrs:   Temp Pulse Resp BP SpO2   01/31/22 1037 98.3 °F (36.8 °C) (!) 117 19 105/66 97 %       Records Reviewed: Nursing Notes    The patient presents with musculoskeletal neck pain with a differential diagnosis of cervical strain. We discussed exercises the patient symptom as well as anti-inflammatories      ED Course:          Provider Notes (Medical Decision Making):   70-year-old female with musculoskeletal neck pain. Will treat with anti-inflammatories muscle relaxers as an outpatient  MDM       Procedures   Medical Decision Makingedical Decision Making  Performed by: Danny Reddy MD  PROCEDURES:  Procedures       Disposition   Disposition: Condition stable    Discharged    DISCHARGE PLAN:  1. Current Discharge Medication List      CONTINUE these medications which have NOT CHANGED    Details   naproxen (NAPROSYN) 500 mg tablet Take 1 Tab by mouth two (2) times daily (with meals). Qty: 20 Tab, Refills: 0      montelukast (SINGULAIR) 10 mg tablet TAKE 1 TABLET BY MOUTH DAILY  Qty: 30 Tab, Refills: 11      traMADol (ULTRAM) 50 mg tablet TAKE 1 TABLET BY MOUTH EVERY 8 HOURS AS NEEDED  Qty: 90 Tab, Refills: 2    Associated Diagnoses: Hip pain, chronic, left      metoprolol succinate (TOPROL-XL) 25 mg XL tablet TAKE 1/2 TABLET BY MOUTH DAILY  Qty: 90 Tab, Refills: 11      atorvastatin (LIPITOR) 20 mg tablet Take 1 tablet daily  Qty: 30 Tab, Refills: 11      albuterol (PROVENTIL VENTOLIN) 2.5 mg /3 mL (0.083 %) nebulizer solution 3 mL by Nebulization route every four (4) hours as needed for Wheezing. Qty: 100 Each, Refills: 11      ADVAIR DISKUS 250-50 mcg/dose diskus inhaler INHALE 1 PUFF BY MOUTH TWICE DAILY  Qty: 60 Inhaler, Refills: 11      ezetimibe (ZETIA) 10 mg tablet Take 1 Tab by mouth daily. Qty: 30 Tab, Refills: 11      amLODIPine (NORVASC) 10 mg tablet Take 1 Tab by mouth daily. Qty: 30 Tab, Refills: 11      polyethylene glycol (MIRALAX) 17 gram/dose powder Take 17 g by mouth daily.   Qty: 527 g, Refills: 11      levocetirizine (XYZAL) 5 mg tablet TAKE 1 TABLET BY MOUTH DAILY AS NEEDED FOR ALLERGIES  Qty: 30 Tab, Refills: 11      levothyroxine (SYNTHROID) 100 mcg tablet Take 1 tab every day Monday to Friday  Qty: 30 Tab, Refills: 11      alendronate (FOSAMAX) 70 mg tablet TAKE 1 TABLET BY MOUTH EVERY SUNDAY  Qty: 4 Tab, Refills: 11      fluticasone (FLONASE) 50 mcg/actuation nasal spray USE 2 SPRAYS IN BOTH NOSTRILS DAILY  Qty: 48 Bottle, Refills: 11      hydrALAZINE (APRESOLINE) 25 mg tablet TAKE 1 TABLET BY MOUTH TWICE DAILY  Qty: 30 Tab, Refills: 11      traZODone (DESYREL) 50 mg tablet       albuterol (PROVENTIL HFA, VENTOLIN HFA, PROAIR HFA) 90 mcg/actuation inhaler Take 2 Puffs by inhalation every four (4) hours as needed for Wheezing. Qty: 3 Inhaler, Refills: 11           2. Follow-up Information    None       3. Return to ED if worse   4. Current Discharge Medication List      START taking these medications    Details   diclofenac (Voltaren) 1 % gel Apply  to affected area four (4) times daily. Qty: 100 g, Refills: 0  Start date: 1/31/2022      methocarbamoL (Robaxin) 500 mg tablet Take 1 Tablet by mouth three (3) times daily. Qty: 10 Tablet, Refills: 0  Start date: 1/31/2022               Diagnosis     Clinical Impression:   1. Strain of neck muscle, initial encounter        Attestations:    Lauren Gutiérrez MD    Please note that this dictation was completed with [employer], the [employer] voice recognition software. Quite often unanticipated grammatical, syntax, homophones, and other interpretive errors are inadvertently transcribed by the computer software. Please disregard these errors. Please excuse any errors that have escaped final proofreading. Thank you.

## 2022-01-31 NOTE — ED TRIAGE NOTES
Pt c/o neck pain that she noticed about a week ago. . started in her body and has worked it's way up to her neck.  Denies injury

## 2022-03-07 ENCOUNTER — APPOINTMENT (OUTPATIENT)
Dept: ULTRASOUND IMAGING | Age: 87
DRG: 872 | End: 2022-03-07
Attending: INTERNAL MEDICINE
Payer: MEDICARE

## 2022-03-07 ENCOUNTER — APPOINTMENT (OUTPATIENT)
Dept: CT IMAGING | Age: 87
DRG: 872 | End: 2022-03-07
Attending: PHYSICIAN ASSISTANT
Payer: MEDICARE

## 2022-03-07 ENCOUNTER — HOSPITAL ENCOUNTER (INPATIENT)
Age: 87
LOS: 9 days | Discharge: HOME HEALTH | DRG: 872 | End: 2022-03-16
Attending: INTERNAL MEDICINE | Admitting: INTERNAL MEDICINE
Payer: MEDICARE

## 2022-03-07 ENCOUNTER — APPOINTMENT (OUTPATIENT)
Dept: GENERAL RADIOLOGY | Age: 87
DRG: 872 | End: 2022-03-07
Attending: PHYSICIAN ASSISTANT
Payer: MEDICARE

## 2022-03-07 DIAGNOSIS — N17.9 AKI (ACUTE KIDNEY INJURY) (HCC): Primary | ICD-10-CM

## 2022-03-07 DIAGNOSIS — E86.0 DEHYDRATION: ICD-10-CM

## 2022-03-07 LAB
ALBUMIN SERPL-MCNC: 2.6 G/DL (ref 3.5–5)
ALBUMIN/GLOB SERPL: 0.4 {RATIO} (ref 1.1–2.2)
ALP SERPL-CCNC: 124 U/L (ref 45–117)
ALT SERPL-CCNC: 16 U/L (ref 12–78)
ANION GAP SERPL CALC-SCNC: 10 MMOL/L (ref 5–15)
APPEARANCE UR: CLEAR
AST SERPL W P-5'-P-CCNC: 35 U/L (ref 15–37)
BACTERIA URNS QL MICRO: NEGATIVE /HPF
BASOPHILS # BLD: 0 K/UL (ref 0–0.1)
BASOPHILS NFR BLD: 0 % (ref 0–1)
BILIRUB SERPL-MCNC: 1 MG/DL (ref 0.2–1)
BILIRUB UR QL: NEGATIVE
BUN SERPL-MCNC: 66 MG/DL (ref 6–20)
BUN/CREAT SERPL: 32 (ref 12–20)
CA-I BLD-MCNC: 9 MG/DL (ref 8.5–10.1)
CHLORIDE SERPL-SCNC: 104 MMOL/L (ref 97–108)
CO2 SERPL-SCNC: 20 MMOL/L (ref 21–32)
COLOR UR: ABNORMAL
COVID-19 RAPID TEST, COVR: NOT DETECTED
CREAT SERPL-MCNC: 2.05 MG/DL (ref 0.55–1.02)
DIFFERENTIAL METHOD BLD: ABNORMAL
EOSINOPHIL # BLD: 5.1 K/UL (ref 0–0.4)
EOSINOPHIL NFR BLD: 33 % (ref 0–7)
ERYTHROCYTE [DISTWIDTH] IN BLOOD BY AUTOMATED COUNT: 23.3 % (ref 11.5–14.5)
GLOBULIN SER CALC-MCNC: 6.1 G/DL (ref 2–4)
GLUCOSE SERPL-MCNC: 124 MG/DL (ref 65–100)
GLUCOSE UR STRIP.AUTO-MCNC: NEGATIVE MG/DL
HCT VFR BLD AUTO: 28.5 % (ref 35–47)
HGB BLD-MCNC: 8.9 G/DL (ref 11.5–16)
HGB UR QL STRIP: ABNORMAL
IMM GRANULOCYTES # BLD AUTO: 0 K/UL
IMM GRANULOCYTES NFR BLD AUTO: 0 %
KETONES UR QL STRIP.AUTO: NEGATIVE MG/DL
LEUKOCYTE ESTERASE UR QL STRIP.AUTO: NEGATIVE
LYMPHOCYTES # BLD: 1.6 K/UL (ref 0.8–3.5)
LYMPHOCYTES NFR BLD: 10 % (ref 12–49)
MAGNESIUM SERPL-MCNC: 2.5 MG/DL (ref 1.6–2.4)
MCH RBC QN AUTO: 24.7 PG (ref 26–34)
MCHC RBC AUTO-ENTMCNC: 31.2 G/DL (ref 30–36.5)
MCV RBC AUTO: 78.9 FL (ref 80–99)
MONOCYTES # BLD: 0.3 K/UL (ref 0–1)
MONOCYTES NFR BLD: 2 % (ref 5–13)
MUCOUS THREADS URNS QL MICRO: ABNORMAL /LPF
NEUTS SEG # BLD: 8.5 K/UL (ref 1.8–8)
NEUTS SEG NFR BLD: 55 % (ref 32–75)
NITRITE UR QL STRIP.AUTO: NEGATIVE
NRBC # BLD: 0.02 K/UL (ref 0–0.01)
NRBC BLD-RTO: 0.1 PER 100 WBC
PH UR STRIP: 5 [PH] (ref 5–8)
PLATELET # BLD AUTO: 268 K/UL (ref 150–400)
PLATELET COMMENTS,PCOM: ABNORMAL
POTASSIUM SERPL-SCNC: 5 MMOL/L (ref 3.5–5.1)
PROT SERPL-MCNC: 8.7 G/DL (ref 6.4–8.2)
PROT UR STRIP-MCNC: 30 MG/DL
RBC # BLD AUTO: 3.61 M/UL (ref 3.8–5.2)
RBC #/AREA URNS HPF: ABNORMAL /HPF (ref 0–5)
RBC MORPH BLD: ABNORMAL
SODIUM SERPL-SCNC: 134 MMOL/L (ref 136–145)
SP GR UR REFRACTOMETRY: 1.02 (ref 1–1.03)
TROPONIN-HIGH SENSITIVITY: 17 NG/L (ref 0–51)
UA: UC IF INDICATED,UAUC: ABNORMAL
UROBILINOGEN UR QL STRIP.AUTO: 0.1 EU/DL (ref 0.1–1)
WBC # BLD AUTO: 15.5 K/UL (ref 3.6–11)
WBC URNS QL MICRO: ABNORMAL /HPF (ref 0–4)

## 2022-03-07 PROCEDURE — 84484 ASSAY OF TROPONIN QUANT: CPT

## 2022-03-07 PROCEDURE — 81001 URINALYSIS AUTO W/SCOPE: CPT

## 2022-03-07 PROCEDURE — 74011250637 HC RX REV CODE- 250/637: Performed by: INTERNAL MEDICINE

## 2022-03-07 PROCEDURE — 74011250636 HC RX REV CODE- 250/636: Performed by: INTERNAL MEDICINE

## 2022-03-07 PROCEDURE — 74176 CT ABD & PELVIS W/O CONTRAST: CPT

## 2022-03-07 PROCEDURE — 87635 SARS-COV-2 COVID-19 AMP PRB: CPT

## 2022-03-07 PROCEDURE — 65270000029 HC RM PRIVATE

## 2022-03-07 PROCEDURE — 80053 COMPREHEN METABOLIC PANEL: CPT

## 2022-03-07 PROCEDURE — 71045 X-RAY EXAM CHEST 1 VIEW: CPT

## 2022-03-07 PROCEDURE — 36415 COLL VENOUS BLD VENIPUNCTURE: CPT

## 2022-03-07 PROCEDURE — 93005 ELECTROCARDIOGRAM TRACING: CPT

## 2022-03-07 PROCEDURE — 74011000250 HC RX REV CODE- 250: Performed by: INTERNAL MEDICINE

## 2022-03-07 PROCEDURE — 83735 ASSAY OF MAGNESIUM: CPT

## 2022-03-07 PROCEDURE — 74011250636 HC RX REV CODE- 250/636: Performed by: PHYSICIAN ASSISTANT

## 2022-03-07 PROCEDURE — 99285 EMERGENCY DEPT VISIT HI MDM: CPT

## 2022-03-07 PROCEDURE — 76770 US EXAM ABDO BACK WALL COMP: CPT

## 2022-03-07 PROCEDURE — 85025 COMPLETE CBC W/AUTO DIFF WBC: CPT

## 2022-03-07 RX ORDER — LOSARTAN POTASSIUM 25 MG/1
1 TABLET ORAL DAILY
COMMUNITY
Start: 2022-02-04 | End: 2022-03-07 | Stop reason: ALTCHOICE

## 2022-03-07 RX ORDER — ATORVASTATIN CALCIUM 40 MG/1
40 TABLET, FILM COATED ORAL EVERY EVENING
Status: DISCONTINUED | OUTPATIENT
Start: 2022-03-07 | End: 2022-03-16 | Stop reason: HOSPADM

## 2022-03-07 RX ORDER — ATORVASTATIN CALCIUM 40 MG/1
1 TABLET, FILM COATED ORAL EVERY EVENING
COMMUNITY
Start: 2022-02-04

## 2022-03-07 RX ORDER — SODIUM CHLORIDE 0.9 % (FLUSH) 0.9 %
5-40 SYRINGE (ML) INJECTION AS NEEDED
Status: DISCONTINUED | OUTPATIENT
Start: 2022-03-07 | End: 2022-03-16 | Stop reason: HOSPADM

## 2022-03-07 RX ORDER — MIRTAZAPINE 45 MG/1
1 TABLET, FILM COATED ORAL
COMMUNITY
Start: 2022-02-04

## 2022-03-07 RX ORDER — SODIUM CHLORIDE 0.9 % (FLUSH) 0.9 %
5-40 SYRINGE (ML) INJECTION EVERY 8 HOURS
Status: DISCONTINUED | OUTPATIENT
Start: 2022-03-07 | End: 2022-03-09 | Stop reason: SDUPTHER

## 2022-03-07 RX ORDER — PANTOPRAZOLE SODIUM 40 MG/1
40 TABLET, DELAYED RELEASE ORAL DAILY
Status: DISCONTINUED | OUTPATIENT
Start: 2022-03-08 | End: 2022-03-16 | Stop reason: HOSPADM

## 2022-03-07 RX ORDER — ALBUTEROL SULFATE 2.5 MG/.5ML
2.5 SOLUTION RESPIRATORY (INHALATION)
Status: DISCONTINUED | OUTPATIENT
Start: 2022-03-07 | End: 2022-03-16 | Stop reason: HOSPADM

## 2022-03-07 RX ORDER — ALBUTEROL SULFATE 2.5 MG/.5ML
2.5 SOLUTION RESPIRATORY (INHALATION)
Status: DISCONTINUED | OUTPATIENT
Start: 2022-03-07 | End: 2022-03-07

## 2022-03-07 RX ORDER — SODIUM POLYSTYRENE SULFONATE 15 G/60ML
15 SUSPENSION ORAL; RECTAL
Status: ACTIVE | OUTPATIENT
Start: 2022-03-07 | End: 2022-03-08

## 2022-03-07 RX ORDER — PREDNISONE 20 MG/1
20 TABLET ORAL
Status: DISCONTINUED | OUTPATIENT
Start: 2022-03-08 | End: 2022-03-11

## 2022-03-07 RX ORDER — MONTELUKAST SODIUM 10 MG/1
10 TABLET ORAL DAILY
Status: DISCONTINUED | OUTPATIENT
Start: 2022-03-08 | End: 2022-03-16 | Stop reason: HOSPADM

## 2022-03-07 RX ORDER — LEVOTHYROXINE SODIUM 100 UG/1
100 TABLET ORAL
Status: DISCONTINUED | OUTPATIENT
Start: 2022-03-08 | End: 2022-03-16 | Stop reason: HOSPADM

## 2022-03-07 RX ORDER — BENZONATATE 100 MG/1
100 CAPSULE ORAL
Status: DISCONTINUED | OUTPATIENT
Start: 2022-03-07 | End: 2022-03-16 | Stop reason: HOSPADM

## 2022-03-07 RX ORDER — DICLOFENAC SODIUM 10 MG/G
2 GEL TOPICAL 4 TIMES DAILY
Status: DISCONTINUED | OUTPATIENT
Start: 2022-03-07 | End: 2022-03-16 | Stop reason: HOSPADM

## 2022-03-07 RX ORDER — TRIAMTERENE AND HYDROCHLOROTHIAZIDE 37.5; 25 MG/1; MG/1
1 CAPSULE ORAL DAILY
COMMUNITY
Start: 2022-01-09 | End: 2022-03-07 | Stop reason: ALTCHOICE

## 2022-03-07 RX ORDER — PANTOPRAZOLE SODIUM 40 MG/1
40 TABLET, DELAYED RELEASE ORAL DAILY
COMMUNITY
Start: 2022-01-09

## 2022-03-07 RX ORDER — HEPARIN SODIUM 5000 [USP'U]/ML
5000 INJECTION, SOLUTION INTRAVENOUS; SUBCUTANEOUS EVERY 12 HOURS
Status: DISCONTINUED | OUTPATIENT
Start: 2022-03-07 | End: 2022-03-11

## 2022-03-07 RX ADMIN — SODIUM CHLORIDE, PRESERVATIVE FREE 10 ML: 5 INJECTION INTRAVENOUS at 21:17

## 2022-03-07 RX ADMIN — SODIUM CHLORIDE 1000 ML: 9 INJECTION, SOLUTION INTRAVENOUS at 11:00

## 2022-03-07 RX ADMIN — HEPARIN SODIUM 5000 UNITS: 5000 INJECTION INTRAVENOUS; SUBCUTANEOUS at 21:00

## 2022-03-07 RX ADMIN — MIRTAZAPINE 45 MG: 30 TABLET, FILM COATED ORAL at 20:59

## 2022-03-07 NOTE — PROGRESS NOTES
3/7/22. PCP is Dr. Larry Sainz. - seen last month. D/C Plan is home with family & a family member to transport pt home upon discharge. Pt uses cane @ times/no home health @ this time.

## 2022-03-07 NOTE — ED PROVIDER NOTES
EMERGENCY DEPARTMENT HISTORY AND PHYSICAL EXAM      Date: 3/7/2022  Patient Name: Reddy Ventura    History of Presenting Illness     Chief Complaint   Patient presents with    Fatigue       History Provided By: Patient    HPI: Reddy Ventura, 80 y.o. female with a past medical history significant for asthma, hypertension, hyperlipidemia who presents to the ED with cc of fatigue x3 days. Associated symptoms include decreased appetite, very dark urine, cough. Patient states she just feels rundown and not hungry. No particular alleviating or exacerbating symptoms. No recent injury, fall. No sick contact. Patient denies fever, chills, chest pain, shortness of breath, nausea, vomiting, diarrhea, abdominal pain. No history of kidney disease. There are no other complaints, changes, or physical findings at this time. PCP: Albin Navas MD    No current facility-administered medications on file prior to encounter. Current Outpatient Medications on File Prior to Encounter   Medication Sig Dispense Refill    diclofenac (Voltaren) 1 % gel Apply  to affected area four (4) times daily. 100 g 0    methocarbamoL (Robaxin) 500 mg tablet Take 1 Tablet by mouth three (3) times daily. 10 Tablet 0    naproxen (NAPROSYN) 500 mg tablet Take 1 Tab by mouth two (2) times daily (with meals). 20 Tab 0    montelukast (SINGULAIR) 10 mg tablet TAKE 1 TABLET BY MOUTH DAILY 30 Tab 11    traMADol (ULTRAM) 50 mg tablet TAKE 1 TABLET BY MOUTH EVERY 8 HOURS AS NEEDED 90 Tab 2    metoprolol succinate (TOPROL-XL) 25 mg XL tablet TAKE 1/2 TABLET BY MOUTH DAILY 90 Tab 11    atorvastatin (LIPITOR) 20 mg tablet Take 1 tablet daily 30 Tab 11    albuterol (PROVENTIL VENTOLIN) 2.5 mg /3 mL (0.083 %) nebulizer solution 3 mL by Nebulization route every four (4) hours as needed for Wheezing.  100 Each 11    ADVAIR DISKUS 250-50 mcg/dose diskus inhaler INHALE 1 PUFF BY MOUTH TWICE DAILY 60 Inhaler 11    ezetimibe (ZETIA) 10 mg tablet Take 1 Tab by mouth daily. 30 Tab 11    amLODIPine (NORVASC) 10 mg tablet Take 1 Tab by mouth daily. 30 Tab 11    polyethylene glycol (MIRALAX) 17 gram/dose powder Take 17 g by mouth daily. 527 g 11    levocetirizine (XYZAL) 5 mg tablet TAKE 1 TABLET BY MOUTH DAILY AS NEEDED FOR ALLERGIES 30 Tab 11    levothyroxine (SYNTHROID) 100 mcg tablet Take 1 tab every day Monday to Friday 30 Tab 11    alendronate (FOSAMAX) 70 mg tablet TAKE 1 TABLET BY MOUTH EVERY  4 Tab 11    fluticasone (FLONASE) 50 mcg/actuation nasal spray USE 2 SPRAYS IN BOTH NOSTRILS DAILY 48 Bottle 11    hydrALAZINE (APRESOLINE) 25 mg tablet TAKE 1 TABLET BY MOUTH TWICE DAILY 30 Tab 11    traZODone (DESYREL) 50 mg tablet       albuterol (PROVENTIL HFA, VENTOLIN HFA, PROAIR HFA) 90 mcg/actuation inhaler Take 2 Puffs by inhalation every four (4) hours as needed for Wheezing. 3 Inhaler 11       Past History     Past Medical History:  Past Medical History:   Diagnosis Date    Asthma     Bereavement 3/30/16    63 yo con  - multiple myleoma     Cholelithiasis     Cough     Dyslipidemia     Hip pain, chronic, left 2017    Hypertension     Low back pain 16    Osteoarthritis     Osteoporosis 16    Prediabetes     S/P colonoscopy 7/15    [facility]    Trigger finger, right 2017    Wedge compression fx of second thoracic vertebra with routine healing 16    t11       Past Surgical History:  Past Surgical History:   Procedure Laterality Date    HX COLONOSCOPY      HX GYN         Family History:  Family History   Family history unknown: Yes   Problem Relation Age of Onset    Family history unknown: Yes    Cancer Son        Social History:  Social History     Tobacco Use    Smoking status: Never Smoker    Smokeless tobacco: Never Used   Vaping Use    Vaping Use: Never used   Substance Use Topics    Alcohol use: Not Currently    Drug use: Never       Allergies:   Allergies   Allergen Reactions    Crestor [Rosuvastatin] Swelling         Review of Systems     Review of Systems   Constitutional: Positive for fatigue. Negative for chills and fever. HENT: Negative. Respiratory: Positive for cough. Negative for chest tightness, shortness of breath and wheezing. Cardiovascular: Negative for chest pain and palpitations. Gastrointestinal: Negative for abdominal pain, diarrhea, nausea and vomiting. Genitourinary: Negative for frequency and urgency. Musculoskeletal: Negative for back pain, neck pain and neck stiffness. Skin: Negative for rash. Neurological: Negative for dizziness, weakness, light-headedness and headaches. Psychiatric/Behavioral: Negative. All other systems reviewed and are negative. Physical Exam     Physical Exam  Vitals and nursing note reviewed. Constitutional:       General: She is not in acute distress. Appearance: Normal appearance. She is well-developed. She is not ill-appearing, toxic-appearing or diaphoretic. HENT:      Head: Normocephalic and atraumatic. Nose: Nose normal. No congestion or rhinorrhea. Mouth/Throat:      Mouth: Mucous membranes are dry. Pharynx: Oropharynx is clear. No oropharyngeal exudate or posterior oropharyngeal erythema. Eyes:      General: No scleral icterus. Conjunctiva/sclera: Conjunctivae normal.      Pupils: Pupils are equal, round, and reactive to light. Cardiovascular:      Rate and Rhythm: Regular rhythm. Tachycardia present. Pulses:           Radial pulses are 2+ on the right side and 2+ on the left side. Dorsalis pedis pulses are 2+ on the right side and 2+ on the left side. Heart sounds: No murmur heard. No friction rub. No gallop. Pulmonary:      Effort: Pulmonary effort is normal. No tachypnea, accessory muscle usage, respiratory distress or retractions. Breath sounds: Normal breath sounds. No stridor. No decreased breath sounds, wheezing, rhonchi or rales.    Chest:      Chest wall: No tenderness. Abdominal:      General: Bowel sounds are normal. There is no distension. Palpations: Abdomen is soft. There is no mass. Tenderness: There is no abdominal tenderness. There is no right CVA tenderness, left CVA tenderness, guarding or rebound. Musculoskeletal:         General: No deformity. Normal range of motion. Cervical back: Normal range of motion and neck supple. No rigidity. No muscular tenderness. Right lower leg: No edema. Left lower leg: No edema. Skin:     General: Skin is warm and dry. Capillary Refill: Capillary refill takes less than 2 seconds. Coloration: Skin is not jaundiced or pale. Findings: No bruising, erythema or rash. Neurological:      General: No focal deficit present. Mental Status: She is alert and oriented to person, place, and time. Mental status is at baseline. Sensory: Sensation is intact. Motor: Motor function is intact. Psychiatric:         Mood and Affect: Mood normal.         Behavior: Behavior normal. Behavior is cooperative. Thought Content: Thought content normal.         Judgment: Judgment normal.         Lab and Diagnostic Study Results     Labs -  Recent Results (from the past 24 hour(s))   CBC WITH AUTOMATED DIFF    Collection Time: 03/07/22  9:33 AM   Result Value Ref Range    WBC 15.5 (H) 3.6 - 11.0 K/uL    RBC 3.61 (L) 3.80 - 5.20 M/uL    HGB 8.9 (L) 11.5 - 16.0 g/dL    HCT 28.5 (L) 35.0 - 47.0 %    MCV 78.9 (L) 80.0 - 99.0 FL    MCH 24.7 (L) 26.0 - 34.0 PG    MCHC 31.2 30.0 - 36.5 g/dL    RDW 23.3 (H) 11.5 - 14.5 %    PLATELET 821 812 - 186 K/uL    NRBC 0.1 (H) 0.0  WBC    ABSOLUTE NRBC 0.02 (H) 0.00 - 0.01 K/uL    NEUTROPHILS 55 32 - 75 %    LYMPHOCYTES 10 (L) 12 - 49 %    MONOCYTES 2 (L) 5 - 13 %    EOSINOPHILS 33 (H) 0 - 7 %    BASOPHILS 0 0 - 1 %    IMMATURE GRANULOCYTES 0 %    ABS. NEUTROPHILS 8.5 (H) 1.8 - 8.0 K/UL    ABS. LYMPHOCYTES 1.6 0.8 - 3.5 K/UL    ABS.  MONOCYTES 0.3 0.0 - 1.0 K/UL    ABS. EOSINOPHILS 5.1 (H) 0.0 - 0.4 K/UL    ABS. BASOPHILS 0.0 0.0 - 0.1 K/UL    ABS. IMM. GRANS. 0.0 K/UL    DF Manual      PLATELET COMMENTS Large Platelets      RBC COMMENTS Anisocytosis  2+        RBC COMMENTS Teardrop cells  2+        RBC COMMENTS Poikilocytosis  3+        RBC COMMENTS Macrocytosis  1+        RBC COMMENTS Microcytosis  2+        RBC COMMENTS Polychromasia  1+        RBC COMMENTS Ovalocytes  2+       METABOLIC PANEL, COMPREHENSIVE    Collection Time: 03/07/22  9:33 AM   Result Value Ref Range    Sodium 134 (L) 136 - 145 mmol/L    Potassium 5.0 3.5 - 5.1 mmol/L    Chloride 104 97 - 108 mmol/L    CO2 20 (L) 21 - 32 mmol/L    Anion gap 10 5 - 15 mmol/L    Glucose 124 (H) 65 - 100 mg/dL    BUN 66 (H) 6 - 20 mg/dL    Creatinine 2.05 (H) 0.55 - 1.02 mg/dL    BUN/Creatinine ratio 32 (H) 12 - 20      GFR est AA 28 (L) >60 ml/min/1.73m2    GFR est non-AA 23 (L) >60 ml/min/1.73m2    Calcium 9.0 8.5 - 10.1 mg/dL    Bilirubin, total 1.0 0.2 - 1.0 mg/dL    AST (SGOT) 35 15 - 37 U/L    ALT (SGPT) 16 12 - 78 U/L    Alk.  phosphatase 124 (H) 45 - 117 U/L    Protein, total 8.7 (H) 6.4 - 8.2 g/dL    Albumin 2.6 (L) 3.5 - 5.0 g/dL    Globulin 6.1 (H) 2.0 - 4.0 g/dL    A-G Ratio 0.4 (L) 1.1 - 2.2     URINALYSIS W/ REFLEX CULTURE    Collection Time: 03/07/22  9:33 AM    Specimen: Urine   Result Value Ref Range    Color Yellow/Straw      Appearance Clear Clear      Specific gravity 1.016 1.003 - 1.030      pH (UA) 5.0 5.0 - 8.0      Protein 30 (A) Negative mg/dL    Glucose Negative Negative mg/dL    Ketone Negative Negative mg/dL    Bilirubin Negative Negative      Blood Small (A) Negative      Urobilinogen 0.1 0.1 - 1.0 EU/dL    Nitrites Negative Negative      Leukocyte Esterase Negative Negative      UA:UC IF INDICATED Culture not indicated by UA result Culture not indicated by UA result      WBC 0-4 0 - 4 /hpf    RBC 10-20 0 - 5 /hpf    Bacteria Negative Negative /hpf    Mucus Trace /lpf MAGNESIUM    Collection Time: 03/07/22  9:33 AM   Result Value Ref Range    Magnesium 2.5 (H) 1.6 - 2.4 mg/dL   TROPONIN-HIGH SENSITIVITY    Collection Time: 03/07/22  9:33 AM   Result Value Ref Range    Troponin-High Sensitivity 17 0 - 51 ng/L   COVID-19 RAPID TEST    Collection Time: 03/07/22 11:48 AM   Result Value Ref Range    COVID-19 rapid test Not Detected Not Detected         Radiologic Studies -   CT ABD PELV WO CONT   Final Result   1. Cholelithiasis, but no CT evidence of acute cholecystitis. 2. Small bilateral pleural effusions with basilar atelectasis. 3. Atherosclerotic disease. 4. Nonobstructing renal stones. No ureteral or bladder stone. No hydronephrosis. 5. Please see above report for further details. XR CHEST PORT   Final Result   No acute pulmonary process. CT Results  (Last 48 hours)               03/07/22 1138  CT ABD PELV WO CONT Final result    Impression:  1. Cholelithiasis, but no CT evidence of acute cholecystitis. 2. Small bilateral pleural effusions with basilar atelectasis. 3. Atherosclerotic disease. 4. Nonobstructing renal stones. No ureteral or bladder stone. No hydronephrosis. 5. Please see above report for further details. Narrative:  Examination: CT abdomen and pelvis without contrast.       HISTORY: Abdominal pain. Technique: Transaxial computed tomographic images of the abdomen and pelvis were   obtained without intravenous contrast. Coronal and sagittal reconstructions were   created. Dose Reduction: All CT scans at this facility are performed using dose reduction   optimization techniques as appropriate to a performed exam including the   following: Automated exposure control, adjustments of the mA and/or kV according   to patient size, or use of iterative reconstruction technique. COMPARISON: Abdomen and pelvis CT dated 7/11/2020. FINDINGS: There is mild basilar atelectasis. There is a small right pleural   effusion. There is a small amount of fluid partially loculated along the major   fissure. There is trace left pleural effusion. There is no pneumothorax. No focal hepatic lesion identified on this noncontrast study. There is a   calcified stone in the gallbladder. There is no gallbladder wall thickening or   pericholecystic fluid. There is no biliary duct dilation. The pancreas, spleen,   and adrenal glands have a normal noncontrast appearance. There are punctate   nonobstructing stones in both kidneys. There is no ureteral or bladder stone. There is no hydronephrosis. There is no bowel obstruction. There is tortuosity of the sigmoid colon, which   extends into the upper abdomen, similar when compared with the prior study. There are scattered colonic diverticula, but no CT evidence of acute   diverticulitis. The appendix is normal. There is no focal bowel wall thickening. The urinary bladder is normal. The uterus is anteverted. There are   calcifications, which may be related to calcified vasculature. The abdominal   aorta and branch vessels are atherosclerotic. There is no free fluid or free   intraperitoneal gas. There is no abdominal or pelvic lymphadenopathy. There is grade 1 anterolisthesis of L4 on L5. There is multilevel degenerative   disc disease and facet osteoarthritis. There is chronic T11 compression   deformity. Medical Decision Making   - I am the first provider for this patient. - I reviewed the vital signs, available nursing notes, past medical history, past surgical history, family history and social history. - Initial assessment performed. The patients presenting problems have been discussed, and they are in agreement with the care plan formulated and outlined with them. I have encouraged them to ask questions as they arise throughout their visit. Vital Signs-Reviewed the patient's vital signs.   Patient Vitals for the past 24 hrs:   Temp Pulse Resp BP SpO2   03/07/22 1200  (!) 108 16 127/82 93 %   03/07/22 1100  (!) 108 18 126/73 94 %   03/07/22 1000  (!) 112 19 116/85 94 %   03/07/22 0915 97.9 °F (36.6 °C) (!) 119 14 (!) 138/96 93 %       EKG interpretation: (Preliminary) completed at 0958 read by Dr. Solis Links at 1002  Rhythm: sinus tachycardia; and regular . Rate (approx.): 114; Axis: normal; P wave: normal; QRS interval: normal ; ST/T wave: normal; , QTc 443      Records Reviewed: Nursing Notes and Old Medical Records    The patient presents with fatigue with a differential diagnosis of UTI, dehydration, arrhythmia, electrolyte imbalance      ED Course:     ED Course as of 03/07/22 [facility] Mar 07, 2022   1254 CONSULT NOTE:  Consultant: Dr. Pankaj Cohen  Specialty: Admitting Medicine  Discussed pt's history, disposition, and available diagnostic and imaging results. Reviewed care plans. Patient will be admitted to the hospital for further management. ROSANA Blanco     [NO]      ED Course User Index  [NO] ROSANA Garces         Provider Notes (Medical Decision Making):     MDM  Number of Diagnoses or Management Options  JORDANA (acute kidney injury) [facility])  Dehydration  Diagnosis management comments:     35-year-old female with fatigue and decreased appetite. CBC with leukocytosis 18.5, anemia with Hgb 8.9 which decreased from 10.8 4 years ago but patient not complaining of any bleeding. CMP with hyponatremia 134, JORDANA with creatinine 2.05 and BUN 66 compared to 1.13 and 16 4 years ago. UA negative for infection. Troponin negative. EKG with sinus tachycardia. CT abdomen pelvis without any acute process. Will admit patient to hospital for IV fluid hydration.        Amount and/or Complexity of Data Reviewed  Clinical lab tests: ordered and reviewed  Tests in the radiology section of CPT®: ordered and reviewed  Review and summarize past medical records: yes  Discuss the patient with other providers: yes  Independent visualization of images, tracings, or specimens: yes    Patient Progress  Patient progress: stable           Disposition   Disposition: Admit to hospital    Diagnosis     Clinical Impression:   1. JORDANA (acute kidney injury) ([facility] [address]    2. Dehydration        Attestations:    ROSANA Olivera    Please note that this dictation was completed with [employer], the computer voice recognition software. Quite often unanticipated grammatical, syntax, homophones, and other interpretive errors are inadvertently transcribed by the computer software. Please disregard these errors. Please excuse any errors that have escaped final proofreading. Thank you.

## 2022-03-07 NOTE — H&P
History and Physical    Patient: Lulu Marmolejo MRN: [id number]  SSN: [id number]    YOB: 1933  Age: 80 y.o. Sex: female      Subjective:      Lulu Marmolejo is a 80 y.o. female who came to the hospital because she was not feeling well. Patient claims she was not drinking enough fluids emergency department she was found to be in acute kidney injury with volume depletion. Patient has been on ARB and also was on Dyazide    Past Medical History:   Diagnosis Date    Asthma     Bereavement 3/30/16    63 yo con  - multiple myleoma     Cholelithiasis     Cough     Dyslipidemia     Hip pain, chronic, left 2017    Hypertension     Low back pain 16    Osteoarthritis     Osteoporosis 16    Prediabetes     S/P colonoscopy 7/15    [facility]    Trigger finger, right 2017    Wedge compression fx of second thoracic vertebra with routine healing 16    t11     Past Surgical History:   Procedure Laterality Date    HX COLONOSCOPY      HX GYN        Family History   Family history unknown: Yes   Problem Relation Age of Onset    Family history unknown: Yes    Cancer Son      Social History     Tobacco Use    Smoking status: Never Smoker    Smokeless tobacco: Never Used   Substance Use Topics    Alcohol use: Not Currently      Prior to Admission medications    Medication Sig Start Date End Date Taking? Authorizing Provider   atorvastatin (LIPITOR) 40 mg tablet Take 1 Tablet by mouth every evening. 22  Yes Provider, Historical   pantoprazole (PROTONIX) 40 mg tablet Take 40 mg by mouth daily. 22  Yes Provider, Historical   mirtazapine (REMERON) 45 mg tablet Take 1 Tablet by mouth nightly.  22  Yes Provider, Historical   montelukast (SINGULAIR) 10 mg tablet TAKE 1 TABLET BY MOUTH DAILY 18  Yes Murray Larry MD   levothyroxine (SYNTHROID) 100 mcg tablet Take 1 tab every day Monday to 17  Yes Murray Larry MD   diclofenac (Voltaren) 1 % gel Apply  to affected area four (4) times daily. 1/31/22   Ann Marie Wallace MD        Allergies   Allergen Reactions    Crestor [Rosuvastatin] Swelling       Review of Systems:  A comprehensive review of systems was negative except for that written in the History of Present Illness. Objective:     Vitals:    03/07/22 0915 03/07/22 1000 03/07/22 1100 03/07/22 1200   BP: (!) 138/96 116/85 126/73 127/82   Pulse: (!) 119 (!) 112 (!) 108 (!) 108   Resp: 14 19 18 16   Temp: 97.9 °F (36.6 °C)      SpO2: 93% 94% 94% 93%   Weight: 54.4 kg (120 lb)      Height: 5' 4\" (1.626 m)           Physical Exam:  General:  Alert, cooperative, no distress, appears stated age. Eyes:  Conjunctivae/corneas clear. PERRL, EOMs intact. Fundi benign   Ears:  Normal TMs and external ear canals both ears. Nose: Nares normal. Septum midline. Mucosa normal. No drainage or sinus tenderness. Mouth/Throat: Lips, mucosa, and tongue normal. Teeth and gums normal.   Neck: Supple, symmetrical, trachea midline, no adenopathy, thyroid: no enlargment/tenderness/nodules, no carotid bruit and no JVD. Back:   Symmetric, no curvature. ROM normal. No CVA tenderness. Lungs:   Clear to auscultation bilaterally. Heart:  Regular rate and rhythm, S1, S2 normal, no murmur, click, rub or gallop. Abdomen:   Soft, non-tender. Bowel sounds normal. No masses,  No organomegaly. Extremities: Extremities normal, atraumatic, no cyanosis or edema. Pulses: 2+ and symmetric all extremities. Skin: Skin color, texture, turgor normal. No rashes or lesions   Lymph nodes: Cervical, supraclavicular, and axillary nodes normal.   Neurologic: CNII-XII intact. Normal strength, sensation and reflexes throughout.      Recent Results (from the past 24 hour(s))   CBC WITH AUTOMATED DIFF    Collection Time: 03/07/22  9:33 AM   Result Value Ref Range    WBC 15.5 (H) 3.6 - 11.0 K/uL    RBC 3.61 (L) 3.80 - 5.20 M/uL    HGB 8.9 (L) 11.5 - 16.0 g/dL    HCT 28.5 (L) 35.0 - 47.0 %    MCV 78.9 (L) 80.0 - 99.0 FL    MCH 24.7 (L) 26.0 - 34.0 PG    MCHC 31.2 30.0 - 36.5 g/dL    RDW 23.3 (H) 11.5 - 14.5 %    PLATELET 770 295 - 062 K/uL    NRBC 0.1 (H) 0.0  WBC    ABSOLUTE NRBC 0.02 (H) 0.00 - 0.01 K/uL    NEUTROPHILS 55 32 - 75 %    LYMPHOCYTES 10 (L) 12 - 49 %    MONOCYTES 2 (L) 5 - 13 %    EOSINOPHILS 33 (H) 0 - 7 %    BASOPHILS 0 0 - 1 %    IMMATURE GRANULOCYTES 0 %    ABS. NEUTROPHILS 8.5 (H) 1.8 - 8.0 K/UL    ABS. LYMPHOCYTES 1.6 0.8 - 3.5 K/UL    ABS. MONOCYTES 0.3 0.0 - 1.0 K/UL    ABS. EOSINOPHILS 5.1 (H) 0.0 - 0.4 K/UL    ABS. BASOPHILS 0.0 0.0 - 0.1 K/UL    ABS. IMM. GRANS. 0.0 K/UL    DF Manual      PLATELET COMMENTS Large Platelets      RBC COMMENTS Anisocytosis  2+        RBC COMMENTS Teardrop cells  2+        RBC COMMENTS Poikilocytosis  3+        RBC COMMENTS Macrocytosis  1+        RBC COMMENTS Microcytosis  2+        RBC COMMENTS Polychromasia  1+        RBC COMMENTS Ovalocytes  2+       METABOLIC PANEL, COMPREHENSIVE    Collection Time: 03/07/22  9:33 AM   Result Value Ref Range    Sodium 134 (L) 136 - 145 mmol/L    Potassium 5.0 3.5 - 5.1 mmol/L    Chloride 104 97 - 108 mmol/L    CO2 20 (L) 21 - 32 mmol/L    Anion gap 10 5 - 15 mmol/L    Glucose 124 (H) 65 - 100 mg/dL    BUN 66 (H) 6 - 20 mg/dL    Creatinine 2.05 (H) 0.55 - 1.02 mg/dL    BUN/Creatinine ratio 32 (H) 12 - 20      GFR est AA 28 (L) >60 ml/min/1.73m2    GFR est non-AA 23 (L) >60 ml/min/1.73m2    Calcium 9.0 8.5 - 10.1 mg/dL    Bilirubin, total 1.0 0.2 - 1.0 mg/dL    AST (SGOT) 35 15 - 37 U/L    ALT (SGPT) 16 12 - 78 U/L    Alk.  phosphatase 124 (H) 45 - 117 U/L    Protein, total 8.7 (H) 6.4 - 8.2 g/dL    Albumin 2.6 (L) 3.5 - 5.0 g/dL    Globulin 6.1 (H) 2.0 - 4.0 g/dL    A-G Ratio 0.4 (L) 1.1 - 2.2     URINALYSIS W/ REFLEX CULTURE    Collection Time: 03/07/22  9:33 AM    Specimen: Urine   Result Value Ref Range    Color Yellow/Straw      Appearance Clear Clear      Specific gravity 1.016 1.003 - 1.030      pH (UA) 5.0 5.0 - 8.0      Protein 30 (A) Negative mg/dL    Glucose Negative Negative mg/dL    Ketone Negative Negative mg/dL    Bilirubin Negative Negative      Blood Small (A) Negative      Urobilinogen 0.1 0.1 - 1.0 EU/dL    Nitrites Negative Negative      Leukocyte Esterase Negative Negative      UA:UC IF INDICATED Culture not indicated by UA result Culture not indicated by UA result      WBC 0-4 0 - 4 /hpf    RBC 10-20 0 - 5 /hpf    Bacteria Negative Negative /hpf    Mucus Trace /lpf   MAGNESIUM    Collection Time: 03/07/22  9:33 AM   Result Value Ref Range    Magnesium 2.5 (H) 1.6 - 2.4 mg/dL   TROPONIN-HIGH SENSITIVITY    Collection Time: 03/07/22  9:33 AM   Result Value Ref Range    Troponin-High Sensitivity 17 0 - 51 ng/L   COVID-19 RAPID TEST    Collection Time: 03/07/22 11:48 AM   Result Value Ref Range    COVID-19 rapid test Not Detected Not Detected         Assessment:     Hospital Problems  Date Reviewed: 2/2/2018          Codes Class Noted POA    Dehydration ICD-10-CM: E86.0  ICD-9-CM: 276.51  3/7/2022 Unknown        JORDANA (acute kidney injury) ([facility] [address] ICD-10-CM: N17.9  ICD-9-CM: 584.9  3/7/2022 Unknown          Volume depletion, asthma exacerbation,    Plan:     Discontinue ARB and thiazide hydrate gently monitor electrolytes consult nephrology    Signed By: Steve Urbina MD     March 7, 2022

## 2022-03-07 NOTE — PROGRESS NOTES
Reason for Admission: JORDANA/Dehydration                      RUR Score:   18%                 Plan for utilizing home health:  None @ this time/uses cane @ times. PCP: First and Last name:  Gregg Mendez MD     Name of Practice:    Are you a current patient: Yes/No: Yes   Approximate date of last visit: Seen a month ago. Can you participate in a virtual visit with your PCP: Yes/Call                    Current Advanced Directive/Advance Care Plan: Prior      Healthcare Decision Maker:             Primary Decision Maker: Jose Estes - Child - [phone]                  Transition of Care Plan:  D/C Plan is home with family & a family member to transport pt home upon discharge.

## 2022-03-07 NOTE — ED TRIAGE NOTES
Family felt concerned about pt because poor appetite for the past few days. ( pt states that she is not hungry). Also stated that urine very dark this morning. . concerned about dehydration.  Pt's only complaint is right knee pain when she stands up

## 2022-03-08 LAB
ALBUMIN SERPL-MCNC: 2.3 G/DL (ref 3.5–5)
ALBUMIN/GLOB SERPL: 0.5 {RATIO} (ref 1.1–2.2)
ALP SERPL-CCNC: 113 U/L (ref 45–117)
ALT SERPL-CCNC: 12 U/L (ref 12–78)
ANION GAP SERPL CALC-SCNC: 8 MMOL/L (ref 5–15)
AST SERPL W P-5'-P-CCNC: 22 U/L (ref 15–37)
ATRIAL RATE: 114 BPM
BILIRUB SERPL-MCNC: 0.8 MG/DL (ref 0.2–1)
BUN SERPL-MCNC: 55 MG/DL (ref 6–20)
BUN/CREAT SERPL: 30 (ref 12–20)
CA-I BLD-MCNC: 8.6 MG/DL (ref 8.5–10.1)
CALCULATED P AXIS, ECG09: 61 DEGREES
CALCULATED R AXIS, ECG10: 32 DEGREES
CALCULATED T AXIS, ECG11: 76 DEGREES
CHLORIDE SERPL-SCNC: 108 MMOL/L (ref 97–108)
CO2 SERPL-SCNC: 21 MMOL/L (ref 21–32)
CREAT SERPL-MCNC: 1.83 MG/DL (ref 0.55–1.02)
DIAGNOSIS, 93000: NORMAL
GLOBULIN SER CALC-MCNC: 5.1 G/DL (ref 2–4)
GLUCOSE BLD STRIP.AUTO-MCNC: 157 MG/DL (ref 65–117)
GLUCOSE SERPL-MCNC: 137 MG/DL (ref 65–100)
P-R INTERVAL, ECG05: 152 MS
PERFORMED BY, TECHID: ABNORMAL
POTASSIUM SERPL-SCNC: 4.6 MMOL/L (ref 3.5–5.1)
PROT SERPL-MCNC: 7.4 G/DL (ref 6.4–8.2)
Q-T INTERVAL, ECG07: 322 MS
QRS DURATION, ECG06: 72 MS
QTC CALCULATION (BEZET), ECG08: 443 MS
SODIUM SERPL-SCNC: 137 MMOL/L (ref 136–145)
VENTRICULAR RATE, ECG03: 114 BPM

## 2022-03-08 PROCEDURE — 74011250637 HC RX REV CODE- 250/637: Performed by: INTERNAL MEDICINE

## 2022-03-08 PROCEDURE — 74011250636 HC RX REV CODE- 250/636: Performed by: INTERNAL MEDICINE

## 2022-03-08 PROCEDURE — 74011000250 HC RX REV CODE- 250: Performed by: INTERNAL MEDICINE

## 2022-03-08 PROCEDURE — 36415 COLL VENOUS BLD VENIPUNCTURE: CPT

## 2022-03-08 PROCEDURE — 82962 GLUCOSE BLOOD TEST: CPT

## 2022-03-08 PROCEDURE — 80053 COMPREHEN METABOLIC PANEL: CPT

## 2022-03-08 PROCEDURE — 65270000029 HC RM PRIVATE

## 2022-03-08 PROCEDURE — 74011636637 HC RX REV CODE- 636/637: Performed by: INTERNAL MEDICINE

## 2022-03-08 PROCEDURE — 74011000258 HC RX REV CODE- 258: Performed by: INTERNAL MEDICINE

## 2022-03-08 PROCEDURE — 92610 EVALUATE SWALLOWING FUNCTION: CPT

## 2022-03-08 RX ADMIN — MIRTAZAPINE 45 MG: 30 TABLET, FILM COATED ORAL at 19:57

## 2022-03-08 RX ADMIN — PREDNISONE 20 MG: 20 TABLET ORAL at 08:29

## 2022-03-08 RX ADMIN — ATORVASTATIN CALCIUM 40 MG: 40 TABLET, FILM COATED ORAL at 17:41

## 2022-03-08 RX ADMIN — PIPERACILLIN SODIUM AND TAZOBACTAM SODIUM 3.38 G: 3; .375 INJECTION, POWDER, LYOPHILIZED, FOR SOLUTION INTRAVENOUS at 22:52

## 2022-03-08 RX ADMIN — PANTOPRAZOLE SODIUM 40 MG: 40 TABLET, DELAYED RELEASE ORAL at 08:28

## 2022-03-08 RX ADMIN — MONTELUKAST 10 MG: 10 TABLET, FILM COATED ORAL at 08:29

## 2022-03-08 RX ADMIN — DICLOFENAC 2 G: 10 GEL TOPICAL at 02:36

## 2022-03-08 RX ADMIN — SODIUM CHLORIDE, PRESERVATIVE FREE 10 ML: 5 INJECTION INTRAVENOUS at 19:57

## 2022-03-08 RX ADMIN — HEPARIN SODIUM 5000 UNITS: 5000 INJECTION INTRAVENOUS; SUBCUTANEOUS at 19:56

## 2022-03-08 RX ADMIN — DICLOFENAC 2 G: 10 GEL TOPICAL at 17:42

## 2022-03-08 RX ADMIN — DICLOFENAC 2 G: 10 GEL TOPICAL at 08:29

## 2022-03-08 RX ADMIN — HEPARIN SODIUM 5000 UNITS: 5000 INJECTION INTRAVENOUS; SUBCUTANEOUS at 08:29

## 2022-03-08 RX ADMIN — PIPERACILLIN SODIUM AND TAZOBACTAM SODIUM 3.38 G: 3; .375 INJECTION, POWDER, LYOPHILIZED, FOR SOLUTION INTRAVENOUS at 11:49

## 2022-03-08 RX ADMIN — DICLOFENAC 2 G: 10 GEL TOPICAL at 13:05

## 2022-03-08 RX ADMIN — LEVOTHYROXINE SODIUM 100 MCG: 0.1 TABLET ORAL at 06:10

## 2022-03-08 RX ADMIN — SODIUM CHLORIDE, PRESERVATIVE FREE 10 ML: 5 INJECTION INTRAVENOUS at 13:06

## 2022-03-08 RX ADMIN — SODIUM CHLORIDE, PRESERVATIVE FREE 10 ML: 5 INJECTION INTRAVENOUS at 06:11

## 2022-03-08 RX ADMIN — SODIUM BICARBONATE: 84 INJECTION, SOLUTION INTRAVENOUS at 13:05

## 2022-03-08 RX ADMIN — DICLOFENAC 2 G: 10 GEL TOPICAL at 22:00

## 2022-03-08 NOTE — PROGRESS NOTES
Progress Note    Patient: Lul Diana MRN: [id number]  SSN: [id number]    YOB: 1933  Age: 80 y.o. Sex: female      Admit Date: 3/7/2022    LOS: 1 day     Subjective:     80years old admitted for suspicion of asthma possible aspiration pneumonia feels better today    Objective:     Vitals:    03/07/22 1200 03/07/22 2118 03/07/22 2123 03/08/22 0716   BP: 127/82 128/78  99/63   Pulse: (!) 108 (!) 120  (!) 120   Resp: 16 20 20   Temp:  98.2 °F (36.8 °C)  98.7 °F (37.1 °C)   SpO2: 93% 94% 94% 95%   Weight:       Height:            Intake and Output:  Current Shift: No intake/output data recorded. Last three shifts: No intake/output data recorded. Physical Exam:   General:  Alert, cooperative, no distress, appears stated age. Eyes:  Conjunctivae/corneas clear. PERRL, EOMs intact. Fundi benign   Ears:  Normal TMs and external ear canals both ears. Nose: Nares normal. Septum midline. Mucosa normal. No drainage or sinus tenderness. Mouth/Throat: Lips, mucosa, and tongue normal. Teeth and gums normal.   Neck: Supple, symmetrical, trachea midline, no adenopathy, thyroid: no enlargment/tenderness/nodules, no carotid bruit and no JVD. Back:   Symmetric, no curvature. ROM normal. No CVA tenderness. Lungs:   ronchi to auscultation bilaterally. Heart:  Regular rate and rhythm, S1, S2 normal, no murmur, click, rub or gallop. Abdomen:   Soft, non-tender. Bowel sounds normal. No masses,  No organomegaly. Extremities: Extremities normal, atraumatic, no cyanosis or edema. Pulses: 2+ and symmetric all extremities. Skin: Skin color, texture, turgor normal. No rashes or lesions   Lymph nodes: Cervical, supraclavicular, and axillary nodes normal.   Neurologic: CNII-XII intact. Normal strength, sensation and reflexes throughout. Lab/Data Review: All lab results for the last 24 hours reviewed.      Recent Results (from the past 24 hour(s))   GLUCOSE, POC    Collection Time: 03/08/22 7:20 AM   Result Value Ref Range    Glucose (POC) 157 (H) 65 - 117 mg/dL    Performed by Watts Mantle    METABOLIC PANEL, COMPREHENSIVE    Collection Time: 03/08/22  7:35 AM   Result Value Ref Range    Sodium 137 136 - 145 mmol/L    Potassium 4.6 3.5 - 5.1 mmol/L    Chloride 108 97 - 108 mmol/L    CO2 21 21 - 32 mmol/L    Anion gap 8 5 - 15 mmol/L    Glucose 137 (H) 65 - 100 mg/dL    BUN 55 (H) 6 - 20 mg/dL    Creatinine 1.83 (H) 0.55 - 1.02 mg/dL    BUN/Creatinine ratio 30 (H) 12 - 20      GFR est AA 32 (L) >60 ml/min/1.73m2    GFR est non-AA 26 (L) >60 ml/min/1.73m2    Calcium 8.6 8.5 - 10.1 mg/dL    Bilirubin, total 0.8 0.2 - 1.0 mg/dL    AST (SGOT) 22 15 - 37 U/L    ALT (SGPT) 12 12 - 78 U/L    Alk.  phosphatase 113 45 - 117 U/L    Protein, total 7.4 6.4 - 8.2 g/dL    Albumin 2.3 (L) 3.5 - 5.0 g/dL    Globulin 5.1 (H) 2.0 - 4.0 g/dL    A-G Ratio 0.5 (L) 1.1 - 2.2           Assessment:     Active Problems:    Dehydration (3/7/2022)      [facility] (3/7/2022)    exer of asthma    Plan:     Continue gentle hydration    Signed By: Jovon Blake MD     March 8, 2022

## 2022-03-08 NOTE — PROGRESS NOTES
SPEECH LANGUAGE PATHOLOGY BEDSIDE SWALLOW EVALUATIONS  Patient: Lali Zuniga  (4 y.o. )  Date: 3/8/2022  Primary Diagnosis: JORDANA   Precautions:      ASSESSMENT :  Patient is A&Ox4 and follows basic commands. Memory deficits noted informally. Patient presents w/ largely wfl oropharyngeal swallow fxn. Oral phase c/b minimal reduction in mastication s/t edentulous. Pharyngeal phase c/b timely swallow initiation and HLE is wfl upon palpation. No overt s/s of pen/asp observed. Patient appears at baseline. Rec appetite stimulant as medically appropriate. PLAN :  Recommendations and Planned Interventions:  Rec cont regular/thin diet w/ strict asp/GERD precautions. Rec RD consult for nutritional support. Patient requesting ensures. SUBJECTIVE:   Patient denies dysphagia reports poor appetite. OBJECTIVE:   Patient admitted w/ decreased PO intake and JORDANA. Past Medical History:   Diagnosis Date    Asthma     Bereavement 3/30/16    63 yo con  - multiple myleoma     Cholelithiasis     Cough     Dyslipidemia     Hip pain, chronic, left 2017    Hypertension     Low back pain 16    Osteoarthritis     Osteoporosis 16    Prediabetes     S/P colonoscopy 7/15    [facility]    Trigger finger, right 2017    Wedge compression fx of second thoracic vertebra with routine healing 16    t11       CXR Results  (Last 48 hours)               22 1011  XR CHEST PORT Final result    Impression:  No acute pulmonary process. Narrative:  Chest, frontal view, 3/7/2022       History: Cough. Fatigue. Comparison: Including chest 2020. Findings: The cardiac silhouette is within normal limits. The lungs are   adequately expanded. No hydrostatic edema is present. No focal consolidation,   pleural effusions or pneumothorax is identified. Blunting of the right   costophrenic angles again noted. Degenerative changes are present in the   shoulder. Diet prior to admission: Regular  Current Diet:  DIET ADULT     Cognitive and Communication Status:  Neurologic State: Alert  Orientation Level: Oriented X4  Cognition: Follows commands  Perception: Appears intact  Perseveration: No perseveration noted  Safety/Judgement: Awareness of environment  Swallowing Evaluation:   Oral Assessment:  Oral Assessment  Labial: No impairment  Dentition: Edentulous  Oral Hygiene: wfl  Lingual: No impairment  Velum: No impairment  Mandible: No impairment  P.O. Trials:  Patient Position: upright in bed  Vocal quality prior to P.O.: No impairment  Consistency Presented: Puree; Solid; Thin liquid  How Presented: Self-fed/presented;Cup/sip;Spoon;Straw;Successive swallows     Bolus Acceptance: No impairment  Bolus Formation/Control: Impaired  Type of Impairment: Mastication  Propulsion: Delayed (# of seconds)  Oral Residue: None  Initiation of Swallow: No impairment  Laryngeal Elevation: Functional  Aspiration Signs/Symptoms: None  Pharyngeal Phase Characteristics: No impairment, issues, or problems              Oral Phase Severity: Minimal  Pharyngeal Phase Severity : Minimal  Voice:   Vocal Quality: No impairment    Pain:  0    After treatment:   Patient left in no apparent distress in bed, Call bell within reach and Nursing notified    COMMUNICATION/EDUCATION:   Patient was educated regarding diet recs, s/s aspiration and aspiration precautions. She demonstrated Fair understanding as evidenced by engagement w/ mild confusion.       Thank you for this referral.  Chio Talley M.S., M.Ed., CCC-SLP  Time Calculation: 16 mins

## 2022-03-08 NOTE — CONSULTS
Consult Date: 3/8/2022    IP CONSULT TO NEPHROLOGY  Consult performed by: Raphael Dillard MD  Consult ordered by: Priscila De La Rosa MD          Subjective   HISTORY OF PRESENTING ILLNESS     Patient is a 27-year-old -American female with a history of hypertension, osteoporosis, asthma admitted to the hospital with increasing generalized weakness, abdominal pain fatigue, loss of appetite and dark-colored urine. CT scan of abdomen pelvis was done which showed cholelithiasis but evidence of cholecystitis. She was rehydrated with IV fluids. Renal consult is placed for worsened renal function. She is not aware of history of kidney disease in the past.    Past Medical History:   Diagnosis Date    Asthma     Bereavement 3/30/16    65 yo con  - multiple myleoma     Cholelithiasis     Cough     Dyslipidemia     Hip pain, chronic, left 2017    Hypertension     Low back pain 16    Osteoarthritis     Osteoporosis 16    Prediabetes     S/P colonoscopy 7/15    [facility]    Trigger finger, right 2017    Wedge compression fx of second thoracic vertebra with routine healing 16    t11      Past Surgical History:   Procedure Laterality Date    HX COLONOSCOPY      HX GYN       Family History   Family history unknown: Yes   Problem Relation Age of Onset    Family history unknown:  Yes    Cancer Son       Social History     Tobacco Use    Smoking status: Never Smoker    Smokeless tobacco: Never Used   Substance Use Topics    Alcohol use: Not Currently       Current Facility-Administered Medications   Medication Dose Route Frequency Provider Last Rate Last Admin    piperacillin-tazobactam (ZOSYN) 3.375 g in 0.9% sodium chloride (MBP/ADV) 100 mL MBP  3.375 g IntraVENous Q12H Ema RIVERA MD 25 mL/hr at 22 1149 3.375 g at 22 1149    sodium bicarbonate (8.4%) 150 mEq in dextrose 5% 1,000 mL infusion   IntraVENous CONTINUOUS Joao Miranda MD 84 mL/hr at 03/08/22 1305 New Bag at 03/08/22 1305    atorvastatin (LIPITOR) tablet 40 mg  40 mg Oral QPM Tavares RIVERA MD        diclofenac (VOLTAREN) 1 % topical gel 2 g  2 g Topical QID Tavares RIVERA MD   2 g at 03/08/22 1305    levothyroxine (SYNTHROID) tablet 100 mcg  100 mcg Oral Waqar RIVERA MD   100 mcg at 03/08/22 0610    mirtazapine (REMERON) tablet 45 mg  45 mg Oral QHS Tavares RIVERA MD   45 mg at 03/07/22 2059    montelukast (SINGULAIR) tablet 10 mg  10 mg Oral DAILY Tavares RIVERA MD   10 mg at 03/08/22 0829    pantoprazole (PROTONIX) tablet 40 mg  40 mg Oral DAILY Ricky Hunt MD   40 mg at 03/08/22 5853    sodium chloride (NS) flush 5-40 mL  5-40 mL IntraVENous Q8H Ricky Hunt MD   10 mL at 03/08/22 1306    sodium chloride (NS) flush 5-40 mL  5-40 mL IntraVENous PRN Ricky Hunt MD        heparin (porcine) injection 5,000 Units  5,000 Units SubCUTAneous Q12H Ricky Hunt MD   5,000 Units at 03/08/22 0829    fluticasone-umeclidin-vilanter (TRELEGY ELLIPTA) inhaler 1 Puff  1 Puff Inhalation DAILY Tavares RIVERA MD        predniSONE (DELTASONE) tablet 20 mg  20 mg Oral DAILY WITH BREAKFAST Ricky Hunt MD   20 mg at 03/08/22 2987    benzonatate (TESSALON) capsule 100 mg  100 mg Oral TID PRN Tavares RIVERA MD        albuterol CONCENTRATE 2.5mg/0.5 mL neb soln  2.5 mg Nebulization Q4H PRN Ricky Hunt MD            Review of Systems   Constitutional: Positive for activity change, appetite change and fatigue. Negative for fever and unexpected weight change. HENT: Negative for congestion, facial swelling, postnasal drip, sinus pressure and trouble swallowing. Eyes: Negative for discharge, redness and visual disturbance. Respiratory: Negative for cough, chest tightness, shortness of breath and wheezing. Cardiovascular: Negative for chest pain, palpitations and leg swelling. Gastrointestinal: Positive for abdominal pain.  Negative for abdominal distention, constipation, diarrhea, nausea and vomiting. Endocrine: Negative for cold intolerance, heat intolerance and polyuria. Genitourinary: Positive for decreased urine volume and difficulty urinating. Negative for hematuria. Musculoskeletal: Negative for arthralgias, back pain, gait problem, joint swelling and myalgias. Skin: Negative for color change and pallor. Allergic/Immunologic: Negative for environmental allergies and food allergies. Neurological: Negative for dizziness, tremors, seizures, facial asymmetry and headaches. Hematological: Negative for adenopathy. Does not bruise/bleed easily. Psychiatric/Behavioral: Negative for agitation, behavioral problems and confusion. Objective     Vital signs for last 24 hours:  Visit Vitals  /70 (BP 1 Location: Left upper arm, BP Patient Position: At rest)   Pulse (!) 104   Temp 97.4 °F (36.3 °C)   Resp 20   Ht 5' 4\" (1.626 m)   Wt 54.4 kg (120 lb)   SpO2 100%   BMI 20.60 kg/m²           Recent Results (from the past 24 hour(s))   GLUCOSE, POC    Collection Time: 03/08/22  7:20 AM   Result Value Ref Range    Glucose (POC) 157 (H) 65 - 117 mg/dL    Performed by Bailey Schmid    METABOLIC PANEL, COMPREHENSIVE    Collection Time: 03/08/22  7:35 AM   Result Value Ref Range    Sodium 137 136 - 145 mmol/L    Potassium 4.6 3.5 - 5.1 mmol/L    Chloride 108 97 - 108 mmol/L    CO2 21 21 - 32 mmol/L    Anion gap 8 5 - 15 mmol/L    Glucose 137 (H) 65 - 100 mg/dL    BUN 55 (H) 6 - 20 mg/dL    Creatinine 1.83 (H) 0.55 - 1.02 mg/dL    BUN/Creatinine ratio 30 (H) 12 - 20      GFR est AA 32 (L) >60 ml/min/1.73m2    GFR est non-AA 26 (L) >60 ml/min/1.73m2    Calcium 8.6 8.5 - 10.1 mg/dL    Bilirubin, total 0.8 0.2 - 1.0 mg/dL    AST (SGOT) 22 15 - 37 U/L    ALT (SGPT) 12 12 - 78 U/L    Alk.  phosphatase 113 45 - 117 U/L    Protein, total 7.4 6.4 - 8.2 g/dL    Albumin 2.3 (L) 3.5 - 5.0 g/dL    Globulin 5.1 (H) 2.0 - 4.0 g/dL    A-G Ratio 0.5 (L) 1.1 - 2.2          No intake or output data in the 24 hours ending 03/08/22 5999   Current Shift: No intake/output data recorded. Last 3 Shifts: No intake/output data recorded. Physical Exam  Vitals and nursing note reviewed. Constitutional:       Appearance: Normal appearance. She is normal weight. HENT:      Head: Normocephalic and atraumatic. Nose: Nose normal.      Mouth/Throat:      Mouth: Mucous membranes are moist.      Pharynx: Oropharynx is clear. Eyes:      Conjunctiva/sclera: Conjunctivae normal.   Cardiovascular:      Rate and Rhythm: Regular rhythm. Tachycardia present. Pulses: Normal pulses. Heart sounds: Normal heart sounds. Pulmonary:      Effort: Pulmonary effort is normal.      Breath sounds: Normal breath sounds. Abdominal:      General: Abdomen is flat. Palpations: Abdomen is soft. Skin:     General: Skin is warm and dry. Coloration: Skin is not pale. Findings: No erythema. Neurological:      General: No focal deficit present. Mental Status: She is alert and oriented to person, place, and time.    Psychiatric:         Mood and Affect: Mood normal.         Behavior: Behavior normal.          Data Review:   Recent Results (from the past 24 hour(s))   GLUCOSE, POC    Collection Time: 03/08/22  7:20 AM   Result Value Ref Range    Glucose (POC) 157 (H) 65 - 117 mg/dL    Performed by [facility]    METABOLIC PANEL, COMPREHENSIVE    Collection Time: 03/08/22  7:35 AM   Result Value Ref Range    Sodium 137 136 - 145 mmol/L    Potassium 4.6 3.5 - 5.1 mmol/L    Chloride 108 97 - 108 mmol/L    CO2 21 21 - 32 mmol/L    Anion gap 8 5 - 15 mmol/L    Glucose 137 (H) 65 - 100 mg/dL    BUN 55 (H) 6 - 20 mg/dL    Creatinine 1.83 (H) 0.55 - 1.02 mg/dL    BUN/Creatinine ratio 30 (H) 12 - 20      GFR est AA 32 (L) >60 ml/min/1.73m2    GFR est non-AA 26 (L) >60 ml/min/1.73m2    Calcium 8.6 8.5 - 10.1 mg/dL    Bilirubin, total 0.8 0.2 - 1.0 mg/dL    AST (SGOT) 22 15 - 37 U/L    ALT (SGPT) 12 12 - 78 U/L    Alk. phosphatase 113 45 - 117 U/L    Protein, total 7.4 6.4 - 8.2 g/dL    Albumin 2.3 (L) 3.5 - 5.0 g/dL    Globulin 5.1 (H) 2.0 - 4.0 g/dL    A-G Ratio 0.5 (L) 1.1 - 2.2           US RETROPERITONEUM COMP   Final Result   Medical renal disease      CT ABD PELV WO CONT   Final Result   1. Cholelithiasis, but no CT evidence of acute cholecystitis. 2. Small bilateral pleural effusions with basilar atelectasis. 3. Atherosclerotic disease. 4. Nonobstructing renal stones. No ureteral or bladder stone. No hydronephrosis. 5. Please see above report for further details. XR CHEST PORT   Final Result   No acute pulmonary process. Patient Active Problem List   Diagnosis Code    Hypertension I10    Asthma J45.909    S/P colonoscopy Z98.890    Low back pain M54.50    ACP (advance care planning) Z71.89    Osteoporosis M81.0    Cough R05.9    Cholelithiasis K80.20    Dyslipidemia E78.5    Prediabetes R73.03    Osteoarthritis M19.90    Trigger finger, right M65.30    Hip pain, chronic, left M25.552, G89.29    Dehydration E86.0    JORDANA (acute kidney injury) ([facility] N17.9        DIAGNOSES:  1. 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 CKD baseline not known  3. History of hypertension  4. Chronic anemia  5. Sepsis/leukocytosissource not clear  6. Hypomagnesemiamild  7. Metabolic acidosis, mild  8. Hyperkalemia, mild  DISCUSSION:   Presentation is congestive of JORDANA prerenal or sepsis associated   Renal ultrasound findings of 8.1 cm kidney/8.6 cm kidney suggest chronicity.  Most likely cause of the fall/weakness is hypertensive nephrosclerosis but could also be chronic NSAID use    PLAN:   Would agree with IV hydration   Short course of bicarbonate to correct acidosis   Avoid magnesium based   Anemia is out of proportion and will need work-up        Thanks for consulting me.  Please don't hesitate to contact me if any questions arise of if I can assist in any manner. This dictation was done by dragon, computer voice recognition software. Often unanticipated grammatical, syntax, phones and other interpretive errors are inadvertently transcribed. Please excuse errors that have escaped final proofreading. Please contact me if you suspect dictation or transcription errors.   Dr Betsy Campos  [address], [address] [facility], [address] [facility]  Cell Phone: [phone]  Office phone: [phone]  Fax: [phone]

## 2022-03-09 LAB
ALBUMIN SERPL-MCNC: 2.1 G/DL (ref 3.5–5)
ALBUMIN/GLOB SERPL: 0.5 {RATIO} (ref 1.1–2.2)
ALP SERPL-CCNC: 97 U/L (ref 45–117)
ALT SERPL-CCNC: 15 U/L (ref 12–78)
ANION GAP SERPL CALC-SCNC: 6 MMOL/L (ref 5–15)
AST SERPL W P-5'-P-CCNC: 23 U/L (ref 15–37)
BILIRUB SERPL-MCNC: 0.6 MG/DL (ref 0.2–1)
BUN SERPL-MCNC: 50 MG/DL (ref 6–20)
BUN/CREAT SERPL: 29 (ref 12–20)
CA-I BLD-MCNC: 7.6 MG/DL (ref 8.5–10.1)
CHLORIDE SERPL-SCNC: 102 MMOL/L (ref 97–108)
CO2 SERPL-SCNC: 28 MMOL/L (ref 21–32)
CREAT SERPL-MCNC: 1.73 MG/DL (ref 0.55–1.02)
GLOBULIN SER CALC-MCNC: 4.4 G/DL (ref 2–4)
GLUCOSE SERPL-MCNC: 137 MG/DL (ref 65–100)
POTASSIUM SERPL-SCNC: 4 MMOL/L (ref 3.5–5.1)
PROT SERPL-MCNC: 6.5 G/DL (ref 6.4–8.2)
SODIUM SERPL-SCNC: 136 MMOL/L (ref 136–145)
TSH SERPL DL<=0.05 MIU/L-ACNC: 0.36 UIU/ML (ref 0.36–3.74)

## 2022-03-09 PROCEDURE — 83550 IRON BINDING TEST: CPT

## 2022-03-09 PROCEDURE — 74011250636 HC RX REV CODE- 250/636: Performed by: INTERNAL MEDICINE

## 2022-03-09 PROCEDURE — 82728 ASSAY OF FERRITIN: CPT

## 2022-03-09 PROCEDURE — 97165 OT EVAL LOW COMPLEX 30 MIN: CPT

## 2022-03-09 PROCEDURE — 83540 ASSAY OF IRON: CPT

## 2022-03-09 PROCEDURE — 82607 VITAMIN B-12: CPT

## 2022-03-09 PROCEDURE — 74011250637 HC RX REV CODE- 250/637: Performed by: INTERNAL MEDICINE

## 2022-03-09 PROCEDURE — 97530 THERAPEUTIC ACTIVITIES: CPT

## 2022-03-09 PROCEDURE — 65270000029 HC RM PRIVATE

## 2022-03-09 PROCEDURE — 74011000258 HC RX REV CODE- 258: Performed by: INTERNAL MEDICINE

## 2022-03-09 PROCEDURE — 36415 COLL VENOUS BLD VENIPUNCTURE: CPT

## 2022-03-09 PROCEDURE — 74011000250 HC RX REV CODE- 250: Performed by: INTERNAL MEDICINE

## 2022-03-09 PROCEDURE — 97161 PT EVAL LOW COMPLEX 20 MIN: CPT

## 2022-03-09 PROCEDURE — 94760 N-INVAS EAR/PLS OXIMETRY 1: CPT

## 2022-03-09 PROCEDURE — 84443 ASSAY THYROID STIM HORMONE: CPT

## 2022-03-09 PROCEDURE — 94640 AIRWAY INHALATION TREATMENT: CPT

## 2022-03-09 PROCEDURE — 74011636637 HC RX REV CODE- 636/637: Performed by: INTERNAL MEDICINE

## 2022-03-09 PROCEDURE — 80053 COMPREHEN METABOLIC PANEL: CPT

## 2022-03-09 RX ORDER — SODIUM CHLORIDE 9 MG/ML
50 INJECTION, SOLUTION INTRAVENOUS CONTINUOUS
Status: DISCONTINUED | OUTPATIENT
Start: 2022-03-09 | End: 2022-03-10

## 2022-03-09 RX ADMIN — HEPARIN SODIUM 5000 UNITS: 5000 INJECTION INTRAVENOUS; SUBCUTANEOUS at 08:50

## 2022-03-09 RX ADMIN — PREDNISONE 20 MG: 20 TABLET ORAL at 08:50

## 2022-03-09 RX ADMIN — MONTELUKAST 10 MG: 10 TABLET, FILM COATED ORAL at 08:50

## 2022-03-09 RX ADMIN — PIPERACILLIN SODIUM AND TAZOBACTAM SODIUM 3.38 G: 3; .375 INJECTION, POWDER, LYOPHILIZED, FOR SOLUTION INTRAVENOUS at 22:01

## 2022-03-09 RX ADMIN — SODIUM CHLORIDE 50 ML/HR: 9 INJECTION, SOLUTION INTRAVENOUS at 20:49

## 2022-03-09 RX ADMIN — FLUTICASONE FUROATE, UMECLIDINIUM BROMIDE AND VILANTEROL TRIFENATATE 1 PUFF: 200; 62.5; 25 POWDER RESPIRATORY (INHALATION) at 08:15

## 2022-03-09 RX ADMIN — ATORVASTATIN CALCIUM 40 MG: 40 TABLET, FILM COATED ORAL at 17:04

## 2022-03-09 RX ADMIN — MIRTAZAPINE 45 MG: 30 TABLET, FILM COATED ORAL at 20:46

## 2022-03-09 RX ADMIN — LEVOTHYROXINE SODIUM 100 MCG: 0.1 TABLET ORAL at 05:08

## 2022-03-09 RX ADMIN — PANTOPRAZOLE SODIUM 40 MG: 40 TABLET, DELAYED RELEASE ORAL at 08:50

## 2022-03-09 RX ADMIN — SODIUM CHLORIDE, PRESERVATIVE FREE 10 ML: 5 INJECTION INTRAVENOUS at 05:01

## 2022-03-09 RX ADMIN — DICLOFENAC 2 G: 10 GEL TOPICAL at 08:50

## 2022-03-09 RX ADMIN — HEPARIN SODIUM 5000 UNITS: 5000 INJECTION INTRAVENOUS; SUBCUTANEOUS at 20:41

## 2022-03-09 RX ADMIN — DICLOFENAC 2 G: 10 GEL TOPICAL at 20:48

## 2022-03-09 RX ADMIN — PIPERACILLIN SODIUM AND TAZOBACTAM SODIUM 3.38 G: 3; .375 INJECTION, POWDER, LYOPHILIZED, FOR SOLUTION INTRAVENOUS at 08:52

## 2022-03-09 RX ADMIN — DICLOFENAC 2 G: 10 GEL TOPICAL at 12:51

## 2022-03-09 RX ADMIN — DICLOFENAC 2 G: 10 GEL TOPICAL at 17:04

## 2022-03-09 NOTE — PROGRESS NOTES
Consult Date: 3/9/2022    Subjective   HISTORY OF PRESENTING ILLNESS     Patient is a 41-year-old -American female with a history of hypertension, osteoporosis, asthma admitted to the hospital with increasing generalized weakness, abdominal pain fatigue, loss of appetite and dark-colored urine. CT scan of abdomen pelvis was done which showed cholelithiasis but evidence of cholecystitis. She was rehydrated with IV fluids. Renal consult is placed for worsened renal function. She is not aware of history of kidney disease in the past.    Past Medical History:   Diagnosis Date    Asthma     Bereavement 3/30/16    63 yo con  - multiple myleoma     Cholelithiasis     Cough     Dyslipidemia     Hip pain, chronic, left 2017    Hypertension     Low back pain 16    Osteoarthritis     Osteoporosis 16    Prediabetes     S/P colonoscopy 7/15    vcu    Trigger finger, right 2017    Wedge compression fx of second thoracic vertebra with routine healing 16    t11      Past Surgical History:   Procedure Laterality Date    HX COLONOSCOPY      HX GYN       Family History   Family history unknown: Yes   Problem Relation Age of Onset    Family history unknown:  Yes    Cancer Son       Social History     Tobacco Use    Smoking status: Never Smoker    Smokeless tobacco: Never Used   Substance Use Topics    Alcohol use: Not Currently       Current Facility-Administered Medications   Medication Dose Route Frequency Provider Last Rate Last Admin    piperacillin-tazobactam (ZOSYN) 3.375 g in 0.9% sodium chloride (MBP/ADV) 100 mL MBP  3.375 g IntraVENous Q12H Almita RIVERA MD 25 mL/hr at 22 0852 3.375 g at 22 0852    sodium bicarbonate (8.4%) 150 mEq in dextrose 5% 1,000 mL infusion   IntraVENous CONTINUOUS Murtaza Thomas MD 84 mL/hr at 22 1305 New Bag at 22 1305    atorvastatin (LIPITOR) tablet 40 mg  40 mg Oral QPM Almita RIVERA MD   40 mg at 03/08/22 1741    diclofenac (VOLTAREN) 1 % topical gel 2 g  2 g Topical QID Master RIVERA MD   2 g at 03/09/22 1251    levothyroxine (SYNTHROID) tablet 100 mcg  100 mcg Oral Herbert RIVERA MD   100 mcg at 03/09/22 0508    mirtazapine (REMERON) tablet 45 mg  45 mg Oral QHS Master RIVERA MD   45 mg at 03/08/22 1957    montelukast (SINGULAIR) tablet 10 mg  10 mg Oral DAILY Master RIVERA MD   10 mg at 03/09/22 0850    pantoprazole (PROTONIX) tablet 40 mg  40 mg Oral DAILY Andres Cárdenas MD   40 mg at 03/09/22 0850    sodium chloride (NS) flush 5-40 mL  5-40 mL IntraVENous PRN Master RIVERA MD        heparin (porcine) injection 5,000 Units  5,000 Units SubCUTAneous Q12H Andres Cárdenas MD   5,000 Units at 03/09/22 0850    fluticasone-umeclidin-vilanter (TRELEGY ELLIPTA) inhaler 1 Puff  1 Puff Inhalation DAILY Andres Cárdenas MD   1 Puff at 03/09/22 0815    predniSONE (DELTASONE) tablet 20 mg  20 mg Oral DAILY WITH BREAKFAST Andres Cárdenas MD   20 mg at 03/09/22 0850    benzonatate (TESSALON) capsule 100 mg  100 mg Oral TID PRN Andres Cárdenas MD        albuterol CONCENTRATE 2.5mg/0.5 mL neb soln  2.5 mg Nebulization Q4H PRN Master RIVERA MD            Review of Systems   Constitutional: Positive for activity change, appetite change and fatigue. Negative for fever and unexpected weight change. HENT: Negative for congestion, facial swelling, postnasal drip, sinus pressure and trouble swallowing. Eyes: Negative for discharge, redness and visual disturbance. Respiratory: Negative for cough, chest tightness, shortness of breath and wheezing. Cardiovascular: Negative for chest pain, palpitations and leg swelling. Gastrointestinal: Positive for abdominal pain. Negative for abdominal distention, constipation, diarrhea, nausea and vomiting. Endocrine: Negative for cold intolerance, heat intolerance and polyuria.    Genitourinary: Positive for decreased urine volume and difficulty urinating. Negative for hematuria. Musculoskeletal: Negative for arthralgias, back pain, gait problem, joint swelling and myalgias. Skin: Negative for color change and pallor. Allergic/Immunologic: Negative for environmental allergies and food allergies. Neurological: Negative for dizziness, tremors, seizures, facial asymmetry and headaches. Hematological: Negative for adenopathy. Does not bruise/bleed easily. Psychiatric/Behavioral: Negative for agitation, behavioral problems and confusion. Objective     Vital signs for last 24 hours:  Visit Vitals  BP 95/64   Pulse (!) 101   Temp 98.4 °F (36.9 °C)   Resp 16   Ht 5' 4\" (1.626 m)   Wt 54.4 kg (120 lb)   SpO2 95%   BMI 20.60 kg/m²           Recent Results (from the past 24 hour(s))   METABOLIC PANEL, COMPREHENSIVE    Collection Time: 03/09/22  6:21 AM   Result Value Ref Range    Sodium 136 136 - 145 mmol/L    Potassium 4.0 3.5 - 5.1 mmol/L    Chloride 102 97 - 108 mmol/L    CO2 28 21 - 32 mmol/L    Anion gap 6 5 - 15 mmol/L    Glucose 137 (H) 65 - 100 mg/dL    BUN 50 (H) 6 - 20 mg/dL    Creatinine 1.73 (H) 0.55 - 1.02 mg/dL    BUN/Creatinine ratio 29 (H) 12 - 20      GFR est AA 34 (L) >60 ml/min/1.73m2    GFR est non-AA 28 (L) >60 ml/min/1.73m2    Calcium 7.6 (L) 8.5 - 10.1 mg/dL    Bilirubin, total 0.6 0.2 - 1.0 mg/dL    AST (SGOT) 23 15 - 37 U/L    ALT (SGPT) 15 12 - 78 U/L    Alk. phosphatase 97 45 - 117 U/L    Protein, total 6.5 6.4 - 8.2 g/dL    Albumin 2.1 (L) 3.5 - 5.0 g/dL    Globulin 4.4 (H) 2.0 - 4.0 g/dL    A-G Ratio 0.5 (L) 1.1 - 2.2          No intake or output data in the 24 hours ending 03/09/22 1622   Current Shift: No intake/output data recorded. Last 3 Shifts: No intake/output data recorded. Physical Exam  Vitals and nursing note reviewed. Constitutional:       Appearance: Normal appearance. She is normal weight. HENT:      Head: Normocephalic and atraumatic.       Nose: Nose normal.      Mouth/Throat: Mouth: Mucous membranes are moist.      Pharynx: Oropharynx is clear. Eyes:      Conjunctiva/sclera: Conjunctivae normal.   Cardiovascular:      Rate and Rhythm: Regular rhythm. Tachycardia present. Pulses: Normal pulses. Heart sounds: Normal heart sounds. Pulmonary:      Effort: Pulmonary effort is normal.      Breath sounds: Normal breath sounds. Abdominal:      General: Abdomen is flat. Palpations: Abdomen is soft. Skin:     General: Skin is warm and dry. Coloration: Skin is not pale. Findings: No erythema. Neurological:      General: No focal deficit present. Mental Status: She is alert and oriented to person, place, and time. Psychiatric:         Mood and Affect: Mood normal.         Behavior: Behavior normal.          Data Review:   Recent Results (from the past 24 hour(s))   METABOLIC PANEL, COMPREHENSIVE    Collection Time: 03/09/22  6:21 AM   Result Value Ref Range    Sodium 136 136 - 145 mmol/L    Potassium 4.0 3.5 - 5.1 mmol/L    Chloride 102 97 - 108 mmol/L    CO2 28 21 - 32 mmol/L    Anion gap 6 5 - 15 mmol/L    Glucose 137 (H) 65 - 100 mg/dL    BUN 50 (H) 6 - 20 mg/dL    Creatinine 1.73 (H) 0.55 - 1.02 mg/dL    BUN/Creatinine ratio 29 (H) 12 - 20      GFR est AA 34 (L) >60 ml/min/1.73m2    GFR est non-AA 28 (L) >60 ml/min/1.73m2    Calcium 7.6 (L) 8.5 - 10.1 mg/dL    Bilirubin, total 0.6 0.2 - 1.0 mg/dL    AST (SGOT) 23 15 - 37 U/L    ALT (SGPT) 15 12 - 78 U/L    Alk. phosphatase 97 45 - 117 U/L    Protein, total 6.5 6.4 - 8.2 g/dL    Albumin 2.1 (L) 3.5 - 5.0 g/dL    Globulin 4.4 (H) 2.0 - 4.0 g/dL    A-G Ratio 0.5 (L) 1.1 - 2.2           US RETROPERITONEUM COMP   Final Result   Medical renal disease      CT ABD PELV WO CONT   Final Result   1. Cholelithiasis, but no CT evidence of acute cholecystitis. 2. Small bilateral pleural effusions with basilar atelectasis. 3. Atherosclerotic disease. 4. Nonobstructing renal stones. No ureteral or bladder stone. No hydronephrosis. 5. Please see above report for further details. XR CHEST PORT   Final Result   No acute pulmonary process. Patient Active Problem List   Diagnosis Code    Hypertension I10    Asthma J45.909    S/P colonoscopy Z98.890    Low back pain M54.50    ACP (advance care planning) Z71.89    Osteoporosis M81.0    Cough R05.9    Cholelithiasis K80.20    Dyslipidemia E78.5    Prediabetes R73.03    Osteoarthritis M19.90    Trigger finger, right M65.30    Hip pain, chronic, left M25.552, G89.29    Dehydration E86.0    JORDANA (acute kidney injury) ([facility] [address] N17.9        DIAGNOSES:  1. 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 CKD baseline not known  3. History of hypertension  4. Chronic anemia  5. Sepsis/leukocytosissource not clear  6. Hypomagnesemiamild  7. Metabolic acidosis, mild  8. Hyperkalemia, mild  DISCUSSION:   Presentation is congestive of JORDANA prerenal or sepsis associated   Renal ultrasound findings of 8.1 cm kidney/8.6 cm kidney suggest chronicity.  Most likely cause of CKD  is hypertensive nephrosclerosis but could also be chronic NSAID use    PLAN:  DC bicarbonate and modest rate of normal saline  Work-up for anemia will be sent      Thanks for consulting me. Please don't hesitate to contact me if any questions arise of if I can assist in any manner. This dictation was done by dragon, computer voice recognition software. Often unanticipated grammatical, syntax, phones and other interpretive errors are inadvertently transcribed. Please excuse errors that have escaped final proofreading. Please contact me if you suspect dictation or transcription errors.   Dr Carballo Silence  [address], [address] [facility], [address] [facility]  Cell Phone: [phone]  Office phone: [phone]  Fax: [phone]

## 2022-03-09 NOTE — PROGRESS NOTES
Met w/patient who was sitting up in the chair. Informed her d/c recommendation from PT is for [facility] at time of discharge. Patient in agreement w/this recommendation. Gave verbal consent for writer to send [facility] referrals out on her behalf. Patient lives w/her daughter. Referrals sent via [facility]. Pending acceptance.            JEAN MARIE Acosta

## 2022-03-09 NOTE — PROGRESS NOTES
Progress Note    Patient: J Carlos Cardozo MRN: [id number]  SSN: [id number]    YOB: 1933  Age: 80 y.o. Sex: female      Admit Date: 3/7/2022    LOS: 2 days     Subjective:     80years old with asthma, acute kidney and ureter with slight improvement in renal failure    Objective:     Vitals:    03/08/22 1904 03/09/22 0743 03/09/22 0816 03/09/22 1437   BP: 111/72 117/74  95/64   Pulse: (!) 103 85  (!) 101   Resp: 18 16  16   Temp: 98.2 °F (36.8 °C) 98.3 °F (36.8 °C)  98.4 °F (36.9 °C)   SpO2: 98% 98% 96% 95%   Weight:       Height:            Intake and Output:  Current Shift: No intake/output data recorded. Last three shifts: No intake/output data recorded. Physical Exam:   General:  Alert, cooperative, no distress, appears stated age. Eyes:  Conjunctivae/corneas clear. PERRL, EOMs intact. Fundi benign   Ears:  Normal TMs and external ear canals both ears. Nose: Nares normal. Septum midline. Mucosa normal. No drainage or sinus tenderness. Mouth/Throat: Lips, mucosa, and tongue normal. Teeth and gums normal.   Neck: Supple, symmetrical, trachea midline, no adenopathy, thyroid: no enlargment/tenderness/nodules, no carotid bruit and no JVD. Back:   Symmetric, no curvature. ROM normal. No CVA tenderness. Lungs:   Clear to auscultation bilaterally. Heart:  Regular rate and rhythm, S1, S2 normal, no murmur, click, rub or gallop. Abdomen:   Soft, non-tender. Bowel sounds normal. No masses,  No organomegaly. Extremities: Extremities normal, atraumatic, no cyanosis or edema. Pulses: 2+ and symmetric all extremities. Skin: Skin color, texture, turgor normal. No rashes or lesions   Lymph nodes: Cervical, supraclavicular, and axillary nodes normal.   Neurologic: CNII-XII intact. Normal strength, sensation and reflexes throughout. Lab/Data Review: All lab results for the last 24 hours reviewed.      Recent Results (from the past 24 hour(s))   METABOLIC PANEL, COMPREHENSIVE Collection Time: 03/09/22  6:21 AM   Result Value Ref Range    Sodium 136 136 - 145 mmol/L    Potassium 4.0 3.5 - 5.1 mmol/L    Chloride 102 97 - 108 mmol/L    CO2 28 21 - 32 mmol/L    Anion gap 6 5 - 15 mmol/L    Glucose 137 (H) 65 - 100 mg/dL    BUN 50 (H) 6 - 20 mg/dL    Creatinine 1.73 (H) 0.55 - 1.02 mg/dL    BUN/Creatinine ratio 29 (H) 12 - 20      GFR est AA 34 (L) >60 ml/min/1.73m2    GFR est non-AA 28 (L) >60 ml/min/1.73m2    Calcium 7.6 (L) 8.5 - 10.1 mg/dL    Bilirubin, total 0.6 0.2 - 1.0 mg/dL    AST (SGOT) 23 15 - 37 U/L    ALT (SGPT) 15 12 - 78 U/L    Alk.  phosphatase 97 45 - 117 U/L    Protein, total 6.5 6.4 - 8.2 g/dL    Albumin 2.1 (L) 3.5 - 5.0 g/dL    Globulin 4.4 (H) 2.0 - 4.0 g/dL    A-G Ratio 0.5 (L) 1.1 - 2.2           Assessment:     Active Problems:    Dehydration (3/7/2022)      [facility] (3/7/2022)        Plan:       Gentle hydration monitor electrolytes    Signed By: Rina Charles MD     March 9, 2022

## 2022-03-09 NOTE — PROGRESS NOTES
OCCUPATIONAL THERAPY EVALUATION  Patient: Analia Farley (66 y.o. female)  Date: 3/9/2022  Primary Diagnosis: Dehydration [E86.0]  JORDANA (acute kidney injury) ([facility] [address] [N17.9]        Precautions: fall risk       ASSESSMENT  Pt is a 81 y/o F with PMH of asthma, HTN, and hyperlipedmia presenting to [facility] with c/o fatigue x3, admitted on 3/7  and being treated for dehydration and JORDANA. Pt received sitting EOB w/ warm blanket wrapped on arm (nurse recently saw patient and noticed swelling near IV site) upon arrival, AXO x2, and agreeable to OT/PT evaluations at this time. Per pt report, pt lives with family in a one-story home with 4 SUDHEER and B HR, was IND with ADLs and mobility at PLOF. Other DME owned includes: cane, RW      Based on current observations, pt presents with deficits in generalized strength, and static/dynamic standing balance impacting overall performance of ADLs and functional transfers/mobility. Pt currently requires SUP to CGA for all functional mobility using RW including STS, bathroom commode transfer, and bed>chair transfer. Pt INDly donned B socks sitting at EOB, no LOB noted and good dynamic sitting balance. Pt req'd SUP for hand hygiene standing at sinktop. Overall, pt tolerates session well w/out c/o pain and good activity tolerance. Pt would benefit from continued skilled OT services to address current impairments and improve IND and safety with self cares and functional transfers/mobility. Current OT d/c recommendation home w/ family care and HHOT once medically appropriate. Other factors to consider for discharge: family/social support, DME, time since onset, severity of deficits, decline from functional baseline     Patient will benefit from skilled therapy intervention to address the above noted impairments.        PLAN :  Recommendations and Planned Interventions: self care training, functional mobility training, therapeutic exercise, therapeutic activities, endurance activities, patient education and home safety training    Frequency/Duration: Patient will be followed by occupational therapy:  3-5x/week to address goals. Recommendation for discharge: (in order for the patient to meet his/her long term goals)  Home w/ family care and HHOT    This discharge recommendation:  Has been made in collaboration with the attending provider and/or case management    IF patient discharges home will need the following DME: patient owns DME required for discharge       SUBJECTIVE:   Patient stated I can get dressed on my own.     OBJECTIVE DATA SUMMARY:   HISTORY:   Past Medical History:   Diagnosis Date    Asthma     Bereavement 3/30/16    65 yo con  - multiple myleoma     Cholelithiasis     Cough     Dyslipidemia     Hip pain, chronic, left 2017    Hypertension     Low back pain 16    Osteoarthritis     Osteoporosis 16    Prediabetes     S/P colonoscopy 7/15    vcu    Trigger finger, right 2017    Wedge compression fx of second thoracic vertebra with routine healing 16    t11     Past Surgical History:   Procedure Laterality Date    HX COLONOSCOPY      HX GYN         Expanded or extensive additional review of patient history:     Home Situation  Home Environment: Private residence  # Steps to Enter: 4  Rails to Enter: Yes  Hand Rails : Bilateral  One/Two Story Residence: One story  Living Alone: No  Support Systems: Other Family Member(s) (daughter, daughter-in-law, son)  Patient Expects to be Discharged to[de-identified] Home  Current DME Used/Available at Home: Cane, straight,Walker, rolling  Tub or Shower Type: Tub/Shower combination (small step in )        EXAMINATION OF PERFORMANCE DEFICITS:  Cognitive/Behavioral Status:  Neurologic State: Alert  Orientation Level: Oriented to person;Oriented to place (cues for month and year )  Cognition: Follows commands; Appropriate safety awareness                 Hearing:   Auditory  Auditory Impairment: None      Range of Motion:  AROM: Within functional limits                         Strength:  Strength: Generally decreased, functional                Coordination:     Fine Motor Skills-Upper: Left Intact; Right Intact    Gross Motor Skills-Upper: Left Intact; Right Intact      Balance:  Sitting: Intact; Without support  Standing: Intact; With support (RW)    Functional Mobility and Transfers for ADLs:  Bed Mobility:       Transfers:  Sit to Stand: Contact guard assistance  Stand to Sit: Contact guard assistance  Bed to Chair: Contact guard assistance (RW)  Bathroom Mobility: Supervision/set up (RW)  Toilet Transfer : Contact guard assistance (cues for handplacement on toilet commode and grab bar)      ADL Intervention and task modifications:       Grooming  Position Performed: Standing  Washing Hands: Supervision                   Lower Body Dressing Assistance  Socks: Set-up  Position Performed: Seated edge of bed              Therapeutic Exercise:  Pt will benefit from BUE HEP to improve participation in ADLs and mobility. Plan will be initiated at next session. Functional Measure:    [employer] AM-PACTM \"6 Clicks\"                                                       Daily Activity Inpatient Short Form  How much help from another person does the patient currently need. .. Total; A Lot A Little None   1. Putting on and taking off regular lower body clothing? []  1 []  2 []  3 [x]  4   2. Bathing (including washing, rinsing, drying)? []  1 []  2 []  3 [x]  4   3. Toileting, which includes using toilet, bedpan or urinal? [] 1 []  2 [x]  3 []  4   4. Putting on and taking off regular upper body clothing? []  1 []  2 []  3 [x]  4   5. Taking care of personal grooming such as brushing teeth? []  1 []  2 []  3 [x]  4   6. Eating meals? []  1 []  2 []  3 [x]  4   © 2007, Trustees of [facility] [employer], under license to [employer].  All rights reserved     Score: 23/24     Interpretation of Tool:  Represents clinically-significant functional categories (i.e. Activities of daily living). Percentage of Impairment CH    0%   CI    1-19% CJ    20-39% CK    40-59% CL    60-79% CM    80-99% CN     100%   [facility]  Score 6-24 24 23 20-22 15-19 10-14 7-9 6         Occupational Therapy Evaluation Charge Determination   History Examination Decision-Making   LOW Complexity : Brief history review  LOW Complexity : 1-3 performance deficits relating to physical, cognitive , or psychosocial skils that result in activity limitations and / or participation restrictions  LOW Complexity : No comorbidities that affect functional and no verbal or physical assistance needed to complete eval tasks       Based on the above components, the patient evaluation is determined to be of the following complexity level: LOW   Pain Rating:  Pt did not report pain. Activity Tolerance:   Good    After treatment patient left in no apparent distress:    Sitting in chair and Call bell within reach    COMMUNICATION/EDUCATION:   The patients plan of care was discussed with: Physical therapist and Registered nurse. Patient/family have participated as able in goal setting and plan of care. and Patient/family agree to work toward stated goals and plan of care. This patients plan of care is appropriate for delegation to NICOLE.     OT/PT sessions occurred together for increased patient and clinician safety    Thank you for this referral.  Dawson Orantes OT  Time Calculation: 22 mins   Problem: Self Care Deficits Care Plan (Adult)  Goal: *Acute Goals and Plan of Care (Insert Text)  Description: Pt will be IND grooming standing at sinktop   Pt will be IND LE dressing sitting EOB/long sit  Pt will be IND sit <>  prep for toileting LRAD  Pt will be IND toileting/toilet transfer/cloth mgmt LRAD  Pt will be IND following UE HEP in prep for self care tasks     3/9/2022 1627 by Dayanara Casarez OT  Outcome: Not Met  3/9/2022 1514 by Dayanara Casarez, OT  Outcome: Not Met

## 2022-03-09 NOTE — PROGRESS NOTES
PHYSICAL THERAPY EVALUATION  Patient: Marlon Luis (69 y.o. female)  Date: 3/9/2022  Primary Diagnosis: Dehydration [E86.0]  [facility]) ([facility] [N17.9]        Precautions: standard       ASSESSMENT  Pt is an 81 yo female admitted on 3/7/2022 for not feeling well; pt currently being treated for dehydration and JORDANA. PMH: asthma, cholelithiasis, HLD, left hip pain, HTN, OA, wedge compression fx T11 (2016). Pt seated EOB upon PT arrival, agreeable to evaluation. Pt A&O x 4. Per pt report pt resides with family in a 1 story home with 4 SUDHEER, bilateral handrails, pt was I for ADLS/IADLS, no AD with mobility prior to admission. DME: SPC and RW. Based on the objective data described below, the patient presents with generalized weakness, impaired functional mobility, impaired amb, impaired balance, and decreased activity tolerance. Pt required SBA to CGA sit <> stand transfers. Pt amb 220 feet with gt belt, RW, and CGA; demonstrating steady, step through gt pattern with no LOB or knee buckling noted. Pt did well with session today with no c/o pain throughout session and reports being close to baseline. Due to age and PMH pt is at a high risk of deconditioning, pt will benefit from continued skilled PT to address above deficits and return to PLOF. Current PT DC recommendation HHPT. Current Level of Function Impacting Discharge (mobility/balance): SBA to CGA    Other factors to consider for discharge: severity of deficits      PLAN :  Recommendations and Planned Interventions: bed mobility training, transfer training, gait training, therapeutic exercises, neuromuscular re-education, patient and family training/education and therapeutic activities      Frequency/Duration: Patient will be followed by physical therapy:  2-3x/week to address goals.     Recommendation for discharge: (in order for the patient to meet his/her long term goals)  Home with [address] [place] [address]    This discharge recommendation:  Has been made in collaboration with the attending provider and/or case management    IF patient discharges home will need the following DME: none         SUBJECTIVE:   Patient stated i'm feeling ok.     OBJECTIVE DATA SUMMARY:   HISTORY:    Past Medical History:   Diagnosis Date    Asthma     Bereavement 3/30/16    65 yo con  - multiple myleoma     Cholelithiasis     Cough     Dyslipidemia     Hip pain, chronic, left 2017    Hypertension     Low back pain 16    Osteoarthritis     Osteoporosis 16    Prediabetes     S/P colonoscopy 7/15    vcu    Trigger finger, right 2017    Wedge compression fx of second thoracic vertebra with routine healing 16    t11     Past Surgical History:   Procedure Laterality Date    HX COLONOSCOPY      HX GYN         Home Situation  Home Environment: Private residence  # Steps to Enter: 4  Rails to Enter: Yes  Hand Rails : Bilateral  One/Two Story Residence: One story  Living Alone: No  Support Systems: Other Family Member(s) (daughter, daughter-in-law, son)  Patient Expects to be Discharged to[de-identified] Home  Current DME Used/Available at Home: Cane, straight,Walker, rolling  Tub or Shower Type: Tub/Shower combination (small step in )    EXAMINATION/PRESENTATION/DECISION MAKING:   Critical Behavior:  Neurologic State: Alert  Orientation Level: Oriented to person,Oriented to place (cues for month and year )  Cognition: Follows commands,Appropriate safety awareness  Safety/Judgement: Awareness of environment  Hearing:   Auditory  Auditory Impairment: None  Skin:  Intact where visible  Edema: right forearm, per nsg from iv infiltration warm compress in place  Range Of Motion:  AROM: Within functional limits                       Strength:    Strength: Generally decreased, functional                    Tone & Sensation:                                  Coordination:     Vision:      Functional Mobility:  Bed Mobility: Transfers:  Sit to Stand: Contact guard assistance  Stand to Sit: Contact guard assistance        Bed to Chair: Contact guard assistance (RW)              Balance:   Sitting: Intact; Without support  Standing: Intact; With support (RW)  Ambulation/Gait Training:  Distance (ft): 220 Feet (ft)  Assistive Device: Gait belt;Walker, rolling  Ambulation - Level of Assistance: Contact guard assistance     Gait Description (WDL): Exceptions to WDL           Base of Support: Widened     Speed/Daina: Other (comment) [facility])       Therapeutic Exercises:   Not completed this session    Functional Measure:  [facility] Mobility Inpatient Short Form  How much difficulty does the patient currently have. .. Unable A Lot A Little None   1. Turning over in bed (including adjusting bedclothes, sheets and blankets)? [] 1   [] 2   [x] 3   [] 4   2. Sitting down on and standing up from a chair with arms ( e.g., wheelchair, bedside commode, etc.)   [] 1   [] 2   [x] 3   [] 4   3. Moving from lying on back to sitting on the side of the bed? [] 1   [] 2   [x] 3   [] 4          How much help from another person does the patient currently need. .. Total A Lot A Little None   4. Moving to and from a bed to a chair (including a wheelchair)? [] 1   [] 2   [x] 3   [] 4   5. Need to walk in hospital room? [] 1   [] 2   [x] 3   [] 4   6. Climbing 3-5 steps with a railing? [] 1   [] 2   [x] 3   [] 4   © 2007, Trustees of [address] [place] [address] [address] [address], under license to [employer]. All rights reserved     Score:  Initial: 18/24 Most Recent: X (Date: 3/9/22 )   Interpretation of Tool:  Represents activities that are increasingly more difficult (i.e. Bed mobility, Transfers, Gait).   Score 24 23 22-20 19-15 14-10 9-7 6   Modifier CH CI CJ CK CL CM CN         Physical Therapy Evaluation Charge Determination   History Examination Presentation Decision-Making   HIGH Complexity :3+ comorbidities / personal factors will impact the outcome/ POC  HIGH Complexity : 4+ Standardized tests and measures addressing body structure, function, activity limitation and / or participation in recreation  LOW Complexity : Stable, uncomplicated  Other outcome measures ampac 6  mod      Based on the above components, the patient evaluation is determined to be of the following complexity level: LOW     Pain Ratin/10 reported    Activity Tolerance:   Fair and requires rest breaks    After treatment patient left in no apparent distress:   Sitting in chair and Call bell within reach and nsg updated. GOALS:    Problem: Mobility Impaired (Adult and Pediatric)  Goal: *Acute Goals and Plan of Care (Insert Text)  Description: Pt will be I with LE HEP in 7 days. Pt will perform bed mobility with mod I in 7 days. Pt will perform transfers with mod I in 7 days. Pt will amb  feet with LRAD safely with mod I in 7 days. Pt will ascend/descend 4 steps with B handrails and CGA in 7 days to safely enter home. Outcome: Not Met       COMMUNICATION/EDUCATION:   The patients plan of care was discussed with: Occupational therapist, Registered nurse, and Case management. Fall prevention education was provided and the patient/caregiver indicated understanding., Patient/family have participated as able in goal setting and plan of care. , and Patient/family agree to work toward stated goals and plan of care. PT/OT sessions occurred together for increased safety of pt and clinician.        Thank you for this referral.  Valente Martínez PT, DPT   Time Calculation: 23 mins

## 2022-03-10 LAB
ANION GAP SERPL CALC-SCNC: 7 MMOL/L (ref 5–15)
BUN SERPL-MCNC: 46 MG/DL (ref 6–20)
BUN/CREAT SERPL: 27 (ref 12–20)
CA-I BLD-MCNC: 7.7 MG/DL (ref 8.5–10.1)
CHLORIDE SERPL-SCNC: 101 MMOL/L (ref 97–108)
CO2 SERPL-SCNC: 28 MMOL/L (ref 21–32)
CREAT SERPL-MCNC: 1.69 MG/DL (ref 0.55–1.02)
FERRITIN SERPL-MCNC: 814 NG/ML (ref 26–388)
FOLATE SERPL-MCNC: 7.9 NG/ML (ref 5–21)
GLUCOSE SERPL-MCNC: 90 MG/DL (ref 65–100)
HCT VFR BLD AUTO: 23.2 % (ref 35–47)
HGB BLD-MCNC: 7.3 G/DL (ref 11.5–16)
POTASSIUM SERPL-SCNC: 4.7 MMOL/L (ref 3.5–5.1)
SODIUM SERPL-SCNC: 136 MMOL/L (ref 136–145)
VIT B12 SERPL-MCNC: 567 PG/ML (ref 193–986)

## 2022-03-10 PROCEDURE — 83540 ASSAY OF IRON: CPT

## 2022-03-10 PROCEDURE — 82728 ASSAY OF FERRITIN: CPT

## 2022-03-10 PROCEDURE — 36415 COLL VENOUS BLD VENIPUNCTURE: CPT

## 2022-03-10 PROCEDURE — 80048 BASIC METABOLIC PNL TOTAL CA: CPT

## 2022-03-10 PROCEDURE — 74011636637 HC RX REV CODE- 636/637: Performed by: INTERNAL MEDICINE

## 2022-03-10 PROCEDURE — 74011000258 HC RX REV CODE- 258: Performed by: INTERNAL MEDICINE

## 2022-03-10 PROCEDURE — 74011250637 HC RX REV CODE- 250/637: Performed by: INTERNAL MEDICINE

## 2022-03-10 PROCEDURE — 85018 HEMOGLOBIN: CPT

## 2022-03-10 PROCEDURE — 65270000029 HC RM PRIVATE

## 2022-03-10 PROCEDURE — 94640 AIRWAY INHALATION TREATMENT: CPT

## 2022-03-10 PROCEDURE — 74011250636 HC RX REV CODE- 250/636: Performed by: INTERNAL MEDICINE

## 2022-03-10 RX ADMIN — LEVOTHYROXINE SODIUM 100 MCG: 0.1 TABLET ORAL at 05:23

## 2022-03-10 RX ADMIN — HEPARIN SODIUM 5000 UNITS: 5000 INJECTION INTRAVENOUS; SUBCUTANEOUS at 18:02

## 2022-03-10 RX ADMIN — DICLOFENAC 2 G: 10 GEL TOPICAL at 13:00

## 2022-03-10 RX ADMIN — DICLOFENAC 2 G: 10 GEL TOPICAL at 18:00

## 2022-03-10 RX ADMIN — ATORVASTATIN CALCIUM 40 MG: 40 TABLET, FILM COATED ORAL at 18:01

## 2022-03-10 RX ADMIN — MONTELUKAST 10 MG: 10 TABLET, FILM COATED ORAL at 08:22

## 2022-03-10 RX ADMIN — FLUTICASONE FUROATE, UMECLIDINIUM BROMIDE AND VILANTEROL TRIFENATATE 1 PUFF: 200; 62.5; 25 POWDER RESPIRATORY (INHALATION) at 09:34

## 2022-03-10 RX ADMIN — DICLOFENAC 2 G: 10 GEL TOPICAL at 09:00

## 2022-03-10 RX ADMIN — PIPERACILLIN SODIUM AND TAZOBACTAM SODIUM 3.38 G: 3; .375 INJECTION, POWDER, LYOPHILIZED, FOR SOLUTION INTRAVENOUS at 11:13

## 2022-03-10 RX ADMIN — PANTOPRAZOLE SODIUM 40 MG: 40 TABLET, DELAYED RELEASE ORAL at 08:20

## 2022-03-10 RX ADMIN — MIRTAZAPINE 45 MG: 30 TABLET, FILM COATED ORAL at 20:30

## 2022-03-10 RX ADMIN — PIPERACILLIN SODIUM AND TAZOBACTAM SODIUM 3.38 G: 3; .375 INJECTION, POWDER, LYOPHILIZED, FOR SOLUTION INTRAVENOUS at 22:15

## 2022-03-10 RX ADMIN — PREDNISONE 20 MG: 20 TABLET ORAL at 08:20

## 2022-03-10 RX ADMIN — HEPARIN SODIUM 5000 UNITS: 5000 INJECTION INTRAVENOUS; SUBCUTANEOUS at 08:20

## 2022-03-10 RX ADMIN — DICLOFENAC 2 G: 10 GEL TOPICAL at 20:31

## 2022-03-10 NOTE — PROGRESS NOTES
Consult Date: 3/10/2022    Subjective   HISTORY OF PRESENTING ILLNESS     Seen and examined. Reports that she is feeling well. Eating better and drinking better. Mostly in bed still    Past Medical History:   Diagnosis Date    Asthma     Bereavement 3/30/16    63 yo con  - multiple myleoma     Cholelithiasis     Cough     Dyslipidemia     Hip pain, chronic, left 2017    Hypertension     Low back pain 16    Osteoarthritis     Osteoporosis 16    Prediabetes     S/P colonoscopy 7/15    vcu    Trigger finger, right 2017    Wedge compression fx of second thoracic vertebra with routine healing 16    t11      Past Surgical History:   Procedure Laterality Date    HX COLONOSCOPY      HX GYN       Family History   Family history unknown: Yes   Problem Relation Age of Onset    Family history unknown:  Yes    Cancer Son       Social History     Tobacco Use    Smoking status: Never Smoker    Smokeless tobacco: Never Used   Substance Use Topics    Alcohol use: Not Currently       Current Facility-Administered Medications   Medication Dose Route Frequency Provider Last Rate Last Admin    0.9% sodium chloride infusion  50 mL/hr IntraVENous CONTINUOUS Galina Menjivar MD 50 mL/hr at 22 50 mL/hr at 22 204    piperacillin-tazobactam (ZOSYN) 3.375 g in 0.9% sodium chloride (MBP/ADV) 100 mL MBP  3.375 g IntraVENous Q12H Alexandra RIVERA MD 25 mL/hr at 03/10/22 1113 3.375 g at 03/10/22 1113    atorvastatin (LIPITOR) tablet 40 mg  40 mg Oral QPM Alexandra RIVERA MD   40 mg at 22 1704    diclofenac (VOLTAREN) 1 % topical gel 2 g  2 g Topical QID Saad Nation MD   2 g at 03/10/22 0900    levothyroxine (SYNTHROID) tablet 100 mcg  100 mcg Oral Shilo RIVERA MD   100 mcg at 03/10/22 0523    mirtazapine (REMERON) tablet 45 mg  45 mg Oral QHS Alexandra RIVERA MD   45 mg at 22 204    montelukast (SINGULAIR) tablet 10 mg  10 mg Oral DAILY Camilo Hart MD   10 mg at 03/10/22 6430    pantoprazole (PROTONIX) tablet 40 mg  40 mg Oral DAILY Barrera RIVERA MD   40 mg at 03/10/22 0820    sodium chloride (NS) flush 5-40 mL  5-40 mL IntraVENous PRN Barrera RIVERA MD        heparin (porcine) injection 5,000 Units  5,000 Units SubCUTAneous Q12H Camilo Hart MD   5,000 Units at 03/10/22 0820    fluticasone-umeclidin-vilanter (TRELEGY ELLIPTA) inhaler 1 Puff  1 Puff Inhalation DAILY Camilo Hart MD   1 Puff at 03/10/22 0934    predniSONE (DELTASONE) tablet 20 mg  20 mg Oral DAILY WITH BREAKFAST Camilo Hart MD   20 mg at 03/10/22 0820    benzonatate (TESSALON) capsule 100 mg  100 mg Oral TID PRN Barrera RIVERA MD        albuterol CONCENTRATE 2.5mg/0.5 mL neb soln  2.5 mg Nebulization Q4H PRN Camilo Hart MD            Review of Systems   Constitutional: Positive for fatigue. Negative for activity change, appetite change, fever and unexpected weight change. HENT: Negative for congestion, facial swelling, postnasal drip, sinus pressure and trouble swallowing. Eyes: Negative for discharge, redness and visual disturbance. Respiratory: Negative for cough, chest tightness, shortness of breath and wheezing. Cardiovascular: Negative for chest pain, palpitations and leg swelling. Gastrointestinal: Negative for abdominal distention, constipation, diarrhea, nausea and vomiting. Endocrine: Negative for cold intolerance, heat intolerance and polyuria. Genitourinary: Negative for decreased urine volume, difficulty urinating and hematuria. Musculoskeletal: Negative for arthralgias, back pain, gait problem, joint swelling and myalgias. Skin: Negative for color change and pallor. Allergic/Immunologic: Negative for environmental allergies and food allergies. Neurological: Negative for dizziness, tremors, seizures, facial asymmetry and headaches. Hematological: Negative for adenopathy.  Does not bruise/bleed easily. Psychiatric/Behavioral: Negative for agitation, behavioral problems and confusion. Objective     Vital signs for last 24 hours:  Visit Vitals  /83 (BP 1 Location: Left upper arm, BP Patient Position: At rest;Supine)   Pulse 86   Temp 97.6 °F (36.4 °C)   Resp 16   Ht 5' 4\" (1.626 m)   Wt 54.4 kg (120 lb)   SpO2 95%   BMI 20.60 kg/m²           Recent Results (from the past 24 hour(s))   TSH 3RD GENERATION    Collection Time: 03/09/22  6:25 PM   Result Value Ref Range    TSH 0.36 0.36 - 7.36 uIU/mL   METABOLIC PANEL, BASIC    Collection Time: 03/10/22  6:28 AM   Result Value Ref Range    Sodium 136 136 - 145 mmol/L    Potassium 4.7 3.5 - 5.1 mmol/L    Chloride 101 97 - 108 mmol/L    CO2 28 21 - 32 mmol/L    Anion gap 7 5 - 15 mmol/L    Glucose 90 65 - 100 mg/dL    BUN 46 (H) 6 - 20 mg/dL    Creatinine 1.69 (H) 0.55 - 1.02 mg/dL    BUN/Creatinine ratio 27 (H) 12 - 20      GFR est AA 35 (L) >60 ml/min/1.73m2    GFR est non-AA 29 (L) >60 ml/min/1.73m2    Calcium 7.7 (L) 8.5 - 10.1 mg/dL          Intake/Output Summary (Last 24 hours) at 3/10/2022 1201  Last data filed at 3/10/2022 1116  Gross per 24 hour   Intake    Output 1000 ml   Net -1000 ml      Current Shift: 03/10 0701 - 03/10 1900  In: -   Out: 1000 [Urine:1000]  Last 3 Shifts: No intake/output data recorded. Physical Exam  Vitals and nursing note reviewed. Constitutional:       Appearance: Normal appearance. She is normal weight. HENT:      Head: Normocephalic and atraumatic. Nose: Nose normal.      Mouth/Throat:      Mouth: Mucous membranes are moist.      Pharynx: Oropharynx is clear. Eyes:      Conjunctiva/sclera: Conjunctivae normal.   Cardiovascular:      Rate and Rhythm: Regular rhythm. Tachycardia present. Pulses: Normal pulses. Heart sounds: Normal heart sounds. Pulmonary:      Effort: Pulmonary effort is normal.      Breath sounds: Normal breath sounds. Abdominal:      General: Abdomen is flat. Palpations: Abdomen is soft. Skin:     General: Skin is warm and dry. Coloration: Skin is not pale. Findings: No erythema. Neurological:      General: No focal deficit present. Mental Status: She is alert and oriented to person, place, and time. Psychiatric:         Mood and Affect: Mood normal.         Behavior: Behavior normal.          Data Review:   Recent Results (from the past 24 hour(s))   TSH 3RD GENERATION    Collection Time: 03/09/22  6:25 PM   Result Value Ref Range    TSH 0.36 0.36 - 6.80 uIU/mL   METABOLIC PANEL, BASIC    Collection Time: 03/10/22  6:28 AM   Result Value Ref Range    Sodium 136 136 - 145 mmol/L    Potassium 4.7 3.5 - 5.1 mmol/L    Chloride 101 97 - 108 mmol/L    CO2 28 21 - 32 mmol/L    Anion gap 7 5 - 15 mmol/L    Glucose 90 65 - 100 mg/dL    BUN 46 (H) 6 - 20 mg/dL    Creatinine 1.69 (H) 0.55 - 1.02 mg/dL    BUN/Creatinine ratio 27 (H) 12 - 20      GFR est AA 35 (L) >60 ml/min/1.73m2    GFR est non-AA 29 (L) >60 ml/min/1.73m2    Calcium 7.7 (L) 8.5 - 10.1 mg/dL         US RETROPERITONEUM COMP   Final Result   Medical renal disease      CT ABD PELV WO CONT   Final Result   1. Cholelithiasis, but no CT evidence of acute cholecystitis. 2. Small bilateral pleural effusions with basilar atelectasis. 3. Atherosclerotic disease. 4. Nonobstructing renal stones. No ureteral or bladder stone. No hydronephrosis. 5. Please see above report for further details. XR CHEST PORT   Final Result   No acute pulmonary process.               Patient Active Problem List   Diagnosis Code    Hypertension I10    Asthma J45.909    S/P colonoscopy Z98.890    Low back pain M54.50    ACP (advance care planning) Z71.89    Osteoporosis M81.0    Cough R05.9    Cholelithiasis K80.20    Dyslipidemia E78.5    Prediabetes R73.03    Osteoarthritis M19.90    Trigger finger, right M65.30    Hip pain, chronic, left M25.552, G89.29    Dehydration E86.0    [facility] N17.9        DIAGNOSES:  1. 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 CKD baseline not known  3. History of hypertension  4. Chronic anemia  5. Sepsis/leukocytosissource not clear  6. Hypomagnesemiamild  7. Metabolic acidosis, mild  8. Hyperkalemia, mild  DISCUSSION:   Improvement of renal functionmild   Evidence of hyperthyroidism   Renal ultrasound findings of 8.1 cm kidney/8.6 cm kidney suggest chronicity.  Most likely cause of CKD  is hypertensive nephrosclerosis but could also be chronic NSAID use    PLAN:  Anemia work-up underway  Will discontinue IV fluids      Thanks for consulting me. Please don't hesitate to contact me if any questions arise of if I can assist in any manner. This dictation was done by dragon, computer voice recognition software. Often unanticipated grammatical, syntax, phones and other interpretive errors are inadvertently transcribed. Please excuse errors that have escaped final proofreading. Please contact me if you suspect dictation or transcription errors.   Dr Kacie Rosales  [address] [place] [address], [address], [facility]  Cell Phone: [phone]  Office phone: [phone]  Fax: [phone]

## 2022-03-10 NOTE — PROGRESS NOTES
Progress Note    Patient: Analia Farley MRN: [id number]  SSN: [id number]    YOB: 1933  Age: 80 y.o. Sex: female      Admit Date: 3/7/2022    LOS: 3 days     Subjective:     Patient is doing well. She would like to go to a skilled nursing facility temporarily    Objective:     Vitals:    03/09/22 2019 03/10/22 0709 03/10/22 0832 03/10/22 1520   BP: 135/86 134/83  118/75   Pulse: 94 86  94   Resp: 16 16  18   Temp: 97.9 °F (36.6 °C) 97.6 °F (36.4 °C)  98 °F (36.7 °C)   SpO2: 97% 95% 95% 97%   Weight:       Height:            Intake and Output:  Current Shift: 03/10 0701 - 03/10 1900  In: -   Out: 1000 [Urine:1000]  Last three shifts: No intake/output data recorded. Physical Exam:   General:  Alert, cooperative, no distress, appears stated age. Eyes:  Conjunctivae/corneas clear. PERRL, EOMs intact. Fundi benign   Ears:  Normal TMs and external ear canals both ears. Nose: Nares normal. Septum midline. Mucosa normal. No drainage or sinus tenderness. Mouth/Throat: Lips, mucosa, and tongue normal. Teeth and gums normal.   Neck: Supple, symmetrical, trachea midline, no adenopathy, thyroid: no enlargment/tenderness/nodules, no carotid bruit and no JVD. Back:   Symmetric, no curvature. ROM normal. No CVA tenderness. Lungs:   Clear to auscultation bilaterally. Heart:  Regular rate and rhythm, S1, S2 normal, no murmur, click, rub or gallop. Abdomen:   Soft, non-tender. Bowel sounds normal. No masses,  No organomegaly. Extremities: Extremities normal, atraumatic, no cyanosis or edema. Pulses: 2+ and symmetric all extremities. Skin: Skin color, texture, turgor normal. No rashes or lesions   Lymph nodes: Cervical, supraclavicular, and axillary nodes normal.   Neurologic: CNII-XII intact. Normal strength, sensation and reflexes throughout. Lab/Data Review: All lab results for the last 24 hours reviewed.      Recent Results (from the past 24 hour(s))   METABOLIC PANEL, BASIC Collection Time: 03/10/22  6:28 AM   Result Value Ref Range    Sodium 136 136 - 145 mmol/L    Potassium 4.7 3.5 - 5.1 mmol/L    Chloride 101 97 - 108 mmol/L    CO2 28 21 - 32 mmol/L    Anion gap 7 5 - 15 mmol/L    Glucose 90 65 - 100 mg/dL    BUN 46 (H) 6 - 20 mg/dL    Creatinine 1.69 (H) 0.55 - 1.02 mg/dL    BUN/Creatinine ratio 27 (H) 12 - 20      GFR est AA 35 (L) >60 ml/min/1.73m2    GFR est non-AA 29 (L) >60 ml/min/1.73m2    Calcium 7.7 (L) 8.5 - 10.1 mg/dL         Assessment:     Active Problems:    Dehydration (3/7/2022)      JORDANA (acute kidney injury) ([facility] (3/7/2022)    Asthma exacerbation    Plan:     Is improving for possible discharge in a.m. to skilled nursing facility or home health    Signed By: Chip Benavides MD     March 10, 2022

## 2022-03-10 NOTE — PROGRESS NOTES
[facility]. Consulted w/attending possible d/c tomorrow d/t creatine level needs to improve.          Curley Sacks, JEAN MARIE

## 2022-03-10 NOTE — PROGRESS NOTES
Problem: Falls - Risk of  Goal: *Absence of Falls  Description: Document Neil Blight Fall Risk and appropriate interventions in the flowsheet. Outcome: Progressing Towards Goal  Note: Fall Risk Interventions:  Mobility Interventions: Bed/chair exit alarm    Mentation Interventions: Bed/chair exit alarm,Adequate sleep, hydration, pain control         Elimination Interventions: Call light in reach,Bed/chair exit alarm              Problem: Patient Education: Go to Patient Education Activity  Goal: Patient/Family Education  Outcome: Progressing Towards Goal     Problem: Pressure Injury - Risk of  Goal: *Prevention of pressure injury  Description: Document Deonte Scale and appropriate interventions in the flowsheet.   Outcome: Progressing Towards Goal  Note: Pressure Injury Interventions:  Sensory Interventions: Assess changes in LOC,Float heels,Discuss PT/OT consult with provider    Moisture Interventions: Absorbent underpads,Apply protective barrier, creams and emollients    Activity Interventions: Increase time out of bed    Mobility Interventions: Float heels,HOB 30 degrees or less    Nutrition Interventions: Document food/fluid/supplement intake    Friction and Shear Interventions: HOB 30 degrees or less                Problem: Patient Education: Go to Patient Education Activity  Goal: Patient/Family Education  Outcome: Progressing Towards Goal     Problem: Patient Education: Go to Patient Education Activity  Goal: Patient/Family Education  Outcome: Progressing Towards Goal     Problem: Patient Education: Go to Patient Education Activity  Goal: Patient/Family Education  Outcome: Progressing Towards Goal

## 2022-03-10 NOTE — PROGRESS NOTES
0830 pt assessed and am medications taken at this time. Pt encouraged to eat breakfast and was set up in bed to do so. Pt drank her ensure with her meds. Pt does not want to get out of bed at this time. No c/o pain.     Will CTM

## 2022-03-10 NOTE — PROGRESS NOTES
Spiritual Care Assessment/Progress Note  [facility]      NAME: Carlo Sanchez      MRN: [id number]  AGE: 80 y.o. SEX: female  Evangelical Affiliation: Ethanhovah witness   Language: English     3/10/2022     Total Time (in minutes): 10     Spiritual Assessment begun in [address] through conversation with:         [x]Patient        [] Family    [] Friend(s)        Reason for Consult: Initial visit     Spiritual beliefs: (Please include comment if needed)     [] Identifies with a latrice tradition:         [] Supported by a latrice community:            [] Claims no spiritual orientation:           [] Seeking spiritual identity:                [] Adheres to an individual form of spirituality:           [x] Not able to assess:                           Identified resources for coping:      [] Prayer                               [] Music                  [] Guided Imagery     [] Family/friends                 [] Pet visits     [] Devotional reading                         [x] Unknown     [] Other:                                               Interventions offered during this visit: (See comments for more details)    Patient Interventions: Initial visit (Silent support)           Plan of Care:     [] Support spiritual and/or cultural needs    [] Support AMD and/or advance care planning process      [] Support grieving process   [] Coordinate Rites and/or Rituals    [] Coordination with community clergy   [] No spiritual needs identified at this time   [] Detailed Plan of Care below (See Comments)  [] Make referral to Music Therapy  [] Make referral to Pet Therapy     [] Make referral to Addiction services  [] Make referral to [facility]  [] Make referral to Spiritual Care Partner  [] No future visits requested        [x] Contact Spiritual Care for further referrals     Comments:  Visit attempted for patient in the [address] [place] [address] for initial assessment.   Patient appeared to be sleeping and did not respond to greeting. There were no visitors present. Provided silent support by the door. Contact chaplains for further spiritual care and support.  María Flores M.Div.    can be reached by calling the  at [facility]  [phone]

## 2022-03-10 NOTE — PROGRESS NOTES
Notified Dr Marylene Helms of no lab orders put in, new order for ferritin level and iron studies, also discontinue fluids        Dr. Hankins Press noified of last hemoglobin being 8.9 on 3/7/22. Stat order for hct &hgb.

## 2022-03-11 LAB
BASOPHILS # BLD: 0 K/UL (ref 0–0.1)
BASOPHILS NFR BLD: 0 % (ref 0–1)
BNP SERPL-MCNC: 551 PG/ML
DIFFERENTIAL METHOD BLD: ABNORMAL
EOSINOPHIL # BLD: 0.6 K/UL (ref 0–0.4)
EOSINOPHIL NFR BLD: 5 % (ref 0–7)
ERYTHROCYTE [DISTWIDTH] IN BLOOD BY AUTOMATED COUNT: 24.8 % (ref 11.5–14.5)
FERRITIN SERPL-MCNC: 732 NG/ML (ref 8–252)
HCT VFR BLD AUTO: 24.5 % (ref 35–47)
HGB BLD-MCNC: 7.5 G/DL (ref 11.5–16)
IMM GRANULOCYTES # BLD AUTO: 0 K/UL
IMM GRANULOCYTES NFR BLD AUTO: 0 %
IRON SATN MFR SERPL: 29 % (ref 15–55)
IRON SATN MFR SERPL: 32 % (ref 20–50)
IRON SERPL-MCNC: 64 UG/DL (ref 35–150)
IRON,IRN: 42 UG/DL (ref 27–139)
LYMPHOCYTES # BLD: 2.1 K/UL (ref 0.8–3.5)
LYMPHOCYTES NFR BLD: 18 % (ref 12–49)
MCH RBC QN AUTO: 24.6 PG (ref 26–34)
MCHC RBC AUTO-ENTMCNC: 30.6 G/DL (ref 30–36.5)
MCV RBC AUTO: 80.3 FL (ref 80–99)
MONOCYTES # BLD: 0.8 K/UL (ref 0–1)
MONOCYTES NFR BLD: 7 % (ref 5–13)
MYELOCYTES NFR BLD MANUAL: 1 %
NEUTS SEG # BLD: 7.9 K/UL (ref 1.8–8)
NEUTS SEG NFR BLD: 69 % (ref 32–75)
NRBC # BLD: 0.05 K/UL (ref 0–0.01)
NRBC BLD-RTO: 0.4 PER 100 WBC
PLATELET # BLD AUTO: 290 K/UL (ref 150–400)
RBC # BLD AUTO: 3.05 M/UL (ref 3.8–5.2)
RBC MORPH BLD: ABNORMAL
TIBC SERPL-MCNC: 146 UG/DL (ref 250–450)
TIBC SERPL-MCNC: 197 UG/DL (ref 250–450)
UIBC SERPL-MCNC: 104 UG/DL (ref 118–369)
WBC # BLD AUTO: 11.4 K/UL (ref 3.6–11)

## 2022-03-11 PROCEDURE — 83880 ASSAY OF NATRIURETIC PEPTIDE: CPT

## 2022-03-11 PROCEDURE — 85025 COMPLETE CBC W/AUTO DIFF WBC: CPT

## 2022-03-11 PROCEDURE — 94760 N-INVAS EAR/PLS OXIMETRY 1: CPT

## 2022-03-11 PROCEDURE — 65270000029 HC RM PRIVATE

## 2022-03-11 PROCEDURE — 74011636637 HC RX REV CODE- 636/637: Performed by: INTERNAL MEDICINE

## 2022-03-11 PROCEDURE — 74011000258 HC RX REV CODE- 258: Performed by: INTERNAL MEDICINE

## 2022-03-11 PROCEDURE — 74011250637 HC RX REV CODE- 250/637: Performed by: INTERNAL MEDICINE

## 2022-03-11 PROCEDURE — 87086 URINE CULTURE/COLONY COUNT: CPT

## 2022-03-11 PROCEDURE — 74011250636 HC RX REV CODE- 250/636: Performed by: INTERNAL MEDICINE

## 2022-03-11 PROCEDURE — 36415 COLL VENOUS BLD VENIPUNCTURE: CPT

## 2022-03-11 PROCEDURE — 94640 AIRWAY INHALATION TREATMENT: CPT

## 2022-03-11 RX ORDER — SUCRALFATE 1 G/1
1 TABLET ORAL
Status: DISCONTINUED | OUTPATIENT
Start: 2022-03-11 | End: 2022-03-16 | Stop reason: HOSPADM

## 2022-03-11 RX ORDER — LANOLIN ALCOHOL/MO/W.PET/CERES
1 CREAM (GRAM) TOPICAL
Status: DISCONTINUED | OUTPATIENT
Start: 2022-03-12 | End: 2022-03-16 | Stop reason: HOSPADM

## 2022-03-11 RX ADMIN — SUCRALFATE 1 G: 1 TABLET ORAL at 21:36

## 2022-03-11 RX ADMIN — DICLOFENAC 2 G: 10 GEL TOPICAL at 09:00

## 2022-03-11 RX ADMIN — LEVOTHYROXINE SODIUM 100 MCG: 0.1 TABLET ORAL at 06:04

## 2022-03-11 RX ADMIN — PIPERACILLIN SODIUM AND TAZOBACTAM SODIUM 3.38 G: 3; .375 INJECTION, POWDER, LYOPHILIZED, FOR SOLUTION INTRAVENOUS at 11:05

## 2022-03-11 RX ADMIN — FLUTICASONE FUROATE, UMECLIDINIUM BROMIDE AND VILANTEROL TRIFENATATE 1 PUFF: 200; 62.5; 25 POWDER RESPIRATORY (INHALATION) at 08:41

## 2022-03-11 RX ADMIN — EPOETIN ALFA-EPBX 10900 UNITS: 10000 INJECTION, SOLUTION INTRAVENOUS; SUBCUTANEOUS at 18:38

## 2022-03-11 RX ADMIN — ATORVASTATIN CALCIUM 40 MG: 40 TABLET, FILM COATED ORAL at 18:38

## 2022-03-11 RX ADMIN — DICLOFENAC 2 G: 10 GEL TOPICAL at 20:27

## 2022-03-11 RX ADMIN — PREDNISONE 20 MG: 20 TABLET ORAL at 09:15

## 2022-03-11 RX ADMIN — PIPERACILLIN SODIUM AND TAZOBACTAM SODIUM 3.38 G: 3; .375 INJECTION, POWDER, LYOPHILIZED, FOR SOLUTION INTRAVENOUS at 22:30

## 2022-03-11 RX ADMIN — SUCRALFATE 1 G: 1 TABLET ORAL at 18:38

## 2022-03-11 RX ADMIN — SUCRALFATE 1 G: 1 TABLET ORAL at 11:05

## 2022-03-11 RX ADMIN — DICLOFENAC 2 G: 10 GEL TOPICAL at 13:00

## 2022-03-11 RX ADMIN — MIRTAZAPINE 45 MG: 30 TABLET, FILM COATED ORAL at 20:28

## 2022-03-11 RX ADMIN — DICLOFENAC 2 G: 10 GEL TOPICAL at 18:00

## 2022-03-11 RX ADMIN — PANTOPRAZOLE SODIUM 40 MG: 40 TABLET, DELAYED RELEASE ORAL at 09:15

## 2022-03-11 RX ADMIN — MONTELUKAST 10 MG: 10 TABLET, FILM COATED ORAL at 09:15

## 2022-03-11 RX ADMIN — HEPARIN SODIUM 5000 UNITS: 5000 INJECTION INTRAVENOUS; SUBCUTANEOUS at 06:04

## 2022-03-11 NOTE — PROGRESS NOTES
Consulted w/attending and informed  Her Hgb has dropped; and consult placed for GI to see her. HH accepted via [facility]. SOC x 24-48 hours AFTER discharge.         JEAN MARIE Esparza

## 2022-03-11 NOTE — CONSULTS
Gastroenterology Consult     Referring Physician: Mari Blake MD     Consult Date: 3/11/2022     Subjective:     Chief Complaint: Weakness, fatigue    History of Present Illness: Lul Diana is a 80 y.o. female who is seen in consultation for Anemia. Ms. Fátima Mcpherson states she has been feeling tired and weak for about 3 days but denies nausea, vomiting, diarrhea, or constipation. She reports she has not noticed black tarry stools or bright red rectal bleeding. She states her appetite has decreased but she denies un-intentional weight loss or abdominal pain. Stool for Occult blood pending. She does not accept Blood. She was admitted on 3/7/22. She does report she was having dark colored urine. CT scan of abdomen and pelvis on 3/7 shows cholelithiasis but no evidence of acute cholecystitis. Non obstructing renal stones, no ureteral or bladder stones, no hydronephrosis. No focal hepatic lesion identified, there is a calcified stone in the gallbladder, no biliary duct dilation. There is no bowel obstruction. Ms. Fátima Mcpherson hgB was 8.9 on admission and has declined to 7.5 this am. 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 She does have a past medical history significant for hypertension, chronic anemia, asthma, thyroid disease, and dyslipidemia. Plan EGD on Monday if hgB continues to decrease. Iron Panel, ferritin pending. CT abdomen/pelvis 3/7/22: IMPRESSION  1. Cholelithiasis, but no CT evidence of acute cholecystitis. 2. Small bilateral pleural effusions with basilar atelectasis. 3. Atherosclerotic disease. 4. Nonobstructing renal stones. No ureteral or bladder stone. No hydronephrosis. 5. Please see above report for further details.     Past Medical History:   Diagnosis Date    Asthma     Bereavement 3/30/16    65 yo con  - multiple myleoma     Cholelithiasis     Cough     Dyslipidemia     Hip pain, chronic, left 2017    Hypertension     Low back pain 16    Osteoarthritis     Osteoporosis 2-18-16    Prediabetes     S/P colonoscopy 7/15    [facility]    Trigger finger, right 02/14/2017    Wedge compression fx of second thoracic vertebra with routine healing 4/25/16    t11     Past Surgical History:   Procedure Laterality Date    HX COLONOSCOPY      HX GYN        Family History   Family history unknown: Yes   Problem Relation Age of Onset    Family history unknown: Yes    Cancer Son      Social History     Tobacco Use    Smoking status: Never Smoker    Smokeless tobacco: Never Used   Substance Use Topics    Alcohol use: Not Currently      Allergies   Allergen Reactions    Crestor [Rosuvastatin] Swelling     Current Facility-Administered Medications   Medication Dose Route Frequency    sucralfate (CARAFATE) tablet 1 g  1 g Oral AC&HS    piperacillin-tazobactam (ZOSYN) 3.375 g in 0.9% sodium chloride (MBP/ADV) 100 mL MBP  3.375 g IntraVENous Q12H    atorvastatin (LIPITOR) tablet 40 mg  40 mg Oral QPM    diclofenac (VOLTAREN) 1 % topical gel 2 g  2 g Topical QID    levothyroxine (SYNTHROID) tablet 100 mcg  100 mcg Oral 6am    mirtazapine (REMERON) tablet 45 mg  45 mg Oral QHS    montelukast (SINGULAIR) tablet 10 mg  10 mg Oral DAILY    pantoprazole (PROTONIX) tablet 40 mg  40 mg Oral DAILY    sodium chloride (NS) flush 5-40 mL  5-40 mL IntraVENous PRN    fluticasone-umeclidin-vilanter (TRELEGY ELLIPTA) inhaler 1 Puff  1 Puff Inhalation DAILY    benzonatate (TESSALON) capsule 100 mg  100 mg Oral TID PRN    albuterol CONCENTRATE 2.5mg/0.5 mL neb soln  2.5 mg Nebulization Q4H PRN        Review of Systems:  A detailed 10 organ review of systems is obtained with pertinent positives as listed in the History of Present Illness and Past Medical History. All others are negative.     Objective:     Physical Exam:  Visit Vitals  BP (!) 94/54 (BP 1 Location: Left upper arm, BP Patient Position: At rest)   Pulse 85   Temp 98.2 °F (36.8 °C)   Resp 18   Ht 5' 4\" (1.626 m) Wt 54.4 kg (120 lb)   SpO2 98%   BMI 20.60 kg/m²        Skin:  Extremities and face reveal no rashes. No barrera erythema. No telangiectasias on the chest wall. HEENT: Sclerae anicteric. Extra-occular muscles are intact. No oral ulcers. No abnormal pigmentation of the lips. The neck is supple. Cardiovascular: Regular rate and rhythm. Respiratory:  Comfortable breathing with no accessory muscle use. GI:  Abdomen nondistended, soft, and nontender. Normal active bowel sounds. No enlargement of the liver or spleen. No masses palpable. Rectal:  Deferred  Musculoskeletal: Generalized Weakness  Neurological:  Gross memory appears intact. Patient is alert and oriented. Psychiatric:  Mood appears appropriate with judgement intact. Lymphatic:  No cervical or supraclavicular adenopathy. Lab/Data Review:  BMP: No results found for: NA, K, CL, CO2, AGAP, GLU, BUN, CREA, GFRAA, GFRNA  CMP: No results found for: NA, K, CL, CO2, AGAP, GLU, BUN, CREA, GFRAA, GFRNA, CA, MG, PHOS, ALB, TBIL, TP, ALB, GLOB, AGRAT, ALT  CBC:   Lab Results   Component Value Date/Time    WBC 11.4 (H) 03/11/2022 10:00 AM    HGB 7.5 (L) 03/11/2022 10:00 AM    HCT 24.5 (L) 03/11/2022 10:00 AM     03/11/2022 10:00 AM         Assessment/Plan:     1. Anemia     Monitor H&H    Pantoprazole 40 mg daily    Iron Panel in the am    Ferritin in the am    Patient does not accept blood     Ferrous sulfate 325 mg daily    EGD on Monday if hgB continues to decline  2. Hyperlipidemia    Continue Lipitor   3. Asthma     Continue Singulair      Continue Trelegy  4. GERD     Sucralfate QID  5. Leukocytosis (trending down)     Continue IV Zosyn     UA shows Negative bacteria, Negative nitrites,     Urine culture pending  6. JORDANA     Nephrology input appreciated       Thank you for allowing me to participate in this patients care. Plan discussed with Dr. Brittany Arnold and he approves.

## 2022-03-11 NOTE — PROGRESS NOTES
Consult Date: 3/11/2022    Subjective   HISTORY OF PRESENTING ILLNESS     Seen and examined. Reports that she is feeling better. Continuing to eat and drink well. Mostly in bed still    Past Medical History:   Diagnosis Date    Asthma     Bereavement 3/30/16    63 yo con  - multiple myleoma     Cholelithiasis     Cough     Dyslipidemia     Hip pain, chronic, left 2017    Hypertension     Low back pain 16    Osteoarthritis     Osteoporosis 16    Prediabetes     S/P colonoscopy 7/15    [facility]    Trigger finger, right 2017    Wedge compression fx of second thoracic vertebra with routine healing 16    t11      Past Surgical History:   Procedure Laterality Date    HX COLONOSCOPY      HX GYN       Family History   Family history unknown: Yes   Problem Relation Age of Onset    Family history unknown:  Yes    Cancer Son       Social History     Tobacco Use    Smoking status: Never Smoker    Smokeless tobacco: Never Used   Substance Use Topics    Alcohol use: Not Currently       Current Facility-Administered Medications   Medication Dose Route Frequency Provider Last Rate Last Admin    sucralfate (CARAFATE) tablet 1 g  1 g Oral AC&HS Nora Pond MD   1 g at 22 1105    piperacillin-tazobactam (ZOSYN) 3.375 g in 0.9% sodium chloride (MBP/ADV) 100 mL MBP  3.375 g IntraVENous Q12H Audrey RIVERA MD 25 mL/hr at 22 1105 3.375 g at 22 1105    atorvastatin (LIPITOR) tablet 40 mg  40 mg Oral QPM Audrey RIVERA MD   40 mg at 03/10/22 1801    diclofenac (VOLTAREN) 1 % topical gel 2 g  2 g Topical QID Nora Pond MD   2 g at 22 0900    levothyroxine (SYNTHROID) tablet 100 mcg  100 mcg Oral Paula RIVERA MD   100 mcg at 22 0604    mirtazapine (REMERON) tablet 45 mg  45 mg Oral QHS Audrey RIVERA MD   45 mg at 03/10/22 2030    montelukast (SINGULAIR) tablet 10 mg  10 mg Oral DAILY Nora Pond MD   10 mg at 03/11/22 0915    pantoprazole (PROTONIX) tablet 40 mg  40 mg Oral DAILY [place] RIVERA MD   40 mg at 03/11/22 0915    sodium chloride (NS) flush 5-40 mL  5-40 mL IntraVENous PRN Keyon Thomason MD        fluticasone-umeclidin-vilanter (TRELEGY ELLIPTA) inhaler 1 Puff  1 Puff Inhalation DAILY Keyon Thomason MD   1 Puff at 03/11/22 0841    benzonatate (TESSALON) capsule 100 mg  100 mg Oral TID PRN [place] RIVERA MD        albuterol CONCENTRATE 2.5mg/0.5 mL neb soln  2.5 mg Nebulization Q4H PRN Keyon Thomason MD            Review of Systems   Constitutional: Positive for fatigue. Negative for activity change, appetite change, fever and unexpected weight change. HENT: Negative for congestion, facial swelling, postnasal drip, sinus pressure and trouble swallowing. Eyes: Negative for discharge, redness and visual disturbance. Respiratory: Negative for cough, chest tightness, shortness of breath and wheezing. Cardiovascular: Negative for chest pain, palpitations and leg swelling. Gastrointestinal: Negative for abdominal distention, constipation, diarrhea, nausea and vomiting. Endocrine: Negative for cold intolerance, heat intolerance and polyuria. Genitourinary: Negative for decreased urine volume, difficulty urinating and hematuria. Musculoskeletal: Negative for arthralgias, back pain, gait problem, joint swelling and myalgias. Skin: Negative for color change and pallor. Allergic/Immunologic: Negative for environmental allergies and food allergies. Neurological: Negative for dizziness, tremors, seizures, facial asymmetry and headaches. Hematological: Negative for adenopathy. Does not bruise/bleed easily. Psychiatric/Behavioral: Negative for agitation, behavioral problems and confusion.        Objective     Vital signs for last 24 hours:  Visit Vitals  /85 (BP 1 Location: Left upper arm, BP Patient Position: At rest)   Pulse 87   Temp 97.4 °F (36.3 °C)   Resp 18   Ht 5' 4\" (1.626 m)   Wt 54.4 kg (120 lb)   SpO2 98%   BMI 20.60 kg/m²           Recent Results (from the past 24 hour(s))   HGB & HCT    Collection Time: 03/10/22  8:15 PM   Result Value Ref Range    HGB 7.3 (L) 11.5 - 16.0 g/dL    HCT 23.2 (L) 35.0 - 47.0 %   CBC WITH AUTOMATED DIFF    Collection Time: 03/11/22 10:00 AM   Result Value Ref Range    WBC 11.4 (H) 3.6 - 11.0 K/uL    RBC 3.05 (L) 3.80 - 5.20 M/uL    HGB 7.5 (L) 11.5 - 16.0 g/dL    HCT 24.5 (L) 35.0 - 47.0 %    MCV 80.3 80.0 - 99.0 FL    MCH 24.6 (L) 26.0 - 34.0 PG    MCHC 30.6 30.0 - 36.5 g/dL    RDW 24.8 (H) 11.5 - 14.5 %    PLATELET 567 113 - 910 K/uL    NRBC 0.4 (H) 0.0  WBC    ABSOLUTE NRBC 0.05 (H) 0.00 - 0.01 K/uL    NEUTROPHILS PENDING %    LYMPHOCYTES PENDING %    MONOCYTES PENDING %    EOSINOPHILS PENDING %    BASOPHILS PENDING %    IMMATURE GRANULOCYTES PENDING %    ABS. NEUTROPHILS PENDING K/UL    ABS. LYMPHOCYTES PENDING K/UL    ABS. MONOCYTES PENDING K/UL    ABS. EOSINOPHILS PENDING K/UL    ABS. BASOPHILS PENDING K/UL    ABS. IMM. GRANS. PENDING K/UL    DF PENDING           Intake/Output Summary (Last 24 hours) at 3/11/2022 1142  Last data filed at 3/11/2022 0600  Gross per 24 hour   Intake 100 ml   Output 150 ml   Net -50 ml      Current Shift: No intake/output data recorded. Last 3 Shifts: 03/09 1901 - 03/11 0700  In: 100 [I.V.:100]  Out: 1150 [Urine:1150]  Physical Exam  Vitals and nursing note reviewed. Constitutional:       Appearance: Normal appearance. She is normal weight. HENT:      Head: Normocephalic and atraumatic. Nose: Nose normal.      Mouth/Throat:      Mouth: Mucous membranes are moist.      Pharynx: Oropharynx is clear. Eyes:      Conjunctiva/sclera: Conjunctivae normal.   Cardiovascular:      Rate and Rhythm: Regular rhythm. Tachycardia present. Pulses: Normal pulses. Heart sounds: Normal heart sounds. Pulmonary:      Effort: Pulmonary effort is normal.      Breath sounds: Normal breath sounds. Abdominal:      General: Abdomen is flat. Palpations: Abdomen is soft. Skin:     General: Skin is warm and dry. Coloration: Skin is not pale. Findings: No erythema. Neurological:      General: No focal deficit present. Mental Status: She is alert and oriented to person, place, and time. Psychiatric:         Mood and Affect: Mood normal.         Behavior: Behavior normal.          Data Review:   Recent Results (from the past 24 hour(s))   HGB & HCT    Collection Time: 03/10/22  8:15 PM   Result Value Ref Range    HGB 7.3 (L) 11.5 - 16.0 g/dL    HCT 23.2 (L) 35.0 - 47.0 %   CBC WITH AUTOMATED DIFF    Collection Time: 03/11/22 10:00 AM   Result Value Ref Range    WBC 11.4 (H) 3.6 - 11.0 K/uL    RBC 3.05 (L) 3.80 - 5.20 M/uL    HGB 7.5 (L) 11.5 - 16.0 g/dL    HCT 24.5 (L) 35.0 - 47.0 %    MCV 80.3 80.0 - 99.0 FL    MCH 24.6 (L) 26.0 - 34.0 PG    MCHC 30.6 30.0 - 36.5 g/dL    RDW 24.8 (H) 11.5 - 14.5 %    PLATELET 823 437 - 746 K/uL    NRBC 0.4 (H) 0.0  WBC    ABSOLUTE NRBC 0.05 (H) 0.00 - 0.01 K/uL    NEUTROPHILS PENDING %    LYMPHOCYTES PENDING %    MONOCYTES PENDING %    EOSINOPHILS PENDING %    BASOPHILS PENDING %    IMMATURE GRANULOCYTES PENDING %    ABS. NEUTROPHILS PENDING K/UL    ABS. LYMPHOCYTES PENDING K/UL    ABS. MONOCYTES PENDING K/UL    ABS. EOSINOPHILS PENDING K/UL    ABS. BASOPHILS PENDING K/UL    ABS. IMM. GRANS. PENDING K/UL    DF PENDING          US RETROPERITONEUM COMP   Final Result   Medical renal disease      CT ABD PELV WO CONT   Final Result   1. Cholelithiasis, but no CT evidence of acute cholecystitis. 2. Small bilateral pleural effusions with basilar atelectasis. 3. Atherosclerotic disease. 4. Nonobstructing renal stones. No ureteral or bladder stone. No hydronephrosis. 5. Please see above report for further details. XR CHEST PORT   Final Result   No acute pulmonary process.               Patient Active Problem List   Diagnosis Code    Hypertension I10    Asthma J45.909    S/P colonoscopy Z98.890    Low back pain M54.50    ACP (advance care planning) Z71.89    Osteoporosis M81.0    Cough R05.9    Cholelithiasis K80.20    Dyslipidemia E78.5    Prediabetes R73.03    Osteoarthritis M19.90    Trigger finger, right M65.30    Hip pain, chronic, left M25.552, G89.29    Dehydration E86.0    JORDANA (acute kidney injury) ([address] [address] N17.9        DIAGNOSES:  1. 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 CKD baseline not known  3. History of hypertension  4. Chronic anemia  5. Sepsis/leukocytosissource not clear  6. Hypomagnesemiamild  7. Metabolic acidosis, mild  8. Hyperkalemia, mild  DISCUSSION:   Continued improvement of renal function   Discussed with patient that her renal function has returned to what seems to be her baseline, she is cleared from a nephrology perspective   Hgb at 7.5      PLAN:  Continue to monitor Hgb levels      Thanks for consulting me. Please don't hesitate to contact me if any questions arise of if I can assist in any manner. This dictation was done by dragon, computer voice recognition software. Often unanticipated grammatical, syntax, phones and other interpretive errors are inadvertently transcribed. Please excuse errors that have escaped final proofreading. Please contact me if you suspect dictation or transcription errors.   Dr Forrest Galicia  [address] [place] [address], [address] [facility], [facility]  Cell Phone: [phone]  Office phone: [phone]  Fax: [phone]

## 2022-03-11 NOTE — PROGRESS NOTES
Progress Note    Patient: J Carlos Cardozo MRN: [id number]  SSN: [id number]    YOB: 1933  Age: 80 y.o. Sex: female      Admit Date: 3/7/2022    LOS: 4 days     Subjective:     80years old with drop in hemoglobin has been on prednisone for this severe asthma denies any abdominal pain GI saw patient recommends upper endoscopy on Monday    Objective:     Vitals:    03/11/22 0656 03/11/22 0842 03/11/22 1108 03/11/22 1449   BP: 136/85   (!) 94/54   Pulse: 87   85   Resp: 18   18   Temp: 97.4 °F (36.3 °C)   98.2 °F (36.8 °C)   SpO2: 98% 97% 98% 98%   Weight:       Height:            Intake and Output:  Current Shift: No intake/output data recorded. Last three shifts: 03/09 1901 - 03/11 0700  In: 100 [I.V.:100]  Out: 1150 [Urine:1150]    Physical Exam:   General:  Alert, cooperative, no distress, appears stated age. Eyes:  Conjunctivae/corneas clear. PERRL, EOMs intact. Fundi benign   Ears:  Normal TMs and external ear canals both ears. Nose: Nares normal. Septum midline. Mucosa normal. No drainage or sinus tenderness. Mouth/Throat: Lips, mucosa, and tongue normal. Teeth and gums normal.   Neck: Supple, symmetrical, trachea midline, no adenopathy, thyroid: no enlargment/tenderness/nodules, no carotid bruit and no JVD. Back:   Symmetric, no curvature. ROM normal. No CVA tenderness. Lungs:   Clear to auscultation bilaterally. Heart:  Regular rate and rhythm, S1, S2 normal, no murmur, click, rub or gallop. Abdomen:   Soft, non-tender. Bowel sounds normal. No masses,  No organomegaly. Extremities: Extremities normal, atraumatic, no cyanosis or edema. Pulses: 2+ and symmetric all extremities. Skin: Skin color, texture, turgor normal. No rashes or lesions   Lymph nodes: Cervical, supraclavicular, and axillary nodes normal.   Neurologic: CNII-XII intact. Normal strength, sensation and reflexes throughout. Lab/Data Review: All lab results for the last 24 hours reviewed.      Recent Results (from the past 24 hour(s))   FERRITIN    Collection Time: 03/10/22  8:15 PM   Result Value Ref Range    Ferritin 732 (H) 8 - 252 ng/mL   IRON PROFILE    Collection Time: 03/10/22  8:15 PM   Result Value Ref Range    Iron 64 35 - 150 ug/dL    TIBC 197 (L) 250 - 450 ug/dL    Iron % saturation 32 20 - 50 %   HGB & HCT    Collection Time: 03/10/22  8:15 PM   Result Value Ref Range    HGB 7.3 (L) 11.5 - 16.0 g/dL    HCT 23.2 (L) 35.0 - 47.0 %   CBC WITH AUTOMATED DIFF    Collection Time: 03/11/22 10:00 AM   Result Value Ref Range    WBC 11.4 (H) 3.6 - 11.0 K/uL    RBC 3.05 (L) 3.80 - 5.20 M/uL    HGB 7.5 (L) 11.5 - 16.0 g/dL    HCT 24.5 (L) 35.0 - 47.0 %    MCV 80.3 80.0 - 99.0 FL    MCH 24.6 (L) 26.0 - 34.0 PG    MCHC 30.6 30.0 - 36.5 g/dL    RDW 24.8 (H) 11.5 - 14.5 %    PLATELET 857 630 - 454 K/uL    NRBC 0.4 (H) 0.0  WBC    ABSOLUTE NRBC 0.05 (H) 0.00 - 0.01 K/uL    NEUTROPHILS 69 32 - 75 %    LYMPHOCYTES 18 12 - 49 %    MONOCYTES 7 5 - 13 %    EOSINOPHILS 5 0 - 7 %    BASOPHILS 0 0 - 1 %    MYELOCYTES 1 (H) 0 %    IMMATURE GRANULOCYTES 0 %    ABS. NEUTROPHILS 7.9 1.8 - 8.0 K/UL    ABS. LYMPHOCYTES 2.1 0.8 - 3.5 K/UL    ABS. MONOCYTES 0.8 0.0 - 1.0 K/UL    ABS. EOSINOPHILS 0.6 (H) 0.0 - 0.4 K/UL    ABS. BASOPHILS 0.0 0.0 - 0.1 K/UL    ABS. IMM.  GRANS. 0.0 K/UL    DF Manual      RBC COMMENTS Anisocytosis  2+        RBC COMMENTS Macrocytosis  1+        RBC COMMENTS Microcytosis  2+        RBC COMMENTS Ovalocytes  3+        RBC COMMENTS Teardrop cells  1+       NT-PRO BNP    Collection Time: 03/11/22 10:00 AM   Result Value Ref Range    NT pro- (H) <450 pg/mL         Assessment:     Active Problems:    Dehydration (3/7/2022)      JORDANA (acute kidney injury) ([facility] [address] (3/7/2022)    Acute anemia  Possible gastritis  Chronic asthma with acute extubation on longstanding prednisone    Plan:     Continue present treatment  PT and OT    Signed By: Jovon Blake MD     March 11, 2022

## 2022-03-11 NOTE — PROGRESS NOTES
Consult Date: 3/11/2022    Subjective   HISTORY OF PRESENTING ILLNESS     Seen and examined. Reports that she is feeling well. Eating better and drinking better. Mostly in bed still    Past Medical History:   Diagnosis Date    Asthma     Bereavement 3/30/16    63 yo con  - multiple myleoma     Cholelithiasis     Cough     Dyslipidemia     Hip pain, chronic, left 2017    Hypertension     Low back pain 16    Osteoarthritis     Osteoporosis 16    Prediabetes     S/P colonoscopy 7/15    [facility]    Trigger finger, right 2017    Wedge compression fx of second thoracic vertebra with routine healing 16    t11      Past Surgical History:   Procedure Laterality Date    HX COLONOSCOPY      HX GYN       Family History   Family history unknown: Yes   Problem Relation Age of Onset    Family history unknown:  Yes    Cancer Son       Social History     Tobacco Use    Smoking status: Never Smoker    Smokeless tobacco: Never Used   Substance Use Topics    Alcohol use: Not Currently       Current Facility-Administered Medications   Medication Dose Route Frequency Provider Last Rate Last Admin    sucralfate (CARAFATE) tablet 1 g  1 g Oral AC&HS Ayla Morrison MD   1 g at 22 1105    [START ON 3/12/2022] ferrous sulfate tablet 325 mg  1 Tablet Oral DAILY WITH BREAKFAST Bartolo Bauer NP        piperacillin-tazobactam (ZOSYN) 3.375 g in 0.9% sodium chloride (MBP/ADV) 100 mL MBP  3.375 g IntraVENous Q12H Shira RIVERA MD 25 mL/hr at 22 1105 3.375 g at 22 1105    atorvastatin (LIPITOR) tablet 40 mg  40 mg Oral QPM Shira RIVERA MD   40 mg at 03/10/22 1801    diclofenac (VOLTAREN) 1 % topical gel 2 g  2 g Topical QID Shira RIVERA MD   2 g at 22 1300    levothyroxine (SYNTHROID) tablet 100 mcg  100 mcg Oral Marilin RIVERA MD   100 mcg at 22 0604    mirtazapine (REMERON) tablet 45 mg  45 mg Oral QHS Ayla Morrison MD   45 mg at 03/10/22 2030    montelukast (SINGULAIR) tablet 10 mg  10 mg Oral DAILY Hakeem RIVERA MD   10 mg at 03/11/22 0915    pantoprazole (PROTONIX) tablet 40 mg  40 mg Oral DAILY Hakeem RIVERA MD   40 mg at 03/11/22 0915    sodium chloride (NS) flush 5-40 mL  5-40 mL IntraVENous PRN Savita Saha MD        fluticasone-umeclidin-vilanter (TRELEGY ELLIPTA) inhaler 1 Puff  1 Puff Inhalation DAILY Savita Saha MD   1 Puff at 03/11/22 0841    benzonatate (TESSALON) capsule 100 mg  100 mg Oral TID PRN Hakeem RIVERA MD        albuterol CONCENTRATE 2.5mg/0.5 mL neb soln  2.5 mg Nebulization Q4H PRN Savita Saha MD            Review of Systems   Constitutional: Positive for fatigue. Negative for activity change, appetite change, fever and unexpected weight change. HENT: Negative for congestion, facial swelling, postnasal drip, sinus pressure and trouble swallowing. Eyes: Negative for discharge, redness and visual disturbance. Respiratory: Negative for cough, chest tightness, shortness of breath and wheezing. Cardiovascular: Negative for chest pain, palpitations and leg swelling. Gastrointestinal: Negative for abdominal distention, constipation, diarrhea, nausea and vomiting. Endocrine: Negative for cold intolerance, heat intolerance and polyuria. Genitourinary: Negative for decreased urine volume, difficulty urinating and hematuria. Musculoskeletal: Negative for arthralgias, back pain, gait problem, joint swelling and myalgias. Skin: Negative for color change and pallor. Allergic/Immunologic: Negative for environmental allergies and food allergies. Neurological: Negative for dizziness, tremors, seizures, facial asymmetry and headaches. Hematological: Negative for adenopathy. Does not bruise/bleed easily. Psychiatric/Behavioral: Negative for agitation, behavioral problems and confusion.        Objective     Vital signs for last 24 hours:  Visit Vitals  BP (!) 94/54 (BP 1 Location: Left upper arm, BP Patient Position: At rest)   Pulse 85   Temp 98.2 °F (36.8 °C)   Resp 18   Ht 5' 4\" (1.626 m)   Wt 54.4 kg (120 lb)   SpO2 98%   BMI 20.60 kg/m²           Recent Results (from the past 24 hour(s))   FERRITIN    Collection Time: 03/10/22  8:15 PM   Result Value Ref Range    Ferritin 732 (H) 8 - 252 ng/mL   IRON PROFILE    Collection Time: 03/10/22  8:15 PM   Result Value Ref Range    Iron 64 35 - 150 ug/dL    TIBC 197 (L) 250 - 450 ug/dL    Iron % saturation 32 20 - 50 %   HGB & HCT    Collection Time: 03/10/22  8:15 PM   Result Value Ref Range    HGB 7.3 (L) 11.5 - 16.0 g/dL    HCT 23.2 (L) 35.0 - 47.0 %   CBC WITH AUTOMATED DIFF    Collection Time: 03/11/22 10:00 AM   Result Value Ref Range    WBC 11.4 (H) 3.6 - 11.0 K/uL    RBC 3.05 (L) 3.80 - 5.20 M/uL    HGB 7.5 (L) 11.5 - 16.0 g/dL    HCT 24.5 (L) 35.0 - 47.0 %    MCV 80.3 80.0 - 99.0 FL    MCH 24.6 (L) 26.0 - 34.0 PG    MCHC 30.6 30.0 - 36.5 g/dL    RDW 24.8 (H) 11.5 - 14.5 %    PLATELET 327 312 - 417 K/uL    NRBC 0.4 (H) 0.0  WBC    ABSOLUTE NRBC 0.05 (H) 0.00 - 0.01 K/uL    NEUTROPHILS 69 32 - 75 %    LYMPHOCYTES 18 12 - 49 %    MONOCYTES 7 5 - 13 %    EOSINOPHILS 5 0 - 7 %    BASOPHILS 0 0 - 1 %    MYELOCYTES 1 (H) 0 %    IMMATURE GRANULOCYTES 0 %    ABS. NEUTROPHILS 7.9 1.8 - 8.0 K/UL    ABS. LYMPHOCYTES 2.1 0.8 - 3.5 K/UL    ABS. MONOCYTES 0.8 0.0 - 1.0 K/UL    ABS. EOSINOPHILS 0.6 (H) 0.0 - 0.4 K/UL    ABS. BASOPHILS 0.0 0.0 - 0.1 K/UL    ABS. IMM.  GRANS. 0.0 K/UL    DF Manual      RBC COMMENTS Anisocytosis  2+        RBC COMMENTS Macrocytosis  1+        RBC COMMENTS Microcytosis  2+        RBC COMMENTS Ovalocytes  3+        RBC COMMENTS Teardrop cells  1+       NT-PRO BNP    Collection Time: 03/11/22 10:00 AM   Result Value Ref Range    NT pro- (H) <450 pg/mL          Intake/Output Summary (Last 24 hours) at 3/11/2022 1629  Last data filed at 3/11/2022 0600  Gross per 24 hour   Intake 100 ml   Output 150 ml Net -50 ml      Current Shift: No intake/output data recorded. Last 3 Shifts: 03/09 1901 - 03/11 0700  In: 100 [I.V.:100]  Out: 1150 [Urine:1150]  Physical Exam  Vitals and nursing note reviewed. Constitutional:       Appearance: Normal appearance. She is normal weight. HENT:      Head: Normocephalic and atraumatic. Nose: Nose normal.      Mouth/Throat:      Mouth: Mucous membranes are moist.      Pharynx: Oropharynx is clear. Eyes:      Conjunctiva/sclera: Conjunctivae normal.   Cardiovascular:      Rate and Rhythm: Regular rhythm. Tachycardia present. Pulses: Normal pulses. Heart sounds: Normal heart sounds. Pulmonary:      Effort: Pulmonary effort is normal.      Breath sounds: Normal breath sounds. Abdominal:      General: Abdomen is flat. Palpations: Abdomen is soft. Skin:     General: Skin is warm and dry. Coloration: Skin is not pale. Findings: No erythema. Neurological:      General: No focal deficit present. Mental Status: She is alert and oriented to person, place, and time.    Psychiatric:         Mood and Affect: Mood normal.         Behavior: Behavior normal.          Data Review:   Recent Results (from the past 24 hour(s))   FERRITIN    Collection Time: 03/10/22  8:15 PM   Result Value Ref Range    Ferritin 732 (H) 8 - 252 ng/mL   IRON PROFILE    Collection Time: 03/10/22  8:15 PM   Result Value Ref Range    Iron 64 35 - 150 ug/dL    TIBC 197 (L) 250 - 450 ug/dL    Iron % saturation 32 20 - 50 %   HGB & HCT    Collection Time: 03/10/22  8:15 PM   Result Value Ref Range    HGB 7.3 (L) 11.5 - 16.0 g/dL    HCT 23.2 (L) 35.0 - 47.0 %   CBC WITH AUTOMATED DIFF    Collection Time: 03/11/22 10:00 AM   Result Value Ref Range    WBC 11.4 (H) 3.6 - 11.0 K/uL    RBC 3.05 (L) 3.80 - 5.20 M/uL    HGB 7.5 (L) 11.5 - 16.0 g/dL    HCT 24.5 (L) 35.0 - 47.0 %    MCV 80.3 80.0 - 99.0 FL    MCH 24.6 (L) 26.0 - 34.0 PG    MCHC 30.6 30.0 - 36.5 g/dL    RDW 24.8 (H) 11.5 - 14.5 %    PLATELET 564 440 - 920 K/uL    NRBC 0.4 (H) 0.0  WBC    ABSOLUTE NRBC 0.05 (H) 0.00 - 0.01 K/uL    NEUTROPHILS 69 32 - 75 %    LYMPHOCYTES 18 12 - 49 %    MONOCYTES 7 5 - 13 %    EOSINOPHILS 5 0 - 7 %    BASOPHILS 0 0 - 1 %    MYELOCYTES 1 (H) 0 %    IMMATURE GRANULOCYTES 0 %    ABS. NEUTROPHILS 7.9 1.8 - 8.0 K/UL    ABS. LYMPHOCYTES 2.1 0.8 - 3.5 K/UL    ABS. MONOCYTES 0.8 0.0 - 1.0 K/UL    ABS. EOSINOPHILS 0.6 (H) 0.0 - 0.4 K/UL    ABS. BASOPHILS 0.0 0.0 - 0.1 K/UL    ABS. IMM. GRANS. 0.0 K/UL    DF Manual      RBC COMMENTS Anisocytosis  2+        RBC COMMENTS Macrocytosis  1+        RBC COMMENTS Microcytosis  2+        RBC COMMENTS Ovalocytes  3+        RBC COMMENTS Teardrop cells  1+       NT-PRO BNP    Collection Time: 03/11/22 10:00 AM   Result Value Ref Range    NT pro- (H) <450 pg/mL         US RETROPERITONEUM COMP   Final Result   Medical renal disease      CT ABD PELV WO CONT   Final Result   1. Cholelithiasis, but no CT evidence of acute cholecystitis. 2. Small bilateral pleural effusions with basilar atelectasis. 3. Atherosclerotic disease. 4. Nonobstructing renal stones. No ureteral or bladder stone. No hydronephrosis. 5. Please see above report for further details. XR CHEST PORT   Final Result   No acute pulmonary process. Patient Active Problem List   Diagnosis Code    Hypertension I10    Asthma J45.909    S/P colonoscopy Z98.890    Low back pain M54.50    ACP (advance care planning) Z71.89    Osteoporosis M81.0    Cough R05.9    Cholelithiasis K80.20    Dyslipidemia E78.5    Prediabetes R73.03    Osteoarthritis M19.90    Trigger finger, right M65.30    Hip pain, chronic, left M25.552, G89.29    Dehydration E86.0    JORDANA (acute kidney injury) ([facility] [address] N17.9        DIAGNOSES:  1. Acute injury, grade 2 on CKD- resolved. 2. CKD baseline not known  3. History of hypertension  4. Chronic anemia  5.  Sepsis/leukocytosissource not clear  6. Hypomagnesemiamild  7. Metabolic acidosis, mild  8. Hyperkalemia, mild  DISCUSSION:   Anemia of chronic illness noted.  Renal ultrasound findings of 8.1 cm kidney/8.6 cm kidney suggest chronicity.  Most likely cause of CKD  is hypertensive nephrosclerosis but could also be chronic NSAID use    PLAN:  Procrit to be started at 200 U/Kg weekly   Goal hb 1 mg percent rise in 2-3 weeks. Thanks for consulting me. Please don't hesitate to contact me if any questions arise of if I can assist in any manner. This dictation was done by dragon, computer voice recognition software. Often unanticipated grammatical, syntax, phones and other interpretive errors are inadvertently transcribed. Please excuse errors that have escaped final proofreading. Please contact me if you suspect dictation or transcription errors.   Dr Kevin Benoit  [address], [address] [facility], [address] [facility]  Cell Phone: [phone]  Office phone: [phone]  Fax: [phone]

## 2022-03-11 NOTE — PROGRESS NOTES
Notified  of hemoglobin 7.3, new orders obtained, obtain UA, BNP, and discontinue IM heparin, begin sequentials to bilateral legs as tolerated.       ** BMP

## 2022-03-12 LAB
ANION GAP SERPL CALC-SCNC: 6 MMOL/L (ref 5–15)
BASOPHILS # BLD: 0 K/UL (ref 0–0.1)
BASOPHILS NFR BLD: 0 % (ref 0–1)
BUN SERPL-MCNC: 46 MG/DL (ref 6–20)
BUN/CREAT SERPL: 29 (ref 12–20)
CA-I BLD-MCNC: 8.5 MG/DL (ref 8.5–10.1)
CHLORIDE SERPL-SCNC: 104 MMOL/L (ref 97–108)
CO2 SERPL-SCNC: 27 MMOL/L (ref 21–32)
CREAT SERPL-MCNC: 1.56 MG/DL (ref 0.55–1.02)
DIFFERENTIAL METHOD BLD: ABNORMAL
EOSINOPHIL # BLD: 0 K/UL (ref 0–0.4)
EOSINOPHIL NFR BLD: 0 % (ref 0–7)
ERYTHROCYTE [DISTWIDTH] IN BLOOD BY AUTOMATED COUNT: 26 % (ref 11.5–14.5)
FERRITIN SERPL-MCNC: 700 NG/ML (ref 26–388)
GLUCOSE SERPL-MCNC: 83 MG/DL (ref 65–100)
HCT VFR BLD AUTO: 24.3 % (ref 35–47)
HCT VFR BLD AUTO: 26.3 % (ref 35–47)
HGB BLD-MCNC: 7.4 G/DL (ref 11.5–16)
HGB BLD-MCNC: 8 G/DL (ref 11.5–16)
IMM GRANULOCYTES # BLD AUTO: 0 K/UL
IMM GRANULOCYTES NFR BLD AUTO: 0 %
IRON SATN MFR SERPL: 39 % (ref 20–50)
IRON SERPL-MCNC: 82 UG/DL (ref 35–150)
LYMPHOCYTES # BLD: 3.9 K/UL (ref 0.8–3.5)
LYMPHOCYTES NFR BLD: 29 % (ref 12–49)
MCH RBC QN AUTO: 24.3 PG (ref 26–34)
MCHC RBC AUTO-ENTMCNC: 30.5 G/DL (ref 30–36.5)
MCV RBC AUTO: 79.7 FL (ref 80–99)
METAMYELOCYTES NFR BLD MANUAL: 1 %
MONOCYTES # BLD: 1.1 K/UL (ref 0–1)
MONOCYTES NFR BLD: 8 % (ref 5–13)
NEUTS BAND NFR BLD MANUAL: 2 % (ref 0–6)
NEUTS SEG # BLD: 8.4 K/UL (ref 1.8–8)
NEUTS SEG NFR BLD: 60 % (ref 32–75)
NRBC # BLD: 0.07 K/UL (ref 0–0.01)
NRBC # BLD: 0.13 K/UL
NRBC BLD MANUAL-RTO: 1 PER 100 WBC
NRBC BLD-RTO: 0.5 PER 100 WBC
PLATELET # BLD AUTO: 342 K/UL (ref 150–400)
POTASSIUM SERPL-SCNC: 5.2 MMOL/L (ref 3.5–5.1)
RBC # BLD AUTO: 3.05 M/UL (ref 3.8–5.2)
RBC MORPH BLD: ABNORMAL
SODIUM SERPL-SCNC: 137 MMOL/L (ref 136–145)
TIBC SERPL-MCNC: 209 UG/DL (ref 250–450)
WBC # BLD AUTO: 13.4 K/UL (ref 3.6–11)

## 2022-03-12 PROCEDURE — 82728 ASSAY OF FERRITIN: CPT

## 2022-03-12 PROCEDURE — 80048 BASIC METABOLIC PNL TOTAL CA: CPT

## 2022-03-12 PROCEDURE — 36415 COLL VENOUS BLD VENIPUNCTURE: CPT

## 2022-03-12 PROCEDURE — 74011250637 HC RX REV CODE- 250/637: Performed by: NURSE PRACTITIONER

## 2022-03-12 PROCEDURE — 85025 COMPLETE CBC W/AUTO DIFF WBC: CPT

## 2022-03-12 PROCEDURE — 83540 ASSAY OF IRON: CPT

## 2022-03-12 PROCEDURE — 85018 HEMOGLOBIN: CPT

## 2022-03-12 PROCEDURE — 74011250636 HC RX REV CODE- 250/636: Performed by: INTERNAL MEDICINE

## 2022-03-12 PROCEDURE — 74011250637 HC RX REV CODE- 250/637: Performed by: FAMILY MEDICINE

## 2022-03-12 PROCEDURE — 65270000029 HC RM PRIVATE

## 2022-03-12 PROCEDURE — 74011000258 HC RX REV CODE- 258: Performed by: INTERNAL MEDICINE

## 2022-03-12 PROCEDURE — 94640 AIRWAY INHALATION TREATMENT: CPT

## 2022-03-12 PROCEDURE — 94760 N-INVAS EAR/PLS OXIMETRY 1: CPT

## 2022-03-12 PROCEDURE — 74011250637 HC RX REV CODE- 250/637: Performed by: INTERNAL MEDICINE

## 2022-03-12 RX ORDER — POLYETHYLENE GLYCOL 3350 17 G/17G
17 POWDER, FOR SOLUTION ORAL DAILY PRN
Status: DISCONTINUED | OUTPATIENT
Start: 2022-03-12 | End: 2022-03-12

## 2022-03-12 RX ORDER — POLYETHYLENE GLYCOL 3350 17 G/17G
17 POWDER, FOR SOLUTION ORAL DAILY
Status: DISCONTINUED | OUTPATIENT
Start: 2022-03-13 | End: 2022-03-16 | Stop reason: HOSPADM

## 2022-03-12 RX ORDER — DOCUSATE SODIUM 100 MG/1
100 CAPSULE, LIQUID FILLED ORAL 2 TIMES DAILY
Status: DISCONTINUED | OUTPATIENT
Start: 2022-03-12 | End: 2022-03-16 | Stop reason: HOSPADM

## 2022-03-12 RX ADMIN — DICLOFENAC 2 G: 10 GEL TOPICAL at 08:29

## 2022-03-12 RX ADMIN — FERROUS SULFATE TAB 325 MG (65 MG ELEMENTAL FE) 325 MG: 325 (65 FE) TAB at 07:37

## 2022-03-12 RX ADMIN — FLUTICASONE FUROATE, UMECLIDINIUM BROMIDE AND VILANTEROL TRIFENATATE 1 PUFF: 200; 62.5; 25 POWDER RESPIRATORY (INHALATION) at 09:01

## 2022-03-12 RX ADMIN — SUCRALFATE 1 G: 1 TABLET ORAL at 22:11

## 2022-03-12 RX ADMIN — LEVOTHYROXINE SODIUM 100 MCG: 0.1 TABLET ORAL at 05:29

## 2022-03-12 RX ADMIN — DICLOFENAC 2 G: 10 GEL TOPICAL at 22:10

## 2022-03-12 RX ADMIN — DOCUSATE SODIUM 100 MG: 100 CAPSULE, LIQUID FILLED ORAL at 20:07

## 2022-03-12 RX ADMIN — DICLOFENAC 2 G: 10 GEL TOPICAL at 17:46

## 2022-03-12 RX ADMIN — DICLOFENAC 2 G: 10 GEL TOPICAL at 12:12

## 2022-03-12 RX ADMIN — POLYETHYLENE GLYCOL 3350 17 G: 17 POWDER, FOR SOLUTION ORAL at 12:12

## 2022-03-12 RX ADMIN — PIPERACILLIN SODIUM AND TAZOBACTAM SODIUM 3.38 G: 3; .375 INJECTION, POWDER, LYOPHILIZED, FOR SOLUTION INTRAVENOUS at 22:52

## 2022-03-12 RX ADMIN — SUCRALFATE 1 G: 1 TABLET ORAL at 16:25

## 2022-03-12 RX ADMIN — PIPERACILLIN SODIUM AND TAZOBACTAM SODIUM 3.38 G: 3; .375 INJECTION, POWDER, LYOPHILIZED, FOR SOLUTION INTRAVENOUS at 11:00

## 2022-03-12 RX ADMIN — MONTELUKAST 10 MG: 10 TABLET, FILM COATED ORAL at 09:00

## 2022-03-12 RX ADMIN — MIRTAZAPINE 45 MG: 30 TABLET, FILM COATED ORAL at 20:08

## 2022-03-12 RX ADMIN — PANTOPRAZOLE SODIUM 40 MG: 40 TABLET, DELAYED RELEASE ORAL at 09:00

## 2022-03-12 RX ADMIN — SUCRALFATE 1 G: 1 TABLET ORAL at 05:30

## 2022-03-12 RX ADMIN — ATORVASTATIN CALCIUM 40 MG: 40 TABLET, FILM COATED ORAL at 18:00

## 2022-03-12 RX ADMIN — SUCRALFATE 1 G: 1 TABLET ORAL at 11:30

## 2022-03-12 NOTE — PROGRESS NOTES
New order for HGB and HCT daily per Dr. Lizbet Guerra. Pt states she has not had BM, BM has been recorded, still awaiting occult stool. New order for Miralax daily PRN costipation. Bowel sounds active in all 4 quadrants.

## 2022-03-12 NOTE — PROGRESS NOTES
Progress Note    Patient: Corina Quintana MRN: [id number]  SSN: [id number]    YOB: 1933  Age: 80 y.o. Sex: female      Admit Date: 3/7/2022    LOS: 5 days     Subjective:   GI in consultation for Anemia. Ms Manohar Montalvo seen resting comfortably. She denies nausea and vomiting. Denies diarrhea but reports constipation. Denies abdominal pain. HgB 8.0 this morning. Plan for EGD on Monday if hgB continues to decline. No reports of overt bleeding. History of Present Illness: Corina Quintana is a 80 y.o. female who is seen in consultation for Anemia. Ms. Manohar Montalvo states she has been feeling tired and weak for about 3 days but denies nausea, vomiting, diarrhea, or constipation. She reports she has not noticed black tarry stools or bright red rectal bleeding. She states her appetite has decreased but she denies un-intentional weight loss or abdominal pain. Stool for Occult blood pending. She does not accept Blood. She was admitted on 3/7/22. She does report she was having dark colored urine. CT scan of abdomen and pelvis on 3/7 shows cholelithiasis but no evidence of acute cholecystitis. Non obstructing renal stones, no ureteral or bladder stones, no hydronephrosis. No focal hepatic lesion identified, there is a calcified stone in the gallbladder, no biliary duct dilation. There is no bowel obstruction. Ms. Manohar Montalvo hgB was 8.9 on admission and has declined to 7.5 this am. 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 She does have a past medical history significant for hypertension, chronic anemia, asthma, thyroid disease, and dyslipidemia. Plan EGD on Monday if hgB continues to decrease. Iron Panel ferritin pending. CT abdomen/pelvis 3/7/22: IMPRESSION  1. Cholelithiasis, but no CT evidence of acute cholecystitis. 2. Small bilateral pleural effusions with basilar atelectasis. 3. Atherosclerotic disease. 4. Nonobstructing renal stones. No ureteral or bladder stone.  No hydronephrosis. 5. Please see above report for further details      Objective:     Vitals:    03/11/22 2025 03/12/22 0707 03/12/22 0902 03/12/22 1442   BP: 125/78 111/74  109/69   Pulse: 78 91  86   Resp: 18 18  18   Temp: 97.9 °F (36.6 °C) 97.8 °F (36.6 °C)  97.9 °F (36.6 °C)   SpO2: 98% 98% 97% 96%   Weight:       Height:            Intake and Output:  Current Shift: 03/12 0701 - 03/12 1900  In: 560 [P.O.:560]  Out: 800 [Urine:800]  Last three shifts: 03/10 1901 - 03/12 0700  In: 100 [I.V.:100]  Out: 450 [Urine:450]    Physical Exam:   Skin:  Extremities and face reveal no rashes. No barrera erythema. HEENT: Sclerae anicteric. Extra-occular muscles are intact. No abnormal pigmentation of the lips. The neck is supple. Cardiovascular: Regular rate and rhythm. Respiratory:  Comfortable breathing with no accessory muscle use. GI:  Abdomen nondistended, soft, and nontender. No enlargement of the liver or spleen. No masses palpable. Rectal:  Deferred  Musculoskeletal: Generalized weakness  Neurological:  Gross memory appears intact. Patient is alert and oriented. Psychiatric:  Mood appears appropriate with judgement intact.   Lymphatic:  No visible adenopathy      Lab/Data Review:  Recent Results (from the past 24 hour(s))   METABOLIC PANEL, BASIC    Collection Time: 03/12/22  6:29 AM   Result Value Ref Range    Sodium 137 136 - 145 mmol/L    Potassium 5.2 (H) 3.5 - 5.1 mmol/L    Chloride 104 97 - 108 mmol/L    CO2 27 21 - 32 mmol/L    Anion gap 6 5 - 15 mmol/L    Glucose 83 65 - 100 mg/dL    BUN 46 (H) 6 - 20 mg/dL    Creatinine 1.56 (H) 0.55 - 1.02 mg/dL    BUN/Creatinine ratio 29 (H) 12 - 20      GFR est AA 38 (L) >60 ml/min/1.73m2    GFR est non-AA 31 (L) >60 ml/min/1.73m2    Calcium 8.5 8.5 - 10.1 mg/dL   CBC WITH AUTOMATED DIFF    Collection Time: 03/12/22  6:29 AM   Result Value Ref Range    WBC 13.4 (H) 3.6 - 11.0 K/uL    RBC 3.05 (L) 3.80 - 5.20 M/uL    HGB 7.4 (L) 11.5 - 16.0 g/dL    HCT 24.3 (L) 35.0 - 47.0 %    MCV 79.7 (L) 80.0 - 99.0 FL    MCH 24.3 (L) 26.0 - 34.0 PG    MCHC 30.5 30.0 - 36.5 g/dL    RDW 26.0 (H) 11.5 - 14.5 %    PLATELET 162 020 - 652 K/uL    NRBC 0.5 (H) 0.0  WBC    ABSOLUTE NRBC 0.07 (H) 0.00 - 0.01 K/uL    NEUTROPHILS 60 32 - 75 %    BAND NEUTROPHILS 2 0 - 6 %    LYMPHOCYTES 29 12 - 49 %    MONOCYTES 8 5 - 13 %    EOSINOPHILS 0 0 - 7 %    BASOPHILS 0 0 - 1 %    METAMYELOCYTES 1 (H) 0 %    NRBC 1.0  WBC    IMMATURE GRANULOCYTES 0 %    ABS. NEUTROPHILS 8.4 (H) 1.8 - 8.0 K/UL    ABS. LYMPHOCYTES 3.9 (H) 0.8 - 3.5 K/UL    ABS. MONOCYTES 1.1 (H) 0.0 - 1.0 K/UL    ABS. EOSINOPHILS 0.0 0.0 - 0.4 K/UL    ABS. BASOPHILS 0.0 0.0 - 0.1 K/UL    ABSOLUTE NRBC 0.13 K/uL    ABS. IMM. GRANS. 0.0 K/UL    RBC COMMENTS Anisocytosis  4+        RBC COMMENTS Ovalocytes  2+        RBC COMMENTS Teardrop cells  1+        RBC COMMENTS Macrocytosis  1+        RBC COMMENTS Microcytosis  1+        RBC COMMENTS [place] cells  2+        DF Manual     IRON PROFILE    Collection Time: 03/12/22  6:29 AM   Result Value Ref Range    Iron 82 35 - 150 ug/dL    TIBC 209 (L) 250 - 450 ug/dL    Iron % saturation 39 20 - 50 %   FERRITIN    Collection Time: 03/12/22  6:29 AM   Result Value Ref Range    Ferritin 700 (H) 26 - 388 ng/mL   HGB & HCT    Collection Time: 03/12/22 11:12 AM   Result Value Ref Range    HGB 8.0 (L) 11.5 - 16.0 g/dL    HCT 26.3 (L) 35.0 - 47.0 %              US RETROPERITONEUM COMP   Final Result   Medical renal disease      CT ABD PELV WO CONT   Final Result   1. Cholelithiasis, but no CT evidence of acute cholecystitis. 2. Small bilateral pleural effusions with basilar atelectasis. 3. Atherosclerotic disease. 4. Nonobstructing renal stones. No ureteral or bladder stone. No hydronephrosis. 5. Please see above report for further details. XR CHEST PORT   Final Result   No acute pulmonary process.              Assessment:     Active Problems:    Dehydration (3/7/2022)      JORDANA (acute kidney injury) ([facility] [address] (3/7/2022)        Plan:     1. Anemia     Monitor H&H    Pantoprazole 40 mg daily    Iron Panel in the am    Ferritin in the am    Patient does not accept blood     Ferrous sulfate 325 mg daily    EGD on Monday if hgB continues to decline  2. Hyperlipidemia    Continue Lipitor   3. Asthma     Continue Singulair      Continue Trelegy  4. GERD     Sucralfate QID  5. Leukocytosis (trending down)     Continue IV Zosyn     UA shows Negative bacteria, Negative nitrites,     Urine culture pending  6. JORDANA     Nephrology input appreciated       Thank you for allowing me to participate in this patients care.    Plan discussed with Dr. Filiberto Agosto and he approves.                                                  Signed By: Radha Lopez NP     March 12, 2022

## 2022-03-12 NOTE — PROGRESS NOTES
General Daily Progress Note          Patient Name:   J Carlos Cardozo       YOB: 1933       Age:  80 y.o. Admit Date: 3/7/2022      Subjective:         Patient doing well moderate distress        Objective:     Visit Vitals  /74 (BP 1 Location: Right upper arm, BP Patient Position: At rest)   Pulse 91   Temp 97.8 °F (36.6 °C)   Resp 18   Ht 5' 4\" (1.626 m)   Wt 54.4 kg (120 lb)   SpO2 97%   BMI 20.60 kg/m²        Recent Results (from the past 24 hour(s))   METABOLIC PANEL, BASIC    Collection Time: 03/12/22  6:29 AM   Result Value Ref Range    Sodium 137 136 - 145 mmol/L    Potassium 5.2 (H) 3.5 - 5.1 mmol/L    Chloride 104 97 - 108 mmol/L    CO2 27 21 - 32 mmol/L    Anion gap 6 5 - 15 mmol/L    Glucose 83 65 - 100 mg/dL    BUN 46 (H) 6 - 20 mg/dL    Creatinine 1.56 (H) 0.55 - 1.02 mg/dL    BUN/Creatinine ratio 29 (H) 12 - 20      GFR est AA 38 (L) >60 ml/min/1.73m2    GFR est non-AA 31 (L) >60 ml/min/1.73m2    Calcium 8.5 8.5 - 10.1 mg/dL     [unfilled]      Review of Systems    Constitutional: Negative for chills and fever. HENT: Negative. Eyes: Negative. Respiratory: Negative. Cardiovascular: Negative. Gastrointestinal: Negative for abdominal pain and nausea. Skin: Negative. Neurological: Negative. Physical Exam:      Constitutional: pt is oriented to person, place, and time. HENT:   Head: Normocephalic and atraumatic. Eyes: Pupils are equal, round, and reactive to light. EOM are normal.   Cardiovascular: Normal rate, regular rhythm and normal heart sounds. Pulmonary/Chest: Breath sounds normal. No wheezes. No rales. Exhibits no tenderness. Abdominal: Soft. Bowel sounds are normal. There is no abdominal tenderness. There is no rebound and no guarding. Musculoskeletal: Normal range of motion. Neurological: pt is alert and oriented to person, place, and time.      US RETROPERITONEUM COMP   Final Result   Medical renal disease      CT ABD PELV WO CONT   Final Result   1. Cholelithiasis, but no CT evidence of acute cholecystitis. 2. Small bilateral pleural effusions with basilar atelectasis. 3. Atherosclerotic disease. 4. Nonobstructing renal stones. No ureteral or bladder stone. No hydronephrosis. 5. Please see above report for further details. XR CHEST PORT   Final Result   No acute pulmonary process. Recent Results (from the past 24 hour(s))   METABOLIC PANEL, BASIC    Collection Time: 03/12/22  6:29 AM   Result Value Ref Range    Sodium 137 136 - 145 mmol/L    Potassium 5.2 (H) 3.5 - 5.1 mmol/L    Chloride 104 97 - 108 mmol/L    CO2 27 21 - 32 mmol/L    Anion gap 6 5 - 15 mmol/L    Glucose 83 65 - 100 mg/dL    BUN 46 (H) 6 - 20 mg/dL    Creatinine 1.56 (H) 0.55 - 1.02 mg/dL    BUN/Creatinine ratio 29 (H) 12 - 20      GFR est AA 38 (L) >60 ml/min/1.73m2    GFR est non-AA 31 (L) >60 ml/min/1.73m2    Calcium 8.5 8.5 - 10.1 mg/dL       Results     Procedure Component Value Units Date/Time    CULTURE, URINE [[id number]] Collected: 03/11/22 1420    Order Status: Completed Specimen: Urine Updated: 03/11/22 1436    COVID-19 RAPID TEST [[id number]] Collected: 03/07/22 1148    Order Status: Completed Specimen: Nasopharyngeal Updated: 03/07/22 1325     COVID-19 rapid test Not Detected        Comment: Rapid Abbott ID Now   Rapid NAAT:  The specimen is NEGATIVE for SARS-CoV-2, the novel coronavirus associated with COVID-19. Negative results should be treated as presumptive and, if inconsistent with clinical signs and symptoms or necessary for patient management, should be tested with an alternative molecular assay. Negative results do not preclude SARS-CoV-2 infection and should not be used as the sole basis for patient management decisions. This test has been authorized by the FDA under   an Emergency Use Authorization (EUA) for use by authorized laboratories.  Fact sheet for Healthcare Providers: ConventionUpdate.co.nz Fact sheet for Patients: ConventionUpdate.co.nz   Methodology: Isothermal Nucleic Acid Amplification                Labs:     Recent Labs     03/11/22  1000 03/10/22  2015   WBC 11.4*  --    HGB 7.5* 7.3*   HCT 24.5* 23.2*     --      Recent Labs     03/12/22  0629 03/10/22  0628    136   K 5.2* 4.7    101   CO2 27 28   BUN 46* 46*   CREA 1.56* 1.69*   GLU 83 90   CA 8.5 7.7*     No results for input(s): ALT, AP, TBIL, TBILI, TP, ALB, GLOB, GGT, AML, LPSE in the last 72 hours. No lab exists for component: SGOT, GPT, AMYP, HLPSE  No results for input(s): INR, PTP, APTT, INREXT in the last 72 hours. Recent Labs     03/10/22  2015 03/09/22  1825   TIBC 197* 146*   PSAT 32 29   FERR 732* 814*      Lab Results   Component Value Date/Time    Folate 7.9 03/09/2022 06:25 PM      No results for input(s): PH, PCO2, PO2 in the last 72 hours. No results for input(s): CPK, CKNDX, TROIQ in the last 72 hours.     No lab exists for component: CPKMB  Lab Results   Component Value Date/Time    Cholesterol, total 145 02/02/2018 01:41 PM    HDL Cholesterol 52 02/02/2018 01:41 PM    LDL, calculated 71 02/02/2018 01:41 PM    Triglyceride 108 02/02/2018 01:41 PM     Lab Results   Component Value Date/Time    Glucose (POC) 157 (H) 03/08/2022 07:20 AM     Lab Results   Component Value Date/Time    Color Yellow/Straw 03/07/2022 09:33 AM    Appearance Clear 03/07/2022 09:33 AM    Specific gravity 1.016 03/07/2022 09:33 AM    pH (UA) 5.0 03/07/2022 09:33 AM    Protein 30 (A) 03/07/2022 09:33 AM    Glucose Negative 03/07/2022 09:33 AM    Ketone Negative 03/07/2022 09:33 AM    Bilirubin Negative 03/07/2022 09:33 AM    Urobilinogen 0.1 03/07/2022 09:33 AM    Nitrites Negative 03/07/2022 09:33 AM    Leukocyte Esterase Negative 03/07/2022 09:33 AM    Bacteria Negative 03/07/2022 09:33 AM    WBC 0-4 03/07/2022 09:33 AM    RBC 10-20 03/07/2022 09:33 AM         Assessment:     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  Hypertension  Anemia  Sepsis  Metabolic acidosis  Hyperlipidemia  Asthma  GERD      Plan:     Lipitor 40 mg daily  Ferrous sulfate 325 mg daily  Levothyroxine 100 mcg daily  Remeron 45 mg daily  Singulair 10 mg daily  Protonix 40 mg daily  IV Zosyn 3.375 IV every 12 hours  Carafate 1 g Q ID        Current Facility-Administered Medications:     polyethylene glycol (MIRALAX) packet 17 g, 17 g, Oral, DAILY PRN, Simoan Williamson MD    sucralfate (CARAFATE) tablet 1 g, 1 g, Oral, AC&HS, Evelia Giron MD, 1 g at 03/12/22 1130    ferrous sulfate tablet 325 mg, 1 Tablet, Oral, DAILY WITH BREAKFAST, Chantel YEH NP, 325 mg at 03/12/22 0737    epoetin lio-epbx (RETACRIT) injection 10,900 Units, 200 Units/kg, SubCUTAneous, Q7D, Tyshawn Miranda MD, 10,900 Units at 03/11/22 1838    piperacillin-tazobactam (ZOSYN) 3.375 g in 0.9% sodium chloride (MBP/ADV) 100 mL MBP, 3.375 g, IntraVENous, Q12H, Suresh Oviedo MD, Last Rate: 25 mL/hr at 03/12/22 1100, 3.375 g at 03/12/22 1100    atorvastatin (LIPITOR) tablet 40 mg, 40 mg, Oral, QPM, Suresh Oviedo MD, 40 mg at 03/11/22 1838    diclofenac (VOLTAREN) 1 % topical gel 2 g, 2 g, Topical, QID, Suresh Oviedo MD, 2 g at 03/12/22 7096    levothyroxine (SYNTHROID) tablet 100 mcg, 100 mcg, Oral, 6am, Suresh Oviedo MD, 100 mcg at 03/12/22 0529    mirtazapine (REMERON) tablet 45 mg, 45 mg, Oral, QHS, Suresh Oviedo MD, 45 mg at 03/11/22 2028    montelukast (SINGULAIR) tablet 10 mg, 10 mg, Oral, DAILY, Suresh Oviedo MD, 10 mg at 03/12/22 0900    pantoprazole (PROTONIX) tablet 40 mg, 40 mg, Oral, DAILY, Suresh Oviedo MD, 40 mg at 03/12/22 0900    sodium chloride (NS) flush 5-40 mL, 5-40 mL, IntraVENous, PRN, Yani Oviedo MD    fluticasone-umeclidin-vilanter (TRELEGY ELLIPTA) inhaler 1 Puff, 1 Puff, Inhalation, DAILY, Suresh Oviedo MD, 1 Puff at 03/12/22 0901    benzonatate (TESSALON) capsule 100 mg, 100 mg, Oral, TID PRN, Shelbi Ochoa MD    albuterol CONCENTRATE 2.5mg/0.5 mL neb soln, 2.5 mg, Nebulization, Q4H PRN, Shelbi Ochoa MD

## 2022-03-13 LAB
BACTERIA SPEC CULT: NORMAL
HCT VFR BLD AUTO: 24.8 % (ref 35–47)
HGB BLD-MCNC: 7.5 G/DL (ref 11.5–16)
SPECIAL REQUESTS,SREQ: NORMAL

## 2022-03-13 PROCEDURE — 74011250637 HC RX REV CODE- 250/637: Performed by: NURSE PRACTITIONER

## 2022-03-13 PROCEDURE — 74011000258 HC RX REV CODE- 258: Performed by: INTERNAL MEDICINE

## 2022-03-13 PROCEDURE — 94640 AIRWAY INHALATION TREATMENT: CPT

## 2022-03-13 PROCEDURE — 85018 HEMOGLOBIN: CPT

## 2022-03-13 PROCEDURE — 74011250637 HC RX REV CODE- 250/637: Performed by: INTERNAL MEDICINE

## 2022-03-13 PROCEDURE — 65270000029 HC RM PRIVATE

## 2022-03-13 PROCEDURE — 36415 COLL VENOUS BLD VENIPUNCTURE: CPT

## 2022-03-13 PROCEDURE — 74011250636 HC RX REV CODE- 250/636: Performed by: INTERNAL MEDICINE

## 2022-03-13 PROCEDURE — 74011000250 HC RX REV CODE- 250: Performed by: INTERNAL MEDICINE

## 2022-03-13 RX ORDER — SODIUM CHLORIDE 0.9 % (FLUSH) 0.9 %
5-40 SYRINGE (ML) INJECTION EVERY 8 HOURS
Status: CANCELLED | OUTPATIENT
Start: 2022-03-13

## 2022-03-13 RX ORDER — SODIUM CHLORIDE 0.9 % (FLUSH) 0.9 %
5-40 SYRINGE (ML) INJECTION AS NEEDED
Status: CANCELLED | OUTPATIENT
Start: 2022-03-13

## 2022-03-13 RX ORDER — SODIUM CHLORIDE 9 MG/ML
75 INJECTION, SOLUTION INTRAVENOUS CONTINUOUS
Status: CANCELLED | OUTPATIENT
Start: 2022-03-13

## 2022-03-13 RX ADMIN — FLUTICASONE FUROATE, UMECLIDINIUM BROMIDE AND VILANTEROL TRIFENATATE 1 PUFF: 200; 62.5; 25 POWDER RESPIRATORY (INHALATION) at 09:42

## 2022-03-13 RX ADMIN — DICLOFENAC 2 G: 10 GEL TOPICAL at 17:40

## 2022-03-13 RX ADMIN — SUCRALFATE 1 G: 1 TABLET ORAL at 08:49

## 2022-03-13 RX ADMIN — ATORVASTATIN CALCIUM 40 MG: 40 TABLET, FILM COATED ORAL at 17:38

## 2022-03-13 RX ADMIN — PANTOPRAZOLE SODIUM 40 MG: 40 TABLET, DELAYED RELEASE ORAL at 08:49

## 2022-03-13 RX ADMIN — SUCRALFATE 1 G: 1 TABLET ORAL at 17:38

## 2022-03-13 RX ADMIN — SODIUM CHLORIDE, PRESERVATIVE FREE 10 ML: 5 INJECTION INTRAVENOUS at 05:31

## 2022-03-13 RX ADMIN — FERROUS SULFATE TAB 325 MG (65 MG ELEMENTAL FE) 325 MG: 325 (65 FE) TAB at 08:49

## 2022-03-13 RX ADMIN — DOCUSATE SODIUM 100 MG: 100 CAPSULE, LIQUID FILLED ORAL at 08:49

## 2022-03-13 RX ADMIN — DICLOFENAC 2 G: 10 GEL TOPICAL at 08:48

## 2022-03-13 RX ADMIN — POLYETHYLENE GLYCOL 3350 17 G: 17 POWDER, FOR SOLUTION ORAL at 08:48

## 2022-03-13 RX ADMIN — MONTELUKAST 10 MG: 10 TABLET, FILM COATED ORAL at 08:49

## 2022-03-13 RX ADMIN — SUCRALFATE 1 G: 1 TABLET ORAL at 12:18

## 2022-03-13 RX ADMIN — DICLOFENAC 2 G: 10 GEL TOPICAL at 13:00

## 2022-03-13 RX ADMIN — PIPERACILLIN SODIUM AND TAZOBACTAM SODIUM 3.38 G: 3; .375 INJECTION, POWDER, LYOPHILIZED, FOR SOLUTION INTRAVENOUS at 12:18

## 2022-03-13 RX ADMIN — LEVOTHYROXINE SODIUM 100 MCG: 0.1 TABLET ORAL at 05:36

## 2022-03-13 NOTE — PROGRESS NOTES
Problem: Patient Education: Go to Patient Education Activity  Goal: Patient/Family Education  Outcome: Progressing Towards Goal     Problem: Pressure Injury - Risk of  Goal: *Prevention of pressure injury  Description: Document Deonte Scale and appropriate interventions in the flowsheet.   Note: Pressure Injury Interventions:  Sensory Interventions: Assess changes in LOC    Moisture Interventions: Minimize layers    Activity Interventions: PT/OT evaluation    Mobility Interventions: Float heels    Nutrition Interventions: Document food/fluid/supplement intake    Friction and Shear Interventions: HOB 30 degrees or less

## 2022-03-13 NOTE — PROGRESS NOTES
Comprehensive Nutrition Assessment    Type and Reason for Visit: RD nutrition re-screen/LOS    Nutrition Recommendations/Plan:   Continue current diet     Nutrition Assessment:  Admitted for JORDANA, GI following d/t drop in hgb without known cause. Plan for EGD Monday. Pt endorsed fair appetite and intakes pta, denies recent wt loss. RD observed 100% PO at L today, 50-75% Po per I/O. Pt denies nutrition issues at this time. Labs and meds reviewed, K+ 5.2 yesterday. Noted pt on mirtazapine. Malnutrition Assessment:  Malnutrition Status:  No malnutrition      Nutrition Related Findings:  Appears well nourished. No issues with c/s, no N/V/D/C. Last BM 3/11. No edema      Wounds:    None       Current Nutrition Therapies:  ADULT ORAL NUTRITION SUPPLEMENT Breakfast, Lunch, Dinner; Standard 4 oz  ADULT DIET Regular; Low Fat/Low Chol/High Fiber/KEITH; Low Potassium (Less than 3000 mg/day)    Anthropometric Measures:  · Height:  5' 4.02\" (162.6 cm)  · Current Body Wt:  68.4 kg (150 lb 12.7 oz) (3/13, RD obtained)   · Admission Body Wt:  119 lb 14.9 oz (3/7)    · Ideal Body Wt:  120 lbs:  125.7 %   · BMI Category: Overweight (BMI 25.0-29. 9)       Nutrition Diagnosis:   No nutrition diagnosis at this time     Nutrition Interventions:   Food and/or Nutrient Delivery: Continue current diet  Nutrition Education and Counseling: No recommendations at this time  Coordination of Nutrition Care: Continue to monitor while inpatient    Nutrition Monitoring and Evaluation:   Behavioral-Environmental Outcomes: None identified  Food/Nutrient Intake Outcomes: Food and nutrient intake  Physical Signs/Symptoms Outcomes: Weight,Biochemical data    Discharge Planning:    No discharge needs at this time     Electronically signed by [employer] on 3/13/2022 at 3:33 PM    Contact: Ext [phone], or via [facility]

## 2022-03-13 NOTE — PROGRESS NOTES
Progress Note    Patient: Laith Rodriguez MRN: [id number]  SSN: [id number]    YOB: 1933  Age: 80 y.o. Sex: female      Admit Date: 3/7/2022    LOS: 6 days     Subjective:   GI in consultation for Anemia. 3/13: Patient seen in room awake and alert not in distress. No complain of acute pain or overt bleeding. Hgb today is 7.5. Plan for EGD in am.      History of Present Veronika Crump a 80 y. o. female who is seen in consultation for Anemia. Ms. Christina June states she has been feeling tired and weak for about 3 days but denies nausea, vomiting, diarrhea, or constipation. She reports she has not noticed black tarry stools or bright red rectal bleeding. She states her appetite has decreased but she denies un-intentional weight loss or abdominal pain.  Stool for Occult blood pending. She does not accept Blood.  She was admitted on 3/7/22. She does report she was having dark colored urine. CT scan of abdomen and pelvis on 3/7 shows cholelithiasis but no evidence of acute cholecystitis. Non obstructing renal stones, no ureteral or bladder stones, no hydronephrosis. No focal hepatic lesion identified, there is a calcified stone in the gallbladder, no biliary duct dilation. There is no bowel obstruction. Ms. Christina June hgB was 8.9 on admission and has declined to 7.5 this am. 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 She does have a past medical history significant for hypertension, chronic anemia, asthma, thyroid disease, and dyslipidemia.  Plan EGD on Monday if hgB continues to decrease. Iron Panel ferritin pending.     CT abdomen/pelvis 3/7/22: IMPRESSION  1. Cholelithiasis, but no CT evidence of acute cholecystitis. 2. Small bilateral pleural effusions with basilar atelectasis. 3. Atherosclerotic disease. 4. Nonobstructing renal stones. No ureteral or bladder stone. No hydronephrosis.   5. Please see above report for further details         Objective:     Vitals:    03/12/22 1906 03/13/22 0730 03/13/22 1514 03/13/22 1538   BP: 122/74 121/75 104/68    Pulse: 92 84 98    Resp:  18 18    Temp: 98.3 °F (36.8 °C) 97.7 °F (36.5 °C) 98.4 °F (36.9 °C)    SpO2: 97% 94% 95%    Weight:       Height:    5' 4.02\" (1.626 m)        Intake and Output:  Current Shift: 03/13 0701 - 03/13 1900  In: -   Out: 600 [Urine:600]  Last three shifts: 03/11 1901 - 03/13 0700  In: 1160 [P.O.:760; I.V.:400]  Out: 1700 [Urine:1700]    Physical Exam:   Skin:  Extremities and face reveal no rashes. No barrera erythema. HEENT: Sclerae anicteric. Extra-occular muscles are intact. No abnormal pigmentation of the lips. The neck is supple. Cardiovascular: Regular rate and rhythm. Respiratory:  Comfortable breathing with no accessory muscle use. GI:  Abdomen nondistended, soft, and nontender. No enlargement of the liver or spleen. No masses palpable. Rectal:  Deferred  Musculoskeletal: Generalized weakness  Neurological:  Gross memory appears intact. Patient is alert and oriented. Psychiatric:  Mood appears appropriate with judgement intact. Lymphatic:  No visible adenopathy      Lab/Data Review:  Recent Results (from the past 24 hour(s))   HGB & HCT    Collection Time: 03/13/22  6:26 AM   Result Value Ref Range    HGB 7.5 (L) 11.5 - 16.0 g/dL    HCT 24.8 (L) 35.0 - 47.0 %              US RETROPERITONEUM COMP   Final Result   Medical renal disease      CT ABD PELV WO CONT   Final Result   1. Cholelithiasis, but no CT evidence of acute cholecystitis. 2. Small bilateral pleural effusions with basilar atelectasis. 3. Atherosclerotic disease. 4. Nonobstructing renal stones. No ureteral or bladder stone. No hydronephrosis. 5. Please see above report for further details. XR CHEST PORT   Final Result   No acute pulmonary process. Assessment:     Active Problems:    Dehydration (3/7/2022)      JORDANA (acute kidney injury) ([address] (3/7/2022)        Plan:   1. Anemia     hgb today 7.5 Monitor.  Patient does not accept blood transfusion.     Pantoprazole 40 mg daily    Iron Panel in the am    Ferritin in the am    Ferrous sulfate 325 mg daily    EGD on Monday. NPO post midnight. Please get consent. 2. Hyperlipidemia    Continue Lipitor   3. Asthma     Continue Singulair      Continue Trelegy  4. GERD     Sucralfate QID  5. Leukocytosis (trending down)     Continue IV Zosyn     UA shows Negative bacteria, Negative nitrites,     Urine culture pending  6. JORDANA     Nephrology input appreciated    Patient discussed with Dr Sarina Nava and agrees to above impression and plan. Thank you for allowing me to participate in this patients care    Signed By: Jin Brooks.  CHAY Yeboah     March 13, 2022

## 2022-03-13 NOTE — PROGRESS NOTES
General Daily Progress Note          Patient Name:   Maggie Harden       YOB: 1933       Age:  80 y.o. Admit Date: 3/7/2022      Subjective:         Patient doing well no distress        Objective:     Visit Vitals  /75 (BP 1 Location: Left upper arm, BP Patient Position: At rest;Lying;Supine)   Pulse 84   Temp 97.7 °F (36.5 °C)   Resp 18   Ht 5' 4\" (1.626 m)   Wt 54.4 kg (120 lb)   SpO2 94%   BMI 20.60 kg/m²        Recent Results (from the past 24 hour(s))   HGB & HCT    Collection Time: 03/13/22  6:26 AM   Result Value Ref Range    HGB 7.5 (L) 11.5 - 16.0 g/dL    HCT 24.8 (L) 35.0 - 47.0 %     [unfilled]      Review of Systems    Constitutional: Negative for chills and fever. HENT: Negative. Eyes: Negative. Respiratory: Negative. Cardiovascular: Negative. Gastrointestinal: Negative for abdominal pain and nausea. Skin: Negative. Neurological: Negative. Physical Exam:      Constitutional: pt is oriented to person, place, and time. HENT:   Head: Normocephalic and atraumatic. Eyes: Pupils are equal, round, and reactive to light. EOM are normal.   Cardiovascular: Normal rate, regular rhythm and normal heart sounds. Pulmonary/Chest: Breath sounds normal. No wheezes. No rales. Exhibits no tenderness. Abdominal: Soft. Bowel sounds are normal. There is no abdominal tenderness. There is no rebound and no guarding. Musculoskeletal: Normal range of motion. Neurological: pt is alert and oriented to person, place, and time. US RETROPERITONEUM COMP   Final Result   Medical renal disease      CT ABD PELV WO CONT   Final Result   1. Cholelithiasis, but no CT evidence of acute cholecystitis. 2. Small bilateral pleural effusions with basilar atelectasis. 3. Atherosclerotic disease. 4. Nonobstructing renal stones. No ureteral or bladder stone. No hydronephrosis. 5. Please see above report for further details.       XR CHEST PORT   Final Result   No acute pulmonary process. Recent Results (from the past 24 hour(s))   HGB & HCT    Collection Time: 03/13/22  6:26 AM   Result Value Ref Range    HGB 7.5 (L) 11.5 - 16.0 g/dL    HCT 24.8 (L) 35.0 - 47.0 %       Results     Procedure Component Value Units Date/Time    CULTURE, URINE [[id number]] Collected: 03/11/22 1520    Order Status: Completed Specimen: Urine Updated: 03/13/22 1030     Special Requests: No Special Requests        Culture result: No Growth (<1000 cfu/mL)       COVID-19 RAPID TEST [[id number]] Collected: 03/07/22 1148    Order Status: Completed Specimen: Nasopharyngeal Updated: 03/07/22 1325     COVID-19 rapid test Not Detected        Comment: Rapid Abbott ID Now   Rapid NAAT:  The specimen is NEGATIVE for SARS-CoV-2, the novel coronavirus associated with COVID-19. Negative results should be treated as presumptive and, if inconsistent with clinical signs and symptoms or necessary for patient management, should be tested with an alternative molecular assay. Negative results do not preclude SARS-CoV-2 infection and should not be used as the sole basis for patient management decisions. This test has been authorized by the FDA under   an Emergency Use Authorization (EUA) for use by authorized laboratories. Fact sheet for Healthcare Providers: ConventionUpdate.co.nz Fact sheet for Patients: ConventionUpdate.co.nz   Methodology: Isothermal Nucleic Acid Amplification                Labs:     Recent Labs     03/13/22  0626 03/12/22  1112 03/12/22  0629 03/12/22  0629 03/11/22  1000 03/11/22  1000   WBC  --   --   --  13.4*  --  11.4*   HGB 7.5* 8.0*   < > 7.4*   < > 7.5*   HCT 24.8* 26.3*   < > 24.3*   < > 24.5*   PLT  --   --   --  342  --  290    < > = values in this interval not displayed.      Recent Labs     03/12/22  0629      K 5.2*      CO2 27   BUN 46*   CREA 1.56*   GLU 83   CA 8.5     No results for input(s): ALT, AP, TBIL, TBILI, TP, ALB, GLOB, GGT, AML, LPSE in the last 72 hours. No lab exists for component: SGOT, GPT, AMYP, HLPSE  No results for input(s): INR, PTP, APTT, INREXT, INREXT in the last 72 hours. Recent Labs     03/12/22  0629 03/10/22  2015   TIBC 209* 197*   PSAT 39 32   FERR 700* 732*      Lab Results   Component Value Date/Time    Folate 7.9 03/09/2022 06:25 PM      No results for input(s): PH, PCO2, PO2 in the last 72 hours. No results for input(s): CPK, CKNDX, TROIQ in the last 72 hours.     No lab exists for component: CPKMB  Lab Results   Component Value Date/Time    Cholesterol, total 145 02/02/2018 01:41 PM    HDL Cholesterol 52 02/02/2018 01:41 PM    LDL, calculated 71 02/02/2018 01:41 PM    Triglyceride 108 02/02/2018 01:41 PM     Lab Results   Component Value Date/Time    Glucose (POC) 157 (H) 03/08/2022 07:20 AM     Lab Results   Component Value Date/Time    Color Yellow/Straw 03/07/2022 09:33 AM    Appearance Clear 03/07/2022 09:33 AM    Specific gravity 1.016 03/07/2022 09:33 AM    pH (UA) 5.0 03/07/2022 09:33 AM    Protein 30 (A) 03/07/2022 09:33 AM    Glucose Negative 03/07/2022 09:33 AM    Ketone Negative 03/07/2022 09:33 AM    Bilirubin Negative 03/07/2022 09:33 AM    Urobilinogen 0.1 03/07/2022 09:33 AM    Nitrites Negative 03/07/2022 09:33 AM    Leukocyte Esterase Negative 03/07/2022 09:33 AM    Bacteria Negative 03/07/2022 09:33 AM    WBC 0-4 03/07/2022 09:33 AM    RBC 10-20 03/07/2022 09:33 AM         Assessment:     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  Hypertension  Anemia  Sepsis  Metabolic acidosis  Hyperlipidemia  Asthma  GERD      Plan:     Lipitor 40 mg daily  Ferrous sulfate 325 mg daily  Levothyroxine 100 mcg daily  Remeron 45 mg daily  Singulair 10 mg daily  Protonix 40 mg daily  IV Zosyn 3.375 IV every 12 hours  Carafate 1 g Q ID    EGD on Monday        Current Facility-Administered Medications:     polyethylene glycol (MIRALAX) packet 17 g, 17 g, Oral, DAILY, Kacey Toribio NP, 17 g at 03/13/22 0848    docusate sodium (COLACE) capsule 100 mg, 100 mg, Oral, BID, Cj Seble F, NP, 100 mg at 03/13/22 0849    sucralfate (CARAFATE) tablet 1 g, 1 g, Oral, AC&HS, Ema RIVERA MD, 1 g at 03/13/22 1218    ferrous sulfate tablet 325 mg, 1 Tablet, Oral, DAILY WITH BREAKFAST, Cj [place] F, NP, 325 mg at 03/13/22 0849    epoetin lio-epbx (RETACRIT) injection 10,900 Units, 200 Units/kg, SubCUTAneous, Q7D, Tyshawn Miranda MD, 10,900 Units at 03/11/22 1838    piperacillin-tazobactam (ZOSYN) 3.375 g in 0.9% sodium chloride (MBP/ADV) 100 mL MBP, 3.375 g, IntraVENous, Q12H, Suresh Oviedo MD, Last Rate: 25 mL/hr at 03/13/22 1218, 3.375 g at 03/13/22 1218    atorvastatin (LIPITOR) tablet 40 mg, 40 mg, Oral, QPM, Ema RIVERA MD, 40 mg at 03/12/22 1800    diclofenac (VOLTAREN) 1 % topical gel 2 g, 2 g, Topical, QID, Suresh Oviedo MD, 2 g at 03/13/22 0848    levothyroxine (SYNTHROID) tablet 100 mcg, 100 mcg, Oral, 6am, Suresh Oviedo MD, 100 mcg at 03/13/22 0536    mirtazapine (REMERON) tablet 45 mg, 45 mg, Oral, QHS, Suresh Oviedo MD, 45 mg at 03/12/22 2008    montelukast (SINGULAIR) tablet 10 mg, 10 mg, Oral, DAILY, Suresh Oviedo MD, 10 mg at 03/13/22 0849    pantoprazole (PROTONIX) tablet 40 mg, 40 mg, Oral, DAILY, Priscila De La Rosa MD, 40 mg at 03/13/22 0849    sodium chloride (NS) flush 5-40 mL, 5-40 mL, IntraVENous, PRN, Suresh Smith MD, 10 mL at 03/13/22 0531    fluticasone-umeclidin-vilanter (TRELEGY ELLIPTA) inhaler 1 Puff, 1 Puff, Inhalation, DAILY, Priscila De La Rosa MD, 1 Puff at 03/13/22 0942    benzonatate (TESSALON) capsule 100 mg, 100 mg, Oral, TID PRN, Ema RIVERA MD    albuterol CONCENTRATE 2.5mg/0.5 mL neb soln, 2.5 mg, Nebulization, Q4H PRN, Priscila De La Rosa MD

## 2022-03-14 ENCOUNTER — ANESTHESIA EVENT (OUTPATIENT)
Dept: ENDOSCOPY | Age: 87
DRG: 872 | End: 2022-03-14
Payer: MEDICARE

## 2022-03-14 ENCOUNTER — APPOINTMENT (OUTPATIENT)
Dept: ENDOSCOPY | Age: 87
DRG: 872 | End: 2022-03-14
Attending: INTERNAL MEDICINE
Payer: MEDICARE

## 2022-03-14 ENCOUNTER — ANESTHESIA (OUTPATIENT)
Dept: ENDOSCOPY | Age: 87
DRG: 872 | End: 2022-03-14
Payer: MEDICARE

## 2022-03-14 LAB
HCT VFR BLD AUTO: 24.2 % (ref 35–47)
HGB BLD-MCNC: 7.5 G/DL (ref 11.5–16)

## 2022-03-14 PROCEDURE — 85018 HEMOGLOBIN: CPT

## 2022-03-14 PROCEDURE — 76060000031 HC ANESTHESIA FIRST 0.5 HR: Performed by: INTERNAL MEDICINE

## 2022-03-14 PROCEDURE — 36415 COLL VENOUS BLD VENIPUNCTURE: CPT

## 2022-03-14 PROCEDURE — 74011250636 HC RX REV CODE- 250/636: Performed by: NURSE ANESTHETIST, CERTIFIED REGISTERED

## 2022-03-14 PROCEDURE — 94760 N-INVAS EAR/PLS OXIMETRY 1: CPT

## 2022-03-14 PROCEDURE — 74011000258 HC RX REV CODE- 258: Performed by: INTERNAL MEDICINE

## 2022-03-14 PROCEDURE — 74011250637 HC RX REV CODE- 250/637: Performed by: INTERNAL MEDICINE

## 2022-03-14 PROCEDURE — 97116 GAIT TRAINING THERAPY: CPT

## 2022-03-14 PROCEDURE — 2709999900 HC NON-CHARGEABLE SUPPLY: Performed by: INTERNAL MEDICINE

## 2022-03-14 PROCEDURE — 74011250636 HC RX REV CODE- 250/636: Performed by: INTERNAL MEDICINE

## 2022-03-14 PROCEDURE — 65270000029 HC RM PRIVATE

## 2022-03-14 PROCEDURE — 94640 AIRWAY INHALATION TREATMENT: CPT

## 2022-03-14 PROCEDURE — 97530 THERAPEUTIC ACTIVITIES: CPT

## 2022-03-14 PROCEDURE — 74011250637 HC RX REV CODE- 250/637: Performed by: NURSE PRACTITIONER

## 2022-03-14 PROCEDURE — 76040000019: Performed by: INTERNAL MEDICINE

## 2022-03-14 PROCEDURE — 0DJ08ZZ INSPECTION OF UPPER INTESTINAL TRACT, VIA NATURAL OR ARTIFICIAL OPENING ENDOSCOPIC: ICD-10-PCS | Performed by: INTERNAL MEDICINE

## 2022-03-14 PROCEDURE — 74011000250 HC RX REV CODE- 250: Performed by: NURSE ANESTHETIST, CERTIFIED REGISTERED

## 2022-03-14 RX ORDER — LIDOCAINE HYDROCHLORIDE 20 MG/ML
INJECTION, SOLUTION EPIDURAL; INFILTRATION; INTRACAUDAL; PERINEURAL AS NEEDED
Status: DISCONTINUED | OUTPATIENT
Start: 2022-03-14 | End: 2022-03-14 | Stop reason: HOSPADM

## 2022-03-14 RX ORDER — SODIUM CHLORIDE 9 MG/ML
INJECTION, SOLUTION INTRAVENOUS
Status: DISCONTINUED | OUTPATIENT
Start: 2022-03-14 | End: 2022-03-14 | Stop reason: HOSPADM

## 2022-03-14 RX ORDER — PROPOFOL 10 MG/ML
INJECTION, EMULSION INTRAVENOUS AS NEEDED
Status: DISCONTINUED | OUTPATIENT
Start: 2022-03-14 | End: 2022-03-14 | Stop reason: HOSPADM

## 2022-03-14 RX ADMIN — FLUTICASONE FUROATE, UMECLIDINIUM BROMIDE AND VILANTEROL TRIFENATATE 1 PUFF: 200; 62.5; 25 POWDER RESPIRATORY (INHALATION) at 08:31

## 2022-03-14 RX ADMIN — ATORVASTATIN CALCIUM 40 MG: 40 TABLET, FILM COATED ORAL at 18:00

## 2022-03-14 RX ADMIN — MIRTAZAPINE 45 MG: 30 TABLET, FILM COATED ORAL at 20:03

## 2022-03-14 RX ADMIN — DOCUSATE SODIUM 100 MG: 100 CAPSULE, LIQUID FILLED ORAL at 00:17

## 2022-03-14 RX ADMIN — PIPERACILLIN SODIUM AND TAZOBACTAM SODIUM 3.38 G: 3; .375 INJECTION, POWDER, LYOPHILIZED, FOR SOLUTION INTRAVENOUS at 11:37

## 2022-03-14 RX ADMIN — SUCRALFATE 1 G: 1 TABLET ORAL at 11:37

## 2022-03-14 RX ADMIN — DOCUSATE SODIUM 100 MG: 100 CAPSULE, LIQUID FILLED ORAL at 20:03

## 2022-03-14 RX ADMIN — DICLOFENAC 2 G: 10 GEL TOPICAL at 13:00

## 2022-03-14 RX ADMIN — DICLOFENAC 2 G: 10 GEL TOPICAL at 18:00

## 2022-03-14 RX ADMIN — DICLOFENAC 2 G: 10 GEL TOPICAL at 23:56

## 2022-03-14 RX ADMIN — LIDOCAINE HYDROCHLORIDE 40 MG: 20 INJECTION, SOLUTION EPIDURAL; INFILTRATION; INTRACAUDAL; PERINEURAL at 10:39

## 2022-03-14 RX ADMIN — PROPOFOL 50 MG: 10 INJECTION, EMULSION INTRAVENOUS at 10:39

## 2022-03-14 RX ADMIN — SUCRALFATE 1 G: 1 TABLET ORAL at 16:52

## 2022-03-14 RX ADMIN — PIPERACILLIN SODIUM AND TAZOBACTAM SODIUM 3.38 G: 3; .375 INJECTION, POWDER, LYOPHILIZED, FOR SOLUTION INTRAVENOUS at 00:16

## 2022-03-14 RX ADMIN — MIRTAZAPINE 45 MG: 30 TABLET, FILM COATED ORAL at 00:17

## 2022-03-14 RX ADMIN — DICLOFENAC 2 G: 10 GEL TOPICAL at 09:00

## 2022-03-14 RX ADMIN — SODIUM CHLORIDE: 9 INJECTION, SOLUTION INTRAVENOUS at 10:38

## 2022-03-14 RX ADMIN — SUCRALFATE 1 G: 1 TABLET ORAL at 00:17

## 2022-03-14 RX ADMIN — PIPERACILLIN SODIUM AND TAZOBACTAM SODIUM 3.38 G: 3; .375 INJECTION, POWDER, LYOPHILIZED, FOR SOLUTION INTRAVENOUS at 23:56

## 2022-03-14 RX ADMIN — DICLOFENAC 2 G: 10 GEL TOPICAL at 00:17

## 2022-03-14 RX ADMIN — SODIUM CHLORIDE: 9 INJECTION, SOLUTION INTRAVENOUS at 10:29

## 2022-03-14 RX ADMIN — SUCRALFATE 1 G: 1 TABLET ORAL at 23:56

## 2022-03-14 NOTE — PROGRESS NOTES
Progress Note    Patient: Dea Oneill MRN: [id number]  SSN: [id number]    YOB: 1933  Age: 80 y.o. Sex: female      Admit Date: 3/7/2022    LOS: 7 days     Subjective:     80years old was scheduled for endoscopy today for anemia. Patient refused blood transfusion on Denominational grounds. The operative report is still pending    Objective:     Vitals:    03/14/22 1052 03/14/22 1055 03/14/22 1100 03/14/22 1402   BP: 99/64 99/66 110/67 110/67   Pulse: 97 (!) 102 98 98   Resp: 22 14 20 18   Temp:   97 °F (36.1 °C) 97 °F (36.1 °C)   SpO2: 98% 98% 95%    Weight:       Height:            Intake and Output:  Current Shift: 03/14 0701 - 03/14 1900  In: 50 [I.V.:50]  Out: -   Last three shifts: 03/12 1901 - 03/14 0700  In: 600 [P.O.:200; I.V.:400]  Out: 1200 [Urine:1200]    Physical Exam:   General:  Alert, cooperative, no distress, appears stated age. Eyes:  Conjunctivae/corneas clear. PERRL, EOMs intact. Fundi benign   Ears:  Normal TMs and external ear canals both ears. Nose: Nares normal. Septum midline. Mucosa normal. No drainage or sinus tenderness. Mouth/Throat: Lips, mucosa, and tongue normal. Teeth and gums normal.   Neck: Supple, symmetrical, trachea midline, no adenopathy, thyroid: no enlargment/tenderness/nodules, no carotid bruit and no JVD. Back:   Symmetric, no curvature. ROM normal. No CVA tenderness. Lungs:   Clear to auscultation bilaterally. Heart:  Regular rate and rhythm, S1, S2 normal, no murmur, click, rub or gallop. Abdomen:   Soft, non-tender. Bowel sounds normal. No masses,  No organomegaly. Extremities: Extremities normal, atraumatic, no cyanosis or edema. Pulses: 2+ and symmetric all extremities. Skin: Skin color, texture, turgor normal. No rashes or lesions   Lymph nodes: Cervical, supraclavicular, and axillary nodes normal.   Neurologic: CNII-XII intact. Normal strength, sensation and reflexes throughout. Lab/Data Review:   All lab results for the last 24 hours reviewed.      Recent Results (from the past 24 hour(s))   HGB & HCT    Collection Time: 03/14/22  7:10 AM   Result Value Ref Range    HGB 7.5 (L) 11.5 - 16.0 g/dL    HCT 24.2 (L) 35.0 - 47.0 %         Assessment:     Active Problems:    Dehydration (3/7/2022)      JORDANA (acute kidney injury) ([facility] (3/7/2022)        Plan:     Continue present treatment PT and OT possible discharge in 1 or 2 days    Signed By: Mallorie Potts MD     March 14, 2022

## 2022-03-14 NOTE — ROUTINE PROCESS
Pt. Out of PACU in stable condition with transport in stable condition. Attempted to call report; primary nurse will call back.

## 2022-03-14 NOTE — PROGRESS NOTES
PHYSICAL THERAPY TREATMENT  Patient: Kathy Stoddard (36 y.o. female)  Date: 3/14/2022  Diagnosis: Dehydration [E86.0]  JORDANA (acute kidney injury) ([facility] [N17.9] <principal problem not specified>  Procedure(s) (LRB):  ESOPHAGOGASTRODUODENOSCOPY (EGD) (N/A) Day of Surgery  Precautions:    Chart, physical therapy assessment, plan of care and goals were reviewed. ASSESSMENT  Patient continues with skilled PT services and is progressing towards goals. Patient supine in bed upon approach and agreed to therapy today. Patient performed supine> sitting EOB at [facility]. Patient demonstrated good unsupported static and dynamic sitting balance. Patient then performed STS to  at [facility] and ambulated at [facility] to bathroom were patient performed stand> sit on toilet at [facility] with proper hand placement. Patient able to perform autumn care and hand washing at [facility]. Patient then ambulated with  and [facility] for 350 feet in hallway. Patient demonstrated steady gait pattern with no LOB or knee buckling during gait. patien then returned to seated EOB to perform seated TE ( see details below). Patient then performed SPT at [facility] to bedside chair. Patient left seated in chair with family member present,and all needs meet. Nursing alerted to patient sitting up in chair. Current Level of Function Impacting Discharge (mobility/balance): general weakness     Other factors to consider for discharge: PLOF, assistance at home. PLAN :  Patient continues to benefit from skilled intervention to address the above impairments. Continue treatment per established plan of care. to address goals.     Recommendation for discharge: (in order for the patient to meet his/her long term goals)  Home with [address] [place] [address] and continued family care    This discharge recommendation:  Has been made in collaboration with the attending provider and/or case management    IF patient discharges home will need the following DME: rolling walker       SUBJECTIVE:   Patient stated i need to get these legs strong.     OBJECTIVE DATA SUMMARY:   Critical Behavior:  Neurologic State: Alert  Orientation Level: Oriented X4  Cognition: Follows commands  Safety/Judgement: Awareness of environment  Functional Mobility Training:  Bed Mobility:  Supine to Sit: Stand-by assistance  Scooting: Stand-by assistance  Transfers:  Sit to Stand: Contact guard assistance  Stand to Sit: Contact guard assistance  Bed to Chair: Contact guard assistance  Balance:  Sitting: Intact; Without support  Standing: Intact; With support  Ambulation/Gait Training:  Distance (ft): 350 Feet (ft)  Assistive Device: Gait belt;Walker, rolling  Ambulation - Level of Assistance: Stand-by assistance;Contact guard assistance  Base of Support: Narrowed  Speed/Daina: Slow  Therapeutic Exercises:   1x15 AP  1x15 LAQ  1x15 seated marches  1x15 hip ADD/ABD  Pain Rating:  No pain reported    Activity Tolerance:   Good  Please refer to the flowsheet for vital signs taken during this treatment. After treatment patient left in no apparent distress:   Sitting in chair, Call bell within reach, and Caregiver / family present    COMMUNICATION/COLLABORATION:   The patients plan of care was discussed with: Registered nurse. Problem: Mobility Impaired (Adult and Pediatric)  Goal: *Acute Goals and Plan of Care (Insert Text)  Description: Pt will be I with LE HEP in 7 days. Pt will perform bed mobility with mod I in 7 days. Pt will perform transfers with mod I in 7 days. Pt will amb  feet with LRAD safely with mod I in 7 days. Pt will ascend/descend 4 steps with B handrails and CGA in 7 days to safely enter home.        Outcome: Progressing Towards Goal       Jasson Abdalla PTA   Time Calculation: 31 mins

## 2022-03-14 NOTE — PROGRESS NOTES
Progress Note    Patient: Corina Quintana MRN: [id number]  SSN: [id number]    YOB: 1933  Age: 80 y.o. Sex: female      Admit Date: 3/7/2022    LOS: 7 days     Subjective:   GI in consultation for Anemia    3/14: patient seen status post EGD today. She is sleepy at this time. She voices no complaints at present time. EGD shows esophagitis, gastritis, acute without bleeding, no evidence of bleeding ulcer. Continue present PPI therapy. HgB 7.5 this am.    EGD 3/14:  esophagitis, gastritis, acute without bleeding, no evidence of bleeding ulcer. Continue present PPI therapy. History of Present Gómez Garcia a 80 y. o. female who is seen in consultation for Anemia. Ms. Manohar Montalvo states she has been feeling tired and weak for about 3 days but denies nausea, vomiting, diarrhea, or constipation. She reports she has not noticed black tarry stools or bright red rectal bleeding. She states her appetite has decreased but she denies un-intentional weight loss or abdominal pain.  Stool for Occult blood pending. She does not accept Blood.  She was admitted on 3/7/22. She does report she was having dark colored urine. CT scan of abdomen and pelvis on 3/7 shows cholelithiasis but no evidence of acute cholecystitis. Non obstructing renal stones, no ureteral or bladder stones, no hydronephrosis. No focal hepatic lesion identified, there is a calcified stone in the gallbladder, no biliary duct dilation. There is no bowel obstruction. Ms. Manohar Montalvo hgB was 8.9 on admission and has declined to 7.5 this am. 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 She does have a past medical history significant for hypertension, chronic anemia, asthma, thyroid disease, and dyslipidemia.  Plan EGD on Monday if hgB continues to decrease. Iron Panel ferritin pending.     CT abdomen/pelvis 3/7/22: IMPRESSION  1. Cholelithiasis, but no CT evidence of acute cholecystitis.   2. Small bilateral pleural effusions with basilar atelectasis. 3. Atherosclerotic disease. 4. Nonobstructing renal stones. No ureteral or bladder stone. No hydronephrosis. 5. Please see above report for further details      Objective:     Vitals:    03/14/22 1052 03/14/22 1055 03/14/22 1100 03/14/22 1402   BP: 99/64 99/66 110/67 110/67   Pulse: 97 (!) 102 98 98   Resp: 22 14 20 18   Temp:   97 °F (36.1 °C) 97 °F (36.1 °C)   SpO2: 98% 98% 95%    Weight:       Height:            Intake and Output:  Current Shift: 03/14 0701 - 03/14 1900  In: 50 [I.V.:50]  Out: -   Last three shifts: 03/12 1901 - 03/14 0700  In: 600 [P.O.:200; I.V.:400]  Out: 1200 [Urine:1200]    Physical Exam:   Skin:  Extremities and face reveal no rashes. No barrera erythema. HEENT: Sclerae anicteric. Extra-occular muscles are intact. No abnormal pigmentation of the lips. The neck is supple. Cardiovascular: Regular rate and rhythm. Respiratory:  Comfortable breathing with no accessory muscle use. GI:  Abdomen nondistended, soft, and nontender. No enlargement of the liver or spleen. No masses palpable. Rectal:  Deferred  Musculoskeletal: Generalized weakness  Neurological:  Gross memory appears intact. Patient is alert and oriented. Psychiatric:  Mood appears appropriate with judgement intact. Lymphatic:  No visible adenopathy      Lab/Data Review:  Recent Results (from the past 24 hour(s))   HGB & HCT    Collection Time: 03/14/22  7:10 AM   Result Value Ref Range    HGB 7.5 (L) 11.5 - 16.0 g/dL    HCT 24.2 (L) 35.0 - 47.0 %              US RETROPERITONEUM COMP   Final Result   Medical renal disease      CT ABD PELV WO CONT   Final Result   1. Cholelithiasis, but no CT evidence of acute cholecystitis. 2. Small bilateral pleural effusions with basilar atelectasis. 3. Atherosclerotic disease. 4. Nonobstructing renal stones. No ureteral or bladder stone. No hydronephrosis. 5. Please see above report for further details.       XR CHEST PORT   Final Result   No acute pulmonary process. Assessment:     Active Problems:    Dehydration (3/7/2022)      JORDANA (acute kidney injury) ([facility] (3/7/2022)        Plan:   1. Anemia     hgb today 7.5 Monitor. Patient does not accept blood transfusion.     Pantoprazole 40 mg daily    Iron Panel in the am    Ferritin in the am    Ferrous sulfate 325 mg daily    S/P EGD 3/14: esophagitis, gastritis, acute without bleeding, no evidence of bleeding ulcer. Continue present PPI therapy. 2. Hyperlipidemia    Continue Lipitor   3. Asthma     Continue Singulair      Continue Trelegy  4. GERD     Sucralfate QID  5. Leukocytosis (trending down)     Continue IV Zosyn     UA shows Negative bacteria, Negative nitrites,     Urine culture pending  6. JORDANA     Nephrology input appreciated    Thank you for allowing me to participate in this patients care. Plan discussed with Dr. Sarina Nava and he approves.     Signed By: Dino Santoro NP     March 14, 2022

## 2022-03-14 NOTE — PROGRESS NOTES
CM reviewed clinical chart. Patient's discharge plan is to return home with home health services provided by [facility]. CM team will continue to follow.

## 2022-03-14 NOTE — ANESTHESIA POSTPROCEDURE EVALUATION
Procedure(s):  ESOPHAGOGASTRODUODENOSCOPY (EGD). MAC    Anesthesia Post Evaluation      Multimodal analgesia: multimodal analgesia not used between 6 hours prior to anesthesia start to PACU discharge  Patient location during evaluation: bedside (Endoscopy suite)  Patient participation: complete - patient cannot participate  Level of consciousness: sleepy but conscious  Pain score: 0  Pain management: adequate  Airway patency: patent  Anesthetic complications: no  Cardiovascular status: acceptable  Respiratory status: acceptable and nasal cannula  Hydration status: acceptable  Comments: This patient remained on the stretcher. The patient was handed off to the endoscopy nursing team.  All questions regarding pre-, intra-, and postoperative care were answered.   Post anesthesia nausea and vomiting:  none      INITIAL Post-op Vital signs:   Vitals Value Taken Time   /67 03/14/22 1402   Temp 36.1 °C (97 °F) 03/14/22 1402   Pulse 98 03/14/22 1402   Resp 18 03/14/22 1402   SpO2 95 % 03/14/22 1100

## 2022-03-14 NOTE — PROGRESS NOTES
Physician Progress Note      PATIENT:               Catalina Jones  CSN #:                  [id number]  :                       1933  ADMIT DATE:       3/7/2022 8:58 AM  DISCH DATE:  RESPONDING  PROVIDER #:        Foster Bonilla MD          QUERY TEXT:    Pt admitted with renal failure. Noted documentation of sepsis on 3/9 by ordered nephrology consultant. If possible, please document in progress notes and discharge summary:      The medical record reflects the following:  Risk Factors: 81 yo female with asthma, JORDANA, HTN  Clinical Indicators: WBC 15.5   Creatinine 2.0   , 108, 103 - no infectious source noted  Treatment: IV Zosyn    Thank you,  Lenore Morton, RN, CCDS  Options provided:  -- Sepsis confirmed present on admission  -- Sepsis ruled out  -- Other - I will add my own diagnosis  -- Disagree - Not applicable / Not valid  -- Disagree - Clinically unable to determine / Unknown  -- Refer to Clinical Documentation Reviewer    PROVIDER RESPONSE TEXT:    The diagnosis of Sepsis was confirmed as present on admission.     Query created by: David Ross on 3/11/2022 9:10 AM      Electronically signed by:  Foster Bonilla MD 3/14/2022 9:04 AM

## 2022-03-14 NOTE — ANESTHESIA PREPROCEDURE EVALUATION
Relevant Problems   RESPIRATORY SYSTEM   (+) Asthma      CARDIOVASCULAR   (+) Hypertension      RENAL FAILURE   (+) JORDANA (acute kidney injury) ([facility]       Anesthetic History   No history of anesthetic complications            Review of Systems / Medical History  Patient summary reviewed, nursing notes reviewed and pertinent labs reviewed    Pulmonary            Asthma     Comments: COUGH. Neuro/Psych             Comments: BEREAVEMENT. Cardiovascular    Hypertension          Hyperlipidemia         GI/Hepatic/Renal         Renal disease: ARF       Endo/Other        Arthritis and anemia (Hb/Hct 7.5/24. 2)    Comments: Sickle cell trait. Sepsis  Other Findings   Comments: Ch. BACK  PAIN. K+ 5.2         Physical Exam    Airway  Mallampati: III  TM Distance: 4 - 6 cm  Neck ROM: normal range of motion   Mouth opening: Normal     Cardiovascular    Rhythm: regular  Rate: normal         Dental      Comments: Dentures.     Pulmonary  Breath sounds clear to auscultation               Abdominal  GI exam deferred       Other Findings            Anesthetic Plan    ASA: 3  Anesthesia type: MAC          Induction: Intravenous  Anesthetic plan and risks discussed with: Patient and Son / Daughter

## 2022-03-15 LAB
HCT VFR BLD AUTO: 24.3 % (ref 35–47)
HGB BLD-MCNC: 7.4 G/DL (ref 11.5–16)

## 2022-03-15 PROCEDURE — 36415 COLL VENOUS BLD VENIPUNCTURE: CPT

## 2022-03-15 PROCEDURE — 85018 HEMOGLOBIN: CPT

## 2022-03-15 PROCEDURE — 74011250637 HC RX REV CODE- 250/637: Performed by: INTERNAL MEDICINE

## 2022-03-15 PROCEDURE — 74011250637 HC RX REV CODE- 250/637: Performed by: NURSE PRACTITIONER

## 2022-03-15 PROCEDURE — 94760 N-INVAS EAR/PLS OXIMETRY 1: CPT

## 2022-03-15 PROCEDURE — 74011000258 HC RX REV CODE- 258: Performed by: INTERNAL MEDICINE

## 2022-03-15 PROCEDURE — 94640 AIRWAY INHALATION TREATMENT: CPT

## 2022-03-15 PROCEDURE — 74011250636 HC RX REV CODE- 250/636: Performed by: INTERNAL MEDICINE

## 2022-03-15 PROCEDURE — 65270000029 HC RM PRIVATE

## 2022-03-15 RX ORDER — LANOLIN ALCOHOL/MO/W.PET/CERES
325 CREAM (GRAM) TOPICAL
Qty: 30 TABLET | Refills: 0 | Status: SHIPPED | OUTPATIENT
Start: 2022-03-16

## 2022-03-15 RX ORDER — POLYETHYLENE GLYCOL 3350 17 G/17G
17 POWDER, FOR SOLUTION ORAL DAILY
Qty: 30 PACKET | Refills: 0 | Status: SHIPPED | OUTPATIENT
Start: 2022-03-16

## 2022-03-15 RX ORDER — AMOXICILLIN AND CLAVULANATE POTASSIUM 500; 125 MG/1; MG/1
1 TABLET, FILM COATED ORAL EVERY 12 HOURS
Qty: 10 TABLET | Refills: 0 | Status: SHIPPED | OUTPATIENT
Start: 2022-03-15

## 2022-03-15 RX ORDER — BENZONATATE 100 MG/1
100 CAPSULE ORAL
Qty: 30 CAPSULE | Refills: 0 | Status: SHIPPED | OUTPATIENT
Start: 2022-03-15 | End: 2022-03-22

## 2022-03-15 RX ORDER — ALBUTEROL SULFATE 2.5 MG/.5ML
2.5 SOLUTION RESPIRATORY (INHALATION)
Qty: 30 NEBULE | Refills: 0 | Status: SHIPPED | OUTPATIENT
Start: 2022-03-15

## 2022-03-15 RX ORDER — DOCUSATE SODIUM 100 MG/1
100 CAPSULE, LIQUID FILLED ORAL 2 TIMES DAILY
Qty: 60 CAPSULE | Refills: 2 | Status: SHIPPED | OUTPATIENT
Start: 2022-03-15 | End: 2022-06-13

## 2022-03-15 RX ORDER — SUCRALFATE 1 G/1
1 TABLET ORAL
Qty: 120 TABLET | Refills: 0 | Status: SHIPPED | OUTPATIENT
Start: 2022-03-15

## 2022-03-15 RX ADMIN — ATORVASTATIN CALCIUM 40 MG: 40 TABLET, FILM COATED ORAL at 17:07

## 2022-03-15 RX ADMIN — PIPERACILLIN SODIUM AND TAZOBACTAM SODIUM 3.38 G: 3; .375 INJECTION, POWDER, LYOPHILIZED, FOR SOLUTION INTRAVENOUS at 22:01

## 2022-03-15 RX ADMIN — FLUTICASONE FUROATE, UMECLIDINIUM BROMIDE AND VILANTEROL TRIFENATATE 1 PUFF: 200; 62.5; 25 POWDER RESPIRATORY (INHALATION) at 08:17

## 2022-03-15 RX ADMIN — PANTOPRAZOLE SODIUM 40 MG: 40 TABLET, DELAYED RELEASE ORAL at 08:41

## 2022-03-15 RX ADMIN — SUCRALFATE 1 G: 1 TABLET ORAL at 17:07

## 2022-03-15 RX ADMIN — MIRTAZAPINE 45 MG: 30 TABLET, FILM COATED ORAL at 22:01

## 2022-03-15 RX ADMIN — SUCRALFATE 1 G: 1 TABLET ORAL at 08:41

## 2022-03-15 RX ADMIN — DOCUSATE SODIUM 100 MG: 100 CAPSULE, LIQUID FILLED ORAL at 08:41

## 2022-03-15 RX ADMIN — POLYETHYLENE GLYCOL 3350 17 G: 17 POWDER, FOR SOLUTION ORAL at 08:41

## 2022-03-15 RX ADMIN — LEVOTHYROXINE SODIUM 100 MCG: 0.1 TABLET ORAL at 05:57

## 2022-03-15 RX ADMIN — PIPERACILLIN SODIUM AND TAZOBACTAM SODIUM 3.38 G: 3; .375 INJECTION, POWDER, LYOPHILIZED, FOR SOLUTION INTRAVENOUS at 12:11

## 2022-03-15 RX ADMIN — DICLOFENAC 2 G: 10 GEL TOPICAL at 12:11

## 2022-03-15 RX ADMIN — DICLOFENAC 2 G: 10 GEL TOPICAL at 17:07

## 2022-03-15 RX ADMIN — SUCRALFATE 1 G: 1 TABLET ORAL at 12:11

## 2022-03-15 RX ADMIN — SUCRALFATE 1 G: 1 TABLET ORAL at 22:02

## 2022-03-15 RX ADMIN — DOCUSATE SODIUM 100 MG: 100 CAPSULE, LIQUID FILLED ORAL at 22:01

## 2022-03-15 RX ADMIN — DICLOFENAC 2 G: 10 GEL TOPICAL at 22:07

## 2022-03-15 RX ADMIN — FERROUS SULFATE TAB 325 MG (65 MG ELEMENTAL FE) 325 MG: 325 (65 FE) TAB at 08:41

## 2022-03-15 RX ADMIN — MONTELUKAST 10 MG: 10 TABLET, FILM COATED ORAL at 08:41

## 2022-03-15 RX ADMIN — DICLOFENAC 2 G: 10 GEL TOPICAL at 08:42

## 2022-03-15 NOTE — DISCHARGE SUMMARY
Discharge Summary     Patient: [place] Ahumada MRN: [id number]  SSN: [id number]    YOB: 1933  Age: 80 y.o. Sex: female       Admit Date: 3/7/2022    Discharge Date: 3/15/2022      Admission Diagnoses: Dehydration [E86.0]  JORDANA (acute kidney injury) ([facility] [N17.9]    Discharge Diagnoses:   Problem List as of 3/15/2022 Date Reviewed: 3/14/2022          Codes Class Noted - Resolved    Dehydration ICD-10-CM: E86.0  ICD-9-CM: 276.51  3/7/2022 - Present        JORDANA (acute kidney injury) [facility]) ICD-10-CM: N17.9  ICD-9-CM: 584.9  3/7/2022 - Present        Hip pain, chronic, left ICD-10-CM: M25.552, G89.29  ICD-9-CM: 719.45, 338.29  11/17/2017 - Present        Trigger finger, right ICD-10-CM: M65.30  ICD-9-CM: 727.03  Unknown - Present        Osteoarthritis ICD-10-CM: M19.90  ICD-9-CM: 715.90  Unknown - Present        Dyslipidemia ICD-10-CM: E78.5  ICD-9-CM: 272.4  Unknown - Present        Prediabetes ICD-10-CM: R73.03  ICD-9-CM: 790.29  Unknown - Present        Cholelithiasis ICD-10-CM: K80.20  ICD-9-CM: 574.20  Unknown - Present        Cough ICD-10-CM: R05.9  ICD-9-CM: 786.2  Unknown - Present        Osteoporosis ICD-10-CM: M81.0  ICD-9-CM: 733.00  2/18/2016 - Present        Low back pain ICD-10-CM: M54.50  ICD-9-CM: 724.2  2/8/2016 - Present        ACP (advance care planning) ICD-10-CM: Z71.89  ICD-9-CM: V65.49  2/8/2016 - Present        Hypertension ICD-10-CM: I10  ICD-9-CM: 401.9  Unknown - Present        Asthma ICD-10-CM: J45.909  ICD-9-CM: 493.90  Unknown - Present        S/P colonoscopy ICD-10-CM: Z98.890  ICD-9-CM: V45.89  Unknown - Present    Overview Signed 1/18/2016 12:06 PM by Aleksander Valentine MD     vcu                    Discharge Condition: [address] years old patient to the hospital for not feeling well was admitted for volume depletion and acute kidney injury patient has been on ARB and also on Dyazide which were placed on hold.   Patient was hydrated hemoglobin was found to be on a downward trend consult was placed to GI she had upper endoscopy that showed gastritis and esophagitis patient's hemoglobin was about 7.5 he refused any blood transfusion for Yarsani reasons. Patient was placed on Epogen. She is being discharged home with home health PT and OT    Consults: Gastroenterology and Nephrology    Significant Diagnostic Studies: labs:   Recent Results (from the past 24 hour(s))   HGB & HCT    Collection Time: 03/15/22  3:26 PM   Result Value Ref Range    HGB 7.4 (L) 11.5 - 16.0 g/dL    HCT 24.3 (L) 35.0 - 47.0 %         US RETROPERITONEUM COMP   Final Result   Medical renal disease      CT ABD PELV WO CONT   Final Result   1. Cholelithiasis, but no CT evidence of acute cholecystitis. 2. Small bilateral pleural effusions with basilar atelectasis. 3. Atherosclerotic disease. 4. Nonobstructing renal stones. No ureteral or bladder stone. No hydronephrosis. 5. Please see above report for further details. XR CHEST PORT   Final Result   No acute pulmonary process. Disposition: Home health    Discharge Medications:   Current Discharge Medication List      START taking these medications    Details   albuterol sulfate (PROVENTIL;VENTOLIN) 2.5 mg/0.5 mL nebu nebulizer solution 0.5 mL by Nebulization route every four (4) hours as needed for Wheezing. Qty: 30 Nebule, Refills: 0      benzonatate (TESSALON) 100 mg capsule Take 1 Capsule by mouth three (3) times daily as needed for Cough for up to 7 days. Qty: 30 Capsule, Refills: 0      docusate sodium (COLACE) 100 mg capsule Take 1 Capsule by mouth two (2) times a day for 90 days. Qty: 60 Capsule, Refills: 2      epoetin lio-epbx (RETACRIT) 20,000 unit/2 mL injection 1.09 mL by SubCUTAneous route every seven (7) days. Indications: anemia due to kidney failure  Qty: 4 Each, Refills: 0      ferrous sulfate 325 mg (65 mg iron) tablet Take 1 Tablet by mouth daily (with breakfast).   Qty: 30 Tablet, Refills: 0 fluticasone-umeclidin-vilanter (TRELEGY ELLIPTA) 200-62.5-25 mcg inhaler Take 1 Puff by inhalation daily. Qty: 3 Blister, Refills: 0      amoxicillin-clavulanate (Augmentin) 500-125 mg per tablet Take 1 Tablet by mouth every twelve (12) hours. Qty: 10 Tablet, Refills: 0      polyethylene glycol (MIRALAX) 17 gram packet Take 1 Packet by mouth daily. Indications: constipation  Qty: 30 Packet, Refills: 0      sucralfate (CARAFATE) 1 gram tablet Take 1 Tablet by mouth Before breakfast, lunch, dinner and at bedtime. Qty: 120 Tablet, Refills: 0         CONTINUE these medications which have NOT CHANGED    Details   atorvastatin (LIPITOR) 40 mg tablet Take 1 Tablet by mouth every evening. pantoprazole (PROTONIX) 40 mg tablet Take 40 mg by mouth daily. mirtazapine (REMERON) 45 mg tablet Take 1 Tablet by mouth nightly. montelukast (SINGULAIR) 10 mg tablet TAKE 1 TABLET BY MOUTH DAILY  Qty: 30 Tab, Refills: 11      levothyroxine (SYNTHROID) 100 mcg tablet Take 1 tab every day Monday to Friday  Qty: 30 Tab, Refills: 11      diclofenac (Voltaren) 1 % gel Apply  to affected area four (4) times daily.   Qty: 100 g, Refills: 0         STOP taking these medications       triamterene-hydroCHLOROthiazide (DYAZIDE) 37.5-25 mg per capsule Comments:   Reason for Stopping:         losartan (COZAAR) 25 mg tablet Comments:   Reason for Stopping:               Activity: Activity as tolerated  Diet: Cardiac Diet  Wound Care: None needed    Follow-up Appointments   Procedures    FOLLOW UP VISIT Appointment in: 3 - 5 Days     Standing Status:   Standing     Number of Occurrences:   1     Order Specific Question:   Appointment in     Answer:   3 - 5 Days   45 minutes discharge time    Signed By: Dhaval Kelley MD     March 15, 2022

## 2022-03-15 NOTE — PROGRESS NOTES
Asked patient if she had a ride home. Patient stated that her daughter can pick her up after she gets off tomorrow.  I called patient daughter and left a voicemail, waiting for daughter to call back

## 2022-03-15 NOTE — PROGRESS NOTES
Clinical chart reviewed by CM. Patient's discharge plan is to return home with home health services provided by [facility]. CM team will continue to follow.

## 2022-03-15 NOTE — PROGRESS NOTES
Progress Note    Patient: Ann Marie Krishna MRN: [id number]  SSN: [id number]    YOB: 1933  Age: 80 y.o. Sex: female      Admit Date: 3/7/2022    LOS: 8 days     Subjective:   GI in consultation for Anemia     3/15: Patient seen in room awake and alert. Denies pain. Denies overt bleeding. Eating well. HgB 7.5 this am.     EGD 3/14:  esophagitis, gastritis, acute without bleeding, no evidence of bleeding ulcer. Continue present PPI therapy.     History of Present Dilma Hayes a 80 y. o. female who is seen in consultation for Anemia. Ms. Gabino Trevizo states she has been feeling tired and weak for about 3 days but denies nausea, vomiting, diarrhea, or constipation. She reports she has not noticed black tarry stools or bright red rectal bleeding. She states her appetite has decreased but she denies un-intentional weight loss or abdominal pain.  Stool for Occult blood pending. She does not accept Blood.  She was admitted on 3/7/22. She does report she was having dark colored urine. CT scan of abdomen and pelvis on 3/7 shows cholelithiasis but no evidence of acute cholecystitis. Non obstructing renal stones, no ureteral or bladder stones, no hydronephrosis. No focal hepatic lesion identified, there is a calcified stone in the gallbladder, no biliary duct dilation. There is no bowel obstruction. Ms. Gabino Trevizo hgB was 8.9 on admission and has declined to 7.5 this am. 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 She does have a past medical history significant for hypertension, chronic anemia, asthma, thyroid disease, and dyslipidemia.  Plan EGD on Monday if hgB continues to decrease. Iron Panel ferritin pending.     CT abdomen/pelvis 3/7/22: IMPRESSION  1. Cholelithiasis, but no CT evidence of acute cholecystitis. 2. Small bilateral pleural effusions with basilar atelectasis. 3. Atherosclerotic disease. 4. Nonobstructing renal stones. No ureteral or bladder stone.  No hydronephrosis. 5. Please see above report for further details      Objective:     Vitals:    03/14/22 1951 03/15/22 0817 03/15/22 0942 03/15/22 1446   BP: 122/72  109/70 91/60   Pulse: 98  95 (!) 102   Resp: 16  18 18   Temp: 98 °F (36.7 °C)  98.5 °F (36.9 °C) 98.9 °F (37.2 °C)   SpO2: 96% 94% 95% 97%   Weight:       Height:            Intake and Output:  Current Shift: No intake/output data recorded. Last three shifts: 03/13 1901 - 03/15 0700  In: 50 [I.V.:50]  Out: 700 [Urine:700]    Physical Exam:   Skin:  Extremities and face reveal no rashes. No barrera erythema. HEENT: Sclerae anicteric. Extra-occular muscles are intact. No abnormal pigmentation of the lips. The neck is supple. Cardiovascular: Regular rate and rhythm. Respiratory:  Comfortable breathing with no accessory muscle use. GI:  Abdomen nondistended, soft, and nontender. No enlargement of the liver or spleen. No masses palpable. Rectal:  Deferred  Musculoskeletal: Extremities have good range of motion. Neurological:  Gross memory appears intact. Patient is alert and oriented. Psychiatric:  Mood appears appropriate with judgement intact. Lymphatic:  No visible adenopathy      Lab/Data Review:  Recent Results (from the past 24 hour(s))   HGB & HCT    Collection Time: 03/15/22  3:26 PM   Result Value Ref Range    HGB 7.4 (L) 11.5 - 16.0 g/dL    HCT 24.3 (L) 35.0 - 47.0 %              US RETROPERITONEUM COMP   Final Result   Medical renal disease      CT ABD PELV WO CONT   Final Result   1. Cholelithiasis, but no CT evidence of acute cholecystitis. 2. Small bilateral pleural effusions with basilar atelectasis. 3. Atherosclerotic disease. 4. Nonobstructing renal stones. No ureteral or bladder stone. No hydronephrosis. 5. Please see above report for further details. XR CHEST PORT   Final Result   No acute pulmonary process.              Assessment:     Active Problems:    Dehydration (3/7/2022)      JORDANA (acute kidney injury) ([address] (3/7/2022)        Plan:   1. Anemia    hgb today 7.4 Monitor. Patient does not accept blood transfusion.     Pantoprazole 40 mg daily    Iron Panel in the am    Ferritin in the am    Ferrous sulfate 325 mg daily    S/P EGD 3/14: esophagitis, gastritis, acute without bleeding, no evidence of bleeding ulcer. Continue present PPI therapy. 2. Hyperlipidemia    Continue Lipitor   3. Asthma     Continue Singulair      Continue Trelegy  4. GERD     Sucralfate QID  5. Leukocytosis (trending down)     Continue IV Zosyn     UA shows Negative bacteria, Negative nitrites,     Urine culture pending  6. JORDANA     Nephrology input appreciated    Patient discussed with Dr Mena Vasques and agrees to above impression and plan. Thank you for allowing me to participate in this patients care    Signed By: Antonina Arrington.  CHAY Yeboah     March 15, 2022

## 2022-03-15 NOTE — PROGRESS NOTES
Dr Noam Eli is aware patient will be leaving for home in the morning. Daughter works nights until 1 am and no one is home to receive the patient.

## 2022-03-16 VITALS
BODY MASS INDEX: 20.49 KG/M2 | RESPIRATION RATE: 18 BRPM | SYSTOLIC BLOOD PRESSURE: 111 MMHG | HEIGHT: 64 IN | OXYGEN SATURATION: 96 % | HEART RATE: 94 BPM | TEMPERATURE: 97.5 F | DIASTOLIC BLOOD PRESSURE: 63 MMHG | WEIGHT: 120 LBS

## 2022-03-16 PROCEDURE — 94640 AIRWAY INHALATION TREATMENT: CPT

## 2022-03-16 PROCEDURE — 74011250637 HC RX REV CODE- 250/637: Performed by: NURSE PRACTITIONER

## 2022-03-16 PROCEDURE — 74011250637 HC RX REV CODE- 250/637: Performed by: INTERNAL MEDICINE

## 2022-03-16 RX ADMIN — PANTOPRAZOLE SODIUM 40 MG: 40 TABLET, DELAYED RELEASE ORAL at 11:00

## 2022-03-16 RX ADMIN — POLYETHYLENE GLYCOL 3350 17 G: 17 POWDER, FOR SOLUTION ORAL at 10:59

## 2022-03-16 RX ADMIN — DOCUSATE SODIUM 100 MG: 100 CAPSULE, LIQUID FILLED ORAL at 11:00

## 2022-03-16 RX ADMIN — LEVOTHYROXINE SODIUM 100 MCG: 0.1 TABLET ORAL at 06:00

## 2022-03-16 RX ADMIN — SUCRALFATE 1 G: 1 TABLET ORAL at 10:59

## 2022-03-16 RX ADMIN — FLUTICASONE FUROATE, UMECLIDINIUM BROMIDE AND VILANTEROL TRIFENATATE 1 PUFF: 200; 62.5; 25 POWDER RESPIRATORY (INHALATION) at 08:31

## 2022-03-16 RX ADMIN — MONTELUKAST 10 MG: 10 TABLET, FILM COATED ORAL at 11:01

## 2022-03-16 RX ADMIN — FERROUS SULFATE TAB 325 MG (65 MG ELEMENTAL FE) 325 MG: 325 (65 FE) TAB at 11:00

## 2022-03-16 NOTE — PROGRESS NOTES
Patient will be discharging home with home health services provided by [facility] today. [facility] 03/17/2022. Patient is clear to discharge home with home health services by [facility]. Nurse is aware.

## 2022-03-16 NOTE — PROGRESS NOTES
I have reviewed discharge instructions with Ms. Barnett. Nurse removed IV access, reviewed medication list, and follow up appointments with the patient. Patient transportation provided by family member in there personal vehicle. Patient is being discharge to home to home health services provided by [facility] per doctor's order. Patient understood and verbalized understanding of all discharge orders. Discharge plan of care/case management plan validated with provider discharge order.

## 2022-03-16 NOTE — PROGRESS NOTES
Medicare patient has received, reviewed, and signed 2nd IMM letter informing them of their right to appeal the discharge. Signed copied has been placed on patient's bedside chart.

## 2022-03-19 PROBLEM — G89.29 HIP PAIN, CHRONIC, LEFT: Status: ACTIVE | Noted: 2017-11-17

## 2022-03-19 PROBLEM — E86.0 DEHYDRATION: Status: ACTIVE | Noted: 2022-03-07

## 2022-03-19 PROBLEM — N17.9 AKI (ACUTE KIDNEY INJURY) (HCC): Status: ACTIVE | Noted: 2022-03-07

## 2022-03-19 PROBLEM — M25.552 HIP PAIN, CHRONIC, LEFT: Status: ACTIVE | Noted: 2017-11-17

## 2022-04-06 PROBLEM — E86.0 DEHYDRATION: Status: RESOLVED | Noted: 2022-03-07 | Resolved: 2022-04-06

## 2022-04-12 ENCOUNTER — HOSPITAL ENCOUNTER (EMERGENCY)
Age: 87
Discharge: HOME | End: 2022-04-12
Attending: EMERGENCY MEDICINE | Admitting: EMERGENCY MEDICINE
Payer: MEDICARE

## 2022-04-12 VITALS
HEART RATE: 100 BPM | HEIGHT: 63 IN | SYSTOLIC BLOOD PRESSURE: 185 MMHG | DIASTOLIC BLOOD PRESSURE: 101 MMHG | TEMPERATURE: 98.1 F | OXYGEN SATURATION: 96 % | BODY MASS INDEX: 21.25 KG/M2 | WEIGHT: 119.93 LBS | RESPIRATION RATE: 17 BRPM

## 2022-04-12 DIAGNOSIS — I10 ASYMPTOMATIC HYPERTENSION: Primary | ICD-10-CM

## 2022-04-12 PROCEDURE — 99283 EMERGENCY DEPT VISIT LOW MDM: CPT

## 2022-04-12 NOTE — DISCHARGE INSTRUCTIONS
You were seen in the ER for your high blood pressure and confusion over which pills to take. Thankfully,  you are not having any symptoms of dangerous high blood pressure like severe headaches with sweating, weakness or numbness on one side, chest pain, or difficulty breathing. You should STOP taking your blood pressure medications - these are LOSARTAN and TRIAMTERENE-HYDROCHLOROTHIAZIDE. Their trade names are [facility] and Patricia Berrios. DO NOT TAKE THESE MEDICATIONS. These medications hurt your kidneys last month and made you dehydrated. Dr. Rosanne Odonnell does not want you taking them any more until you follow up with him. Try to see him this week if possible but within the next month at the latest so he can figure out how to best manage your medications and blood pressure. Make sure you are drinking plenty of water so you do not get dehydrated and hurt your kidneys again. RETURN to the ER if you do begin experiencing any of the symptoms of severe blood pressure - severe headache with sweating and confusion, weakness or numbness on one side, difficulty breathing, chest pain or ANY other new or concerning symptoms. Thank you! Thank you for allowing me to care for you in the emergency department. I sincerely hope that you are satisfied with your visit today. It is my goal to provide you with excellent care. Below you will find a list of your labs and imaging from your visit today. Should you have any questions regarding these results please do not hesitate to call the emergency department. Labs -   No results found for this or any previous visit (from the past 12 hour(s)). Radiologic Studies -   No orders to display     CT Results  (Last 48 hours)      None          CXR Results  (Last 48 hours)      None               If you feel that you have not received excellent quality care or timely care, please ask to speak to the nurse manager. Please choose us in the future for your continued health care needs. ------------------------------------------------------------------------------------------------------------  The exam and treatment you received in the Emergency Department were for an urgent problem and are not intended as complete care. It is important that you follow-up with a doctor, nurse practitioner, or physician assistant to:  (1) confirm your diagnosis,  (2) re-evaluation of changes in your illness and treatment, and  (3) for ongoing care. If your symptoms become worse or you do not improve as expected and you are unable to reach your usual health care provider, you should return to the Emergency Department. We are available 24 hours a day. Please take your discharge instructions with you when you go to your follow-up appointment. If you have any problem arranging a follow-up appointment, contact the Emergency Department immediately. If a prescription has been provided, please have it filled as soon as possible to prevent a delay in treatment. Read the entire medication instruction sheet provided to you by the pharmacy. If you have any questions or reservations about taking the medication due to side effects or interactions with other medications, please call your primary care physician or contact the ER to speak with the charge nurse. Make an appointment with your family doctor or the physician you were referred to for follow-up of this visit as instructed on your discharge paperwork, as this is a mandatory follow-up. Return to the ER if you are unable to be seen or if you are unable to be seen in a timely manner. If you have any problem arranging the follow-up visit, contact the Emergency Department immediately.

## 2022-04-12 NOTE — ED PROVIDER NOTES
EMERGENCY DEPARTMENT HISTORY AND PHYSICAL EXAM      Date: 4/12/2022  Patient Name: Macho Palm      History of Presenting Illness     Chief Complaint   Patient presents with    Hypertension       History Provided By: Patient    HPI: Macho Palm, 80 y.o. female with a past medical history significant for Hypothyroidism, HTN, recent JORDANA presents to the ED with cc of hypertension. Pt states she was unsure of which anti-hypertension pills to take and when and daughter was concerned so sent her in for evaluation. Denies HA, diaphoresis, CP, SOB, focal weakness, numbness, tingling. Eating and drinking normally, has been in usual state of health. Has no complaints just did not know what pills to take and when. There are no other complaints, changes, or physical findings at this time. PCP: Gregorio Saha MD    Current Outpatient Medications   Medication Sig Dispense Refill    albuterol sulfate (PROVENTIL;VENTOLIN) 2.5 mg/0.5 mL nebu nebulizer solution 0.5 mL by Nebulization route every four (4) hours as needed for Wheezing. 30 Nebule 0    docusate sodium (COLACE) 100 mg capsule Take 1 Capsule by mouth two (2) times a day for 90 days. 60 Capsule 2    epoetin lio-epbx (RETACRIT) 20,000 unit/2 mL injection 1.09 mL by SubCUTAneous route every seven (7) days. Indications: anemia due to kidney failure 4 Each 0    ferrous sulfate 325 mg (65 mg iron) tablet Take 1 Tablet by mouth daily (with breakfast). 30 Tablet 0    fluticasone-umeclidin-vilanter (TRELEGY ELLIPTA) 200-62.5-25 mcg inhaler Take 1 Puff by inhalation daily. 3 Blister 0    amoxicillin-clavulanate (Augmentin) 500-125 mg per tablet Take 1 Tablet by mouth every twelve (12) hours. 10 Tablet 0    polyethylene glycol (MIRALAX) 17 gram packet Take 1 Packet by mouth daily. Indications: constipation 30 Packet 0    sucralfate (CARAFATE) 1 gram tablet Take 1 Tablet by mouth Before breakfast, lunch, dinner and at bedtime.  120 Tablet 0    atorvastatin (LIPITOR) 40 mg tablet Take 1 Tablet by mouth every evening.  pantoprazole (PROTONIX) 40 mg tablet Take 40 mg by mouth daily.  mirtazapine (REMERON) 45 mg tablet Take 1 Tablet by mouth nightly.  diclofenac (Voltaren) 1 % gel Apply  to affected area four (4) times daily. 100 g 0    montelukast (SINGULAIR) 10 mg tablet TAKE 1 TABLET BY MOUTH DAILY 30 Tab 11    levothyroxine (SYNTHROID) 100 mcg tablet Take 1 tab every day Monday to Friday 30 Tab 11       Past History     Past Medical History:  Past Medical History:   Diagnosis Date    Asthma     Bereavement 3/30/16    65 yo con  - multiple myleoma     Cholelithiasis     Cough     Dyslipidemia     Hip pain, chronic, left 2017    Hypertension     Low back pain 16    Osteoarthritis     Osteoporosis 16    Prediabetes     S/P colonoscopy 7/15    [facility]    Trigger finger, right 2017    Wedge compression fx of second thoracic vertebra with routine healing 16    t11       Past Surgical History:  Past Surgical History:   Procedure Laterality Date    HX COLONOSCOPY      HX GYN         Family History:  Family History   Family history unknown: Yes   Problem Relation Age of Onset    Family history unknown: Yes    Cancer Son        Social History:  Social History     Tobacco Use    Smoking status: Never Smoker    Smokeless tobacco: Never Used   Vaping Use    Vaping Use: Never used   Substance Use Topics    Alcohol use: Not Currently    Drug use: Never       Allergies: Allergies   Allergen Reactions    Crestor [Rosuvastatin] Swelling         Review of Systems   Constitutional: Negative except as in HPI. Eyes: Negative except as in HPI.  ENT: Negative except as in HPI. Cardiovascular: Negative except as in HPI. Respiratory: Negative except as in HPI. Gastrointestinal: Negative except as in HPI. Genitourinary: Negative except as in HPI. Musculoskeletal: Negative except as in HPI.   Integumentary: Negative except as in HPI. Neurological: Negative except as in HPI. Psychiatric: Negative except as in HPI. Endocrine: Negative except as in HPI. Hematologic/Lymphatic: Negative except as in HPI. Allergic/Immunologic: Negative except as in HPI. Physical Exam   Constitutional: Awake and alert, interactive, NAD  Eyes: PERRL, no injection or scleral icterus, no discharge  HEENT: NCAT, neck supple, MMM, no oropharyngeal exudates  CV: RRR, no m/r/g  Respiratory: CTAB, no r/r/w  GI: Abd soft, nondistended, nontender  : Deferred  MSK: FROM, no joint effusions or edema  Skin: No rashes  Neuro: CN2-12 intact, symmetric facies, fluent speech. SILT distally. MAEE. Psych: Well-groomed, normal speech, behavior, appropriate mood    Lab and Diagnostic Study Results     Labs -   No results found for this or any previous visit (from the past 12 hour(s)). Radiologic Studies -   [unfilled]  CT Results  (Last 48 hours)    None        CXR Results  (Last 48 hours)    None          Medical Decision Making and ED Course   - I am the first and primary provider for this patient AND AM THE PRIMARY PROVIDER OF RECORD. - I reviewed the vital signs, available nursing notes, past medical history, past surgical history, family history and social history. - Initial assessment performed. The patients presenting problems have been discussed, and the staff are in agreement with the care plan formulated and outlined with them. I have encouraged them to ask questions as they arise throughout their visit. Vital Signs-Reviewed the patient's vital signs. Patient Vitals for the past 12 hrs:   Temp Pulse Resp BP SpO2   04/12/22 0724 98.1 °F (36.7 °C) 100 17 (!) 185/101 96 %       EKG interpretation:         Provider Notes (Medical Decision Making):   88F w/asymptomatic HTN. No neuro or cardiac sx. Per Chart review, anti-hypertensive meds stopped last month due to JORDANA.  Will tell pt to discontinue losartan and dyazide (triamterene-HCTZ) and f/u with Dr. Myke Espinoza this week for continued HTN and medication management. Dispo home w/return precautions. ED Course:                  Disposition     Disposition: DC- Adult Discharges: All of the diagnostic tests were reviewed and questions answered. Diagnosis, care plan and treatment options were discussed. The patient understands the instructions and will follow up as directed. The patients results have been reviewed with them. They have been counseled regarding their diagnosis. The patient verbally convey understanding and agreement of the signs, symptoms, diagnosis, treatment and prognosis and additionally agrees to follow up as recommended with their PCP in 24 - 48 hours. They also agree with the care-plan and convey that all of their questions have been answered. I have also put together some discharge instructions for them that include: 1) educational information regarding their diagnosis, 2) how to care for their diagnosis at home, as well a 3) list of reasons why they would want to return to the ED prior to their follow-up appointment, should their condition change. Discharged      Diagnosis     Clinical Impression:   1. Asymptomatic hypertension        Attestations:     Trish Sauceda MD

## 2022-04-12 NOTE — ED TRIAGE NOTES
Pt arrives via ems with [facility] after family was concerned about her blood pressure. She takes bp meds that she cannot remember the name. Patient reports they switched her meds and she may not have taken it yesterday.    She is not symptomatic and denies headache, blurred vision n/v

## 2022-06-02 ENCOUNTER — HOSPITAL ENCOUNTER (OUTPATIENT)
Dept: GENERAL RADIOLOGY | Age: 87
Discharge: HOME | End: 2022-06-02
Payer: MEDICARE

## 2022-06-02 ENCOUNTER — TRANSCRIBE ORDER (OUTPATIENT)
Dept: GENERAL RADIOLOGY | Age: 87
End: 2022-06-02

## 2022-06-02 DIAGNOSIS — R07.9 CHEST PAIN: ICD-10-CM

## 2022-06-02 DIAGNOSIS — R07.9 CHEST PAIN: Primary | ICD-10-CM

## 2022-06-02 PROCEDURE — 71046 X-RAY EXAM CHEST 2 VIEWS: CPT

## 2022-09-12 ENCOUNTER — HOSPITAL ENCOUNTER (EMERGENCY)
Age: 87
Discharge: HOME | End: 2022-09-12
Attending: STUDENT IN AN ORGANIZED HEALTH CARE EDUCATION/TRAINING PROGRAM | Admitting: STUDENT IN AN ORGANIZED HEALTH CARE EDUCATION/TRAINING PROGRAM
Payer: MEDICARE

## 2022-09-12 ENCOUNTER — APPOINTMENT (OUTPATIENT)
Dept: GENERAL RADIOLOGY | Age: 87
End: 2022-09-12
Attending: STUDENT IN AN ORGANIZED HEALTH CARE EDUCATION/TRAINING PROGRAM
Payer: MEDICARE

## 2022-09-12 VITALS
HEART RATE: 99 BPM | WEIGHT: 119 LBS | TEMPERATURE: 97.9 F | RESPIRATION RATE: 19 BRPM | DIASTOLIC BLOOD PRESSURE: 88 MMHG | OXYGEN SATURATION: 96 % | HEIGHT: 63 IN | BODY MASS INDEX: 21.09 KG/M2 | SYSTOLIC BLOOD PRESSURE: 130 MMHG

## 2022-09-12 DIAGNOSIS — J45.21 MILD INTERMITTENT ASTHMA WITH ACUTE EXACERBATION: Primary | ICD-10-CM

## 2022-09-12 DIAGNOSIS — T14.8XXA CONTUSION OF SOFT TISSUE: ICD-10-CM

## 2022-09-12 LAB
ALBUMIN SERPL-MCNC: 2.8 G/DL (ref 3.5–5)
ALBUMIN/GLOB SERPL: 0.6 {RATIO} (ref 1.1–2.2)
ALP SERPL-CCNC: 82 U/L (ref 45–117)
ALT SERPL-CCNC: 11 U/L (ref 12–78)
ANION GAP SERPL CALC-SCNC: 7 MMOL/L (ref 5–15)
AST SERPL W P-5'-P-CCNC: 16 U/L (ref 15–37)
ATRIAL RATE: 111 BPM
BASOPHILS # BLD: 0 K/UL (ref 0–0.1)
BASOPHILS NFR BLD: 0 % (ref 0–1)
BILIRUB SERPL-MCNC: 0.8 MG/DL (ref 0.2–1)
BUN SERPL-MCNC: 21 MG/DL (ref 6–20)
BUN/CREAT SERPL: 14 (ref 12–20)
CA-I BLD-MCNC: 8.5 MG/DL (ref 8.5–10.1)
CALCULATED R AXIS, ECG10: 67 DEGREES
CALCULATED T AXIS, ECG11: 86 DEGREES
CHLORIDE SERPL-SCNC: 104 MMOL/L (ref 97–108)
CO2 SERPL-SCNC: 25 MMOL/L (ref 21–32)
COVID-19 RAPID TEST, COVR: NOT DETECTED
CREAT SERPL-MCNC: 1.47 MG/DL (ref 0.55–1.02)
DIAGNOSIS, 93000: NORMAL
DIFFERENTIAL METHOD BLD: ABNORMAL
EOSINOPHIL # BLD: 3.5 K/UL (ref 0–0.4)
EOSINOPHIL NFR BLD: 31 % (ref 0–7)
ERYTHROCYTE [DISTWIDTH] IN BLOOD BY AUTOMATED COUNT: 14.7 % (ref 11.5–14.5)
GLOBULIN SER CALC-MCNC: 4.6 G/DL (ref 2–4)
GLUCOSE SERPL-MCNC: 108 MG/DL (ref 65–100)
HCT VFR BLD AUTO: 29.6 % (ref 35–47)
HGB BLD-MCNC: 9.3 G/DL (ref 11.5–16)
IMM GRANULOCYTES # BLD AUTO: 0.1 K/UL (ref 0–0.04)
IMM GRANULOCYTES NFR BLD AUTO: 1 % (ref 0–0.5)
LYMPHOCYTES # BLD: 1.8 K/UL (ref 0.8–3.5)
LYMPHOCYTES NFR BLD: 16 % (ref 12–49)
MCH RBC QN AUTO: 27.3 PG (ref 26–34)
MCHC RBC AUTO-ENTMCNC: 31.4 G/DL (ref 30–36.5)
MCV RBC AUTO: 86.8 FL (ref 80–99)
MONOCYTES # BLD: 0.5 K/UL (ref 0–1)
MONOCYTES NFR BLD: 4 % (ref 5–13)
NEUTS SEG # BLD: 5.6 K/UL (ref 1.8–8)
NEUTS SEG NFR BLD: 48 % (ref 32–75)
NRBC # BLD: 0 K/UL (ref 0–0.01)
NRBC BLD-RTO: 0 PER 100 WBC
PLATELET # BLD AUTO: 295 K/UL (ref 150–400)
PMV BLD AUTO: 10.9 FL (ref 8.9–12.9)
POTASSIUM SERPL-SCNC: 3.8 MMOL/L (ref 3.5–5.1)
PROT SERPL-MCNC: 7.4 G/DL (ref 6.4–8.2)
Q-T INTERVAL, ECG07: 326 MS
QRS DURATION, ECG06: 74 MS
QTC CALCULATION (BEZET), ECG08: 444 MS
RBC # BLD AUTO: 3.41 M/UL (ref 3.8–5.2)
SODIUM SERPL-SCNC: 136 MMOL/L (ref 136–145)
TROPONIN-HIGH SENSITIVITY: 8 NG/L (ref 0–51)
TROPONIN-HIGH SENSITIVITY: 9 NG/L (ref 0–51)
VENTRICULAR RATE, ECG03: 112 BPM
WBC # BLD AUTO: 11.5 K/UL (ref 3.6–11)

## 2022-09-12 PROCEDURE — 73080 X-RAY EXAM OF ELBOW: CPT

## 2022-09-12 PROCEDURE — 71045 X-RAY EXAM CHEST 1 VIEW: CPT

## 2022-09-12 PROCEDURE — 93005 ELECTROCARDIOGRAM TRACING: CPT

## 2022-09-12 PROCEDURE — 94640 AIRWAY INHALATION TREATMENT: CPT

## 2022-09-12 PROCEDURE — 99285 EMERGENCY DEPT VISIT HI MDM: CPT

## 2022-09-12 PROCEDURE — 85025 COMPLETE CBC W/AUTO DIFF WBC: CPT

## 2022-09-12 PROCEDURE — 74011250636 HC RX REV CODE- 250/636: Performed by: STUDENT IN AN ORGANIZED HEALTH CARE EDUCATION/TRAINING PROGRAM

## 2022-09-12 PROCEDURE — 80053 COMPREHEN METABOLIC PANEL: CPT

## 2022-09-12 PROCEDURE — 84484 ASSAY OF TROPONIN QUANT: CPT

## 2022-09-12 PROCEDURE — 74011636637 HC RX REV CODE- 636/637: Performed by: STUDENT IN AN ORGANIZED HEALTH CARE EDUCATION/TRAINING PROGRAM

## 2022-09-12 PROCEDURE — 96374 THER/PROPH/DIAG INJ IV PUSH: CPT

## 2022-09-12 PROCEDURE — 87635 SARS-COV-2 COVID-19 AMP PRB: CPT

## 2022-09-12 PROCEDURE — 36415 COLL VENOUS BLD VENIPUNCTURE: CPT

## 2022-09-12 PROCEDURE — 74011000250 HC RX REV CODE- 250: Performed by: STUDENT IN AN ORGANIZED HEALTH CARE EDUCATION/TRAINING PROGRAM

## 2022-09-12 RX ORDER — PREDNISONE 20 MG/1
40 TABLET ORAL DAILY
Qty: 10 TABLET | Refills: 0 | Status: SHIPPED | OUTPATIENT
Start: 2022-09-13 | End: 2022-09-18

## 2022-09-12 RX ORDER — IPRATROPIUM BROMIDE AND ALBUTEROL SULFATE 2.5; .5 MG/3ML; MG/3ML
3 SOLUTION RESPIRATORY (INHALATION)
Status: ACTIVE | OUTPATIENT
Start: 2022-09-12 | End: 2022-09-12

## 2022-09-12 RX ORDER — IPRATROPIUM BROMIDE AND ALBUTEROL SULFATE 2.5; .5 MG/3ML; MG/3ML
3 SOLUTION RESPIRATORY (INHALATION)
Status: COMPLETED | OUTPATIENT
Start: 2022-09-12 | End: 2022-09-12

## 2022-09-12 RX ORDER — MAGNESIUM SULFATE HEPTAHYDRATE 40 MG/ML
2 INJECTION, SOLUTION INTRAVENOUS
Status: COMPLETED | OUTPATIENT
Start: 2022-09-12 | End: 2022-09-12

## 2022-09-12 RX ORDER — PREDNISONE 20 MG/1
40 TABLET ORAL ONCE
Status: COMPLETED | OUTPATIENT
Start: 2022-09-12 | End: 2022-09-12

## 2022-09-12 RX ADMIN — PREDNISONE 40 MG: 20 TABLET ORAL at 05:30

## 2022-09-12 RX ADMIN — IPRATROPIUM BROMIDE AND ALBUTEROL SULFATE 3 ML: .5; 2.5 SOLUTION RESPIRATORY (INHALATION) at 05:46

## 2022-09-12 RX ADMIN — IPRATROPIUM BROMIDE AND ALBUTEROL SULFATE 3 ML: .5; 2.5 SOLUTION RESPIRATORY (INHALATION) at 05:47

## 2022-09-12 RX ADMIN — IPRATROPIUM BROMIDE AND ALBUTEROL SULFATE 3 ML: .5; 2.5 SOLUTION RESPIRATORY (INHALATION) at 05:48

## 2022-09-12 RX ADMIN — MAGNESIUM SULFATE HEPTAHYDRATE 2 G: 40 INJECTION, SOLUTION INTRAVENOUS at 07:53

## 2022-09-12 NOTE — DISCHARGE INSTRUCTIONS
Thank you! Thank you for allowing me to care for you in the emergency department. I sincerely hope that you are satisfied with your visit today. It is my goal to provide you with excellent care. Below you will find a list of your labs and imaging from your visit today if applicable. Should you have any questions regarding these results please do not hesitate to call the emergency department. Please review [employer] for a more detailed result list since the below list may not be comprehensive. Instructions on how to sign up to [facility] should be provided in this packet. Labs -     Recent Results (from the past 12 hour(s))   METABOLIC PANEL, COMPREHENSIVE    Collection Time: 09/12/22  5:08 AM   Result Value Ref Range    Sodium 136 136 - 145 mmol/L    Potassium 3.8 3.5 - 5.1 mmol/L    Chloride 104 97 - 108 mmol/L    CO2 25 21 - 32 mmol/L    Anion gap 7 5 - 15 mmol/L    Glucose 108 (H) 65 - 100 mg/dL    BUN 21 (H) 6 - 20 mg/dL    Creatinine 1.47 (H) 0.55 - 1.02 mg/dL    BUN/Creatinine ratio 14 12 - 20      GFR est AA 41 (L) >60 ml/min/1.73m2    GFR est non-AA 34 (L) >60 ml/min/1.73m2    Calcium 8.5 8.5 - 10.1 mg/dL    Bilirubin, total 0.8 0.2 - 1.0 mg/dL    AST (SGOT) 16 15 - 37 U/L    ALT (SGPT) 11 (L) 12 - 78 U/L    Alk.  phosphatase 82 45 - 117 U/L    Protein, total 7.4 6.4 - 8.2 g/dL    Albumin 2.8 (L) 3.5 - 5.0 g/dL    Globulin 4.6 (H) 2.0 - 4.0 g/dL    A-G Ratio 0.6 (L) 1.1 - 2.2     TROPONIN-HIGH SENSITIVITY    Collection Time: 09/12/22  5:08 AM   Result Value Ref Range    Troponin-High Sensitivity 9 0 - 51 ng/L   COVID-19 RAPID TEST    Collection Time: 09/12/22  5:44 AM   Result Value Ref Range    COVID-19 rapid test Not Detected Not Detected     CBC WITH AUTOMATED DIFF    Collection Time: 09/12/22  5:45 AM   Result Value Ref Range    WBC 11.5 (H) 3.6 - 11.0 K/uL    RBC 3.41 (L) 3.80 - 5.20 M/uL    HGB 9.3 (L) 11.5 - 16.0 g/dL    HCT 29.6 (L) 35.0 - 47.0 %    MCV 86.8 80.0 - 99.0 FL    MCH 27.3 26.0 - 34.0 PG    MCHC 31.4 30.0 - 36.5 g/dL    RDW 14.7 (H) 11.5 - 14.5 %    PLATELET 480 247 - 869 K/uL    MPV 10.9 8.9 - 12.9 FL    NRBC 0.0 0.0  WBC    ABSOLUTE NRBC 0.00 0.00 - 0.01 K/uL    NEUTROPHILS 48 32 - 75 %    LYMPHOCYTES 16 12 - 49 %    MONOCYTES 4 (L) 5 - 13 %    EOSINOPHILS 31 (H) 0 - 7 %    BASOPHILS 0 0 - 1 %    IMMATURE GRANULOCYTES 1 (H) 0 - 0.5 %    ABS. NEUTROPHILS 5.6 1.8 - 8.0 K/UL    ABS. LYMPHOCYTES 1.8 0.8 - 3.5 K/UL    ABS. MONOCYTES 0.5 0.0 - 1.0 K/UL    ABS. EOSINOPHILS 3.5 (H) 0.0 - 0.4 K/UL    ABS. BASOPHILS 0.0 0.0 - 0.1 K/UL    ABS. IMM. GRANS. 0.1 (H) 0.00 - 0.04 K/UL    DF AUTOMATED     TROPONIN-HIGH SENSITIVITY    Collection Time: 09/12/22  6:49 AM   Result Value Ref Range    Troponin-High Sensitivity 8 0 - 51 ng/L       Radiologic Studies -   XR CHEST PORT   Final Result   No acute process. Stable chronic small right pleural effusion versus   pleural thickening. XR ELBOW LT MIN 3 V   Final Result   No acute abnormality. CT Results  (Last 48 hours)      None          CXR Results  (Last 48 hours)                 09/12/22 0520  XR CHEST PORT Final result    Impression:  No acute process. Stable chronic small right pleural effusion versus   pleural thickening. Narrative:  EXAM:  CR chest portable       INDICATION: Cough       COMPARISON: 6/2/2022. TECHNIQUE: Portable AP semierect right chest view at 0520 hours       FINDINGS: The cardiomediastinal contours are stable. There is a stable chronic   small right pleural effusion versus pleural thickening. The lungs and left   pleural space are clear. There is no pneumothorax. The bones and upper abdomen   are stable. If you feel that you have not received excellent quality care or timely care, please ask to speak to the nurse manager. Please choose us in the future for your continued health care needs. ------------------------------------------------------------------------------------------------------------  The exam and treatment you received in the Emergency Department were for an urgent problem and are not intended as complete care. It is important that you follow-up with a doctor, nurse practitioner, or physician assistant to:  (1) confirm your diagnosis,  (2) re-evaluation of changes in your illness and treatment, and  (3) for ongoing care. If your symptoms become worse or you do not improve as expected and you are unable to reach your usual health care provider, you should return to the Emergency Department. We are available 24 hours a day. Please take your discharge instructions with you when you go to your follow-up appointment. If a prescription has been provided, please have it filled as soon as possible to prevent a delay in treatment. Read the entire medication instruction sheet provided to you by the pharmacy. If you have any questions or reservations about taking the medication due to side effects or interactions with other medications, please call your primary care physician or contact the ER to speak with the charge nurse. Make an appointment with your family doctor or the physician you were referred to for follow-up of this visit as instructed on your discharge paperwork, as this is a mandatory follow-up. Return to the ER if you are unable to be seen or if you are unable to be seen in a timely manner. If you have any problem arranging the follow-up visit, contact the Emergency Department immediately.

## 2022-09-12 NOTE — ED PROVIDER NOTES
[id number]  EMERGENCY DEPARTMENT ENCOUNTER NOTE    Date: 2022  Patient Name: Madeline Bae    History of Presenting Illness     Chief Complaint   Patient presents with    Arm Pain     HPI: Madeline Bae, 80 y.o. female with a past medical history and outpatient medications as listed and reviewed below  presents for left elbow pain. She had acute onset aching in the left elbow radiating to the entire hand while she was sleeping that woke her up from sleep. The patient reports that the aching lasted for \"a while\" and resolved. Currently the patient does not have any pain. She reports that it has happened in the past and happens on both sides. She has a history of arthritis. No acute trauma. No weakness or numbness in the arm. In the aching has resolved. She denies any chest pain however reports intermittent shortness of breath, no new shortness of breath or chest pain with the elbow pain however. No palpitations, lightheadedness, dizziness, or syncope. No diaphoresis. Currently, the patient appears to be labored while talking, she reports that she usually has that. She has a history of asthma and is currently wheezing. She is not using her inhalers at [facility].     Medical History   I reviewed the medical, surgical, family, and social history, as well as allergies:    PCP: Korey Arrington MD    Past Medical History:  Past Medical History:   Diagnosis Date    Asthma     Bereavement 3/30/16    65 yo con  - multiple myleoma     Cholelithiasis     Cough     Dyslipidemia     Hip pain, chronic, left 2017    Hypertension     Low back pain -    Osteoarthritis     Osteoporosis 2-18-16    Prediabetes     S/P colonoscopy 7/15    vcu    Trigger finger, right 2017    Wedge compression fx of second thoracic vertebra with routine healing 16    t11     Past Surgical History:  Past Surgical History:   Procedure Laterality Date    HX COLONOSCOPY HX GYN       Current Outpatient Medications:  Current Outpatient Medications   Medication Instructions    albuterol sulfate (PROVENTIL;VENTOLIN) 2.5 mg, Nebulization, EVERY 4 HOURS AS NEEDED    albuterol sulfate 90 mcg/actuation aebs 1-2 Puffs, Inhalation, EVERY 4 HOURS AS NEEDED    amoxicillin-clavulanate (Augmentin) 500-125 mg per tablet 1 Tablet, Oral, EVERY 12 HOURS    atorvastatin (LIPITOR) 40 mg tablet 1 Tablet, Oral, EVERY EVENING    diclofenac (Voltaren) 1 % gel Topical, 4 TIMES DAILY    epoetin lio-epbx (RETACRIT) 200 Units/kg, SubCUTAneous, EVERY 7 DAYS    ferrous sulfate 325 mg, Oral, DAILY WITH BREAKFAST    fluticasone-umeclidin-vilanter (TRELEGY ELLIPTA) 200-62.5-25 mcg inhaler 1 Puff, Inhalation, DAILY    levothyroxine (SYNTHROID) 100 mcg tablet Take 1 tab every day Monday to Friday    mirtazapine (REMERON) 45 mg tablet 1 Tablet, Oral, EVERY BEDTIME    montelukast (SINGULAIR) 10 mg tablet TAKE 1 TABLET BY MOUTH DAILY    pantoprazole (PROTONIX) 40 mg, Oral, DAILY    polyethylene glycol (MIRALAX) 17 g, Oral, DAILY    [START ON 9/13/2022] predniSONE (DELTASONE) 40 mg, Oral, DAILY, With Breakfast    sucralfate (CARAFATE) 1 g, Oral, 4 TIMES DAILY BEFORE MEALS & NIGHTLY      Family History:  Family History   Family history unknown: Yes   Problem Relation Age of Onset    Family history unknown: Yes    Cancer Son      Social History:  Social History     Tobacco Use    Smoking status: Never    Smokeless tobacco: Never   Vaping Use    Vaping Use: Never used   Substance Use Topics    Alcohol use: Not Currently    Drug use: Never     Allergies: Allergies   Allergen Reactions    Crestor [Rosuvastatin] Swelling       Review of Systems     Review of Systems  Negative: Positives and pertinent negatives as per HPI. All other systems were reviewed and are negative. Physical Exam & Vital Signs   Vital Signs - Reviewed the patient's vital signs.     Patient Vitals for the past 12 hrs:   Temp Pulse Resp BP SpO2 09/12/22 1010 97.9 °F (36.6 °C) 99 19 130/88 96 %   09/12/22 0915 -- 98 22 126/86 94 %   09/12/22 0654 -- (!) 103 -- (!) 140/82 95 %   09/12/22 0556 -- -- -- -- 99 %   09/12/22 0552 -- -- -- -- 96 %     Physical Exam:    GENERAL: awake, alert, cooperative, not in distress, speaking 5 word sentences  HEENT:  * Pupils equal, EOMI  * Head atraumatic  CV:  * audible heart sounds  * warm and perfused extremities bilaterally  PULMONARY: Decreased air movement, noted bilateral wheezes, no crackles  ABDOMEN/: soft, no distension, no guarding, no abdominal tenderness  EXTREMITIES/BACK: warm and perfused, no tenderness, no edema  JOINT: L Elbow   * No swelling. No erythema  * No abrasions. No lacerations  * No warmth to palpation  * Noted mild pronator insertion tedenress to palpation tenderness to palpation  * No passive ROM limitation  * No active ROM limitation  * Normal radial pulse, hand , and sensation on upper extremity. SKIN: no rashes or signs of trauma  NEURO:  * Speech clear  * Moves U&LE to command    Medical Decision Making   - I am the first and primary provider for this patient and am the primary provider of record. - I reviewed the vital signs, available nursing notes, past medical history, past surgical history, family history and social history. - Initial assessment performed. The patients presenting problems have been discussed, and the staff are in agreement with the care plan formulated and outlined with them. I have encouraged them to ask questions as they arise throughout their visit. - Available medical records, nursing notes, old EKGs, and EMS run sheets (if patient was EMS transported) were reviewed    MDM:   Patient is a 80 y.o. female presenting for elbow pain, resolved. Vitals reveal  tachycardoa  and physical exam reveals  wheezing . EKG showed  sinus tach .  Based on the history, physical exam, risk factors, and vital signs, differential includes: COPD exacerbation, asthma exacerbation, pneumonia, pneumothorax, ACS, CHF. Will give duo nebs and prednisone. Regarding elbow, it could be referred ACS. Differential also includes arthritis and tendinitis. See ED Course and Reassessment for evaluation and discussion. Results     Labs:  Recent Results (from the past 12 hour(s))   COVID-19 RAPID TEST    Collection Time: 09/12/22  5:44 AM   Result Value Ref Range    COVID-19 rapid test Not Detected Not Detected     CBC WITH AUTOMATED DIFF    Collection Time: 09/12/22  5:45 AM   Result Value Ref Range    WBC 11.5 (H) 3.6 - 11.0 K/uL    RBC 3.41 (L) 3.80 - 5.20 M/uL    HGB 9.3 (L) 11.5 - 16.0 g/dL    HCT 29.6 (L) 35.0 - 47.0 %    MCV 86.8 80.0 - 99.0 FL    MCH 27.3 26.0 - 34.0 PG    MCHC 31.4 30.0 - 36.5 g/dL    RDW 14.7 (H) 11.5 - 14.5 %    PLATELET 782 081 - 847 K/uL    MPV 10.9 8.9 - 12.9 FL    NRBC 0.0 0.0  WBC    ABSOLUTE NRBC 0.00 0.00 - 0.01 K/uL    NEUTROPHILS 48 32 - 75 %    LYMPHOCYTES 16 12 - 49 %    MONOCYTES 4 (L) 5 - 13 %    EOSINOPHILS 31 (H) 0 - 7 %    BASOPHILS 0 0 - 1 %    IMMATURE GRANULOCYTES 1 (H) 0 - 0.5 %    ABS. NEUTROPHILS 5.6 1.8 - 8.0 K/UL    ABS. LYMPHOCYTES 1.8 0.8 - 3.5 K/UL    ABS. MONOCYTES 0.5 0.0 - 1.0 K/UL    ABS. EOSINOPHILS 3.5 (H) 0.0 - 0.4 K/UL    ABS. BASOPHILS 0.0 0.0 - 0.1 K/UL    ABS. IMM. GRANS. 0.1 (H) 0.00 - 0.04 K/UL    DF AUTOMATED     TROPONIN-HIGH SENSITIVITY    Collection Time: 09/12/22  6:49 AM   Result Value Ref Range    Troponin-High Sensitivity 8 0 - 51 ng/L     Radiologic Studies:  CT Results  (Last 48 hours)      None          CXR Results  (Last 48 hours)                 09/12/22 0520  XR CHEST PORT Final result    Impression:  No acute process. Stable chronic small right pleural effusion versus   pleural thickening. Narrative:  EXAM:  CR chest portable       INDICATION: Cough       COMPARISON: 6/2/2022.        TECHNIQUE: Portable AP semierect right chest view at 0520 hours       FINDINGS: The cardiomediastinal contours are stable. There is a stable chronic   small right pleural effusion versus pleural thickening. The lungs and left   pleural space are clear. There is no pneumothorax. The bones and upper abdomen   are stable. Medications ordered:  Medications   albuterol-ipratropium (DUO-NEB) 2.5 MG-0.5 MG/3 ML (has no administration in time range)   albuterol-ipratropium (DUO-NEB) 2.5 MG-0.5 MG/3 ML (3 mL Nebulization Given 9/12/22 0548)   predniSONE (DELTASONE) tablet 40 mg (40 mg Oral Given 9/12/22 0530)   magnesium sulfate 2 g/50 ml IVPB (premix or compounded) (2 g IntraVENous New Bag 9/12/22 2433)     ED Course & Reassessment     ED Course:     ED Course as of 09/12/22 1734   Mon Sep 12, 2022   0543 XR elbow normal [SS]   9039 Chest x-ray negative for acute pathology. [SS]   [id number] No significant electrolyte derangements. Creatinine is not elevated more than baseline range making JORDANA unlikely. No significant transaminitis noted. Normal bilirubin. Trop 9, ACS unlikely. Will trend. [SS]   3184 CBC does not show any evidence of acute process. Leukocytosis not present to suggest infection. Hemoglobin not suggestive of acute anemia. [SS]   [id number] Patient has significant improvement of her symptoms. Denies any shortness of breath. No arm pain. No chest pain. Repeat auscultation reveals mild residual wheezing, will give 2 additional duo nebs and magnesium for asthma. Pending repeat trop. [SS]   7514 [id number] testing is negative.   [SS]   5742 Second troponin with negative delta change. ACS ruled out per the high-sensitivity troponin algorithm.   [SS]      ED Course User Index  [SS] Lydia Quiroz MD       Reassessment:    Patient presents with apparent asthma exacerbation. The workup was not significant for any other malignant process. I doubt the patient has any other process requiring admission and further management including pneumonia, pneumothorax, pulmonary edema, or pleural effusion.     The patient had good symptomatic relief in the emergency department. Reexamination demonstrates improvement if wheezing and vitals are reassuring. The patient is a good candidate for outpatient therapy since the patient has a reassuring reexamination without respiratory distress prior to discharge. The patient will be discharged with treatment for asthma exacerbation. Understanding was insured that at this time there is no evidence for a more malignant underlying process, but that early in the process of an illness, an emergency department workup can be falsely reassuring. Routine discharge counseling was given including the fact that any worsening, changing or persistent symptoms should prompt an immediate call or follow up with their primary physician or the emergency department. The importance of appropriate follow up was also discussed. More extensive discharge instructions were given in the patient's discharge paperwork. After completion of evaluation and discussion of results and diagnoses, all the questions were answered. If required, all follow up appointments and treatments were discussed and explained. Understanding was insured prior to discharge. Final Disposition     Discharge: DISCHARGED FROM EMERGENCY DEPARTMENT    Patient will be discharged from the Emergency Department in stable condition. All of the diagnostic tests were reviewed and any questions were answered. Diagnosis, results, follow up if applicable, and return precautions were discussed. I have also put together printed discharge instructions for them that include: 1) educational information regarding their diagnosis, 2) how to care for their diagnosis at home, as well a 3) list of reasons why they would want to return to the ED prior to their follow-up appointment, should their condition change. Any labs or imaging done in the ED will be either printed with the discharge paperwork or available through [address]     DISCHARGE PLAN:  1. Current Discharge Medication List        START taking these medications    Details   albuterol sulfate 90 mcg/actuation aebs Take 1-2 Puffs by inhalation every four (4) hours as needed for Wheezing or Shortness of Breath. Qty: 1 Each, Refills: 2      predniSONE (DELTASONE) 20 mg tablet Take 2 Tablets by mouth daily for 5 days. With Breakfast  Qty: 10 Tablet, Refills: 0           CONTINUE these medications which have NOT CHANGED    Details   albuterol sulfate (PROVENTIL;VENTOLIN) 2.5 mg/0.5 mL nebu nebulizer solution 0.5 mL by Nebulization route every four (4) hours as needed for Wheezing. Qty: 30 Nebule, Refills: 0      epoetin lio-epbx (RETACRIT) 20,000 unit/2 mL injection 1.09 mL by SubCUTAneous route every seven (7) days. Indications: anemia due to kidney failure  Qty: 4 Each, Refills: 0      ferrous sulfate 325 mg (65 mg iron) tablet Take 1 Tablet by mouth daily (with breakfast). Qty: 30 Tablet, Refills: 0      fluticasone-umeclidin-vilanter (TRELEGY ELLIPTA) 200-62.5-25 mcg inhaler Take 1 Puff by inhalation daily. Qty: 3 Blister, Refills: 0      amoxicillin-clavulanate (Augmentin) 500-125 mg per tablet Take 1 Tablet by mouth every twelve (12) hours. Qty: 10 Tablet, Refills: 0      polyethylene glycol (MIRALAX) 17 gram packet Take 1 Packet by mouth daily. Indications: constipation  Qty: 30 Packet, Refills: 0      sucralfate (CARAFATE) 1 gram tablet Take 1 Tablet by mouth Before breakfast, lunch, dinner and at bedtime. Qty: 120 Tablet, Refills: 0      atorvastatin (LIPITOR) 40 mg tablet Take 1 Tablet by mouth every evening. pantoprazole (PROTONIX) 40 mg tablet Take 40 mg by mouth daily. mirtazapine (REMERON) 45 mg tablet Take 1 Tablet by mouth nightly. diclofenac (Voltaren) 1 % gel Apply  to affected area four (4) times daily.   Qty: 100 g, Refills: 0      montelukast (SINGULAIR) 10 mg tablet TAKE 1 TABLET BY MOUTH DAILY  Qty: 30 Tab, Refills: 11      levothyroxine (SYNTHROID) 100 mcg tablet Take 1 tab every day Monday to Friday  Qty: 30 Tab, Refills: 11            2.   Follow-up Information       Follow up With Specialties Details Why Contact Info    Meche Loving MD Internal Medicine Physician Schedule an appointment as soon as possible for a visit in 2 days  [address] [address]  [phone]      [address] [place] [address] DEPT Emergency Medicine Go to  If symptoms worsen [address] [facility]3] [address] [phone]          3. Return to ED if worse    4. Discharge Medication List as of 9/12/2022  9:54 AM        START taking these medications    Details   albuterol sulfate 90 mcg/actuation aebs Take 1-2 Puffs by inhalation every four (4) hours as needed for Wheezing or Shortness of Breath., Normal, Disp-1 Each, R-2      predniSONE (DELTASONE) 20 mg tablet Take 2 Tablets by mouth daily for 5 days. With Breakfast, Normal, Disp-10 Tablet, R-0           CONTINUE these medications which have NOT CHANGED    Details   albuterol sulfate (PROVENTIL;VENTOLIN) 2.5 mg/0.5 mL nebu nebulizer solution 0.5 mL by Nebulization route every four (4) hours as needed for Wheezing., Normal, Disp-30 Nebule, R-0      epoetin lio-epbx (RETACRIT) 20,000 unit/2 mL injection 1.09 mL by SubCUTAneous route every seven (7) days. Indications: anemia due to kidney failure, No Print, Disp-4 Each, R-0      ferrous sulfate 325 mg (65 mg iron) tablet Take 1 Tablet by mouth daily (with breakfast). , Normal, Disp-30 Tablet, R-0      fluticasone-umeclidin-vilanter (TRELEGY ELLIPTA) 200-62.5-25 mcg inhaler Take 1 Puff by inhalation daily. , Normal, Disp-3 Blister, R-0      amoxicillin-clavulanate (Augmentin) 500-125 mg per tablet Take 1 Tablet by mouth every twelve (12) hours. , Normal, Disp-10 Tablet, R-0      polyethylene glycol (MIRALAX) 17 gram packet Take 1 Packet by mouth daily.  Indications: constipation, Normal, Disp-30 Packet, R-0      sucralfate (CARAFATE) 1 gram tablet Take 1 Tablet by mouth Before breakfast, lunch, dinner and at bedtime., Normal, Disp-120 Tablet, R-0      atorvastatin (LIPITOR) 40 mg tablet Take 1 Tablet by mouth every evening., Historical Med      pantoprazole (PROTONIX) 40 mg tablet Take 40 mg by mouth daily. , Historical Med      mirtazapine (REMERON) 45 mg tablet Take 1 Tablet by mouth nightly., Historical Med      diclofenac (Voltaren) 1 % gel Apply  to affected area four (4) times daily. , Normal, Disp-100 g, R-0      montelukast (SINGULAIR) 10 mg tablet TAKE 1 TABLET BY MOUTH DAILY, Normal, Disp-30 Tab, R-11      levothyroxine (SYNTHROID) 100 mcg tablet Take 1 tab every day Monday to Friday, Normal, Disp-30 Tab, R-11             ED Procedures & EKGs   Performed by: Rea Holder MD  Procedures     EKG interpretation (Preliminary):  Rhythm: normal sinus rhythm; and tachycardic . Rate (approx.): 112. Axis: normal;  DC interval: normal;  QRS interval: normal ;  ST/T wave: normal;  Other findings: abnormal EKG: Sinus tachycardia. Clinical Tools & Critical Care     Heart Score: Not Applicable. CRITICAL CARE DOCUMENTATION: Critical care criteria and/or time were not met. Diagnosis     Clinical Impression:   1. Mild intermittent asthma with acute exacerbation    2. Contusion of soft tissue      Attestations:    Rea Holder MD    Please note that this dictation was completed with [employer], the [employer] voice recognition software. Quite often unanticipated grammatical, syntax, homophones, and other interpretive errors are inadvertently transcribed by the computer software. Please disregard these errors. Please excuse any errors that have escaped final proofreading. Thank you.

## 2023-05-21 RX ORDER — PANTOPRAZOLE SODIUM 40 MG/1
40 TABLET, DELAYED RELEASE ORAL DAILY
COMMUNITY
Start: 2022-01-09

## 2023-05-21 RX ORDER — MONTELUKAST SODIUM 10 MG/1
1 TABLET ORAL DAILY
Status: ON HOLD | COMMUNITY
Start: 2018-04-27 | End: 2023-07-08

## 2023-05-21 RX ORDER — MIRTAZAPINE 45 MG/1
1 TABLET, FILM COATED ORAL NIGHTLY
COMMUNITY
Start: 2022-02-04

## 2023-05-21 RX ORDER — SUCRALFATE 1 G/1
1 TABLET ORAL
Status: ON HOLD | COMMUNITY
Start: 2022-03-15 | End: 2023-07-08

## 2023-05-21 RX ORDER — ATORVASTATIN CALCIUM 40 MG/1
1 TABLET, FILM COATED ORAL EVERY EVENING
Status: ON HOLD | COMMUNITY
Start: 2022-02-04 | End: 2023-07-14 | Stop reason: HOSPADM

## 2023-05-21 RX ORDER — FERROUS SULFATE 325(65) MG
325 TABLET ORAL
COMMUNITY
Start: 2022-03-16

## 2023-05-21 RX ORDER — AMOXICILLIN AND CLAVULANATE POTASSIUM 500; 125 MG/1; MG/1
1 TABLET, FILM COATED ORAL EVERY 12 HOURS
Status: ON HOLD | COMMUNITY
Start: 2022-03-15 | End: 2023-07-14 | Stop reason: HOSPADM

## 2023-05-21 RX ORDER — POLYETHYLENE GLYCOL 3350 17 G/17G
17 POWDER, FOR SOLUTION ORAL DAILY
COMMUNITY
Start: 2022-03-16

## 2023-05-21 RX ORDER — LEVOTHYROXINE SODIUM 0.1 MG/1
TABLET ORAL
COMMUNITY
Start: 2017-05-17

## 2023-05-21 RX ORDER — FLUTICASONE FUROATE, UMECLIDINIUM BROMIDE AND VILANTEROL TRIFENATATE 200; 62.5; 25 UG/1; UG/1; UG/1
1 POWDER RESPIRATORY (INHALATION) DAILY
COMMUNITY
Start: 2022-03-16

## 2023-07-07 ENCOUNTER — APPOINTMENT (OUTPATIENT)
Facility: HOSPITAL | Age: 88
End: 2023-07-07
Payer: MEDICARE

## 2023-07-07 ENCOUNTER — HOSPITAL ENCOUNTER (EMERGENCY)
Facility: HOSPITAL | Age: 88
Discharge: ANOTHER HOSPITAL | End: 2023-07-07
Attending: EMERGENCY MEDICINE
Payer: MEDICARE

## 2023-07-07 ENCOUNTER — HOSPITAL ENCOUNTER (INPATIENT)
Facility: HOSPITAL | Age: 88
LOS: 7 days | Discharge: NURSING, REHAB OR LONG-TERM CARE | End: 2023-07-14
Attending: STUDENT IN AN ORGANIZED HEALTH CARE EDUCATION/TRAINING PROGRAM | Admitting: FAMILY MEDICINE
Payer: MEDICARE

## 2023-07-07 VITALS
SYSTOLIC BLOOD PRESSURE: 147 MMHG | TEMPERATURE: 98.7 F | WEIGHT: 115 LBS | OXYGEN SATURATION: 98 % | RESPIRATION RATE: 13 BRPM | HEIGHT: 65 IN | BODY MASS INDEX: 19.16 KG/M2 | DIASTOLIC BLOOD PRESSURE: 74 MMHG | HEART RATE: 86 BPM

## 2023-07-07 DIAGNOSIS — I63.9 CEREBROVASCULAR ACCIDENT (CVA), UNSPECIFIED MECHANISM (HCC): Primary | ICD-10-CM

## 2023-07-07 DIAGNOSIS — I63.9 ACUTE STROKE DUE TO ISCHEMIA (HCC): ICD-10-CM

## 2023-07-07 DIAGNOSIS — I10 HYPERTENSION, UNSPECIFIED TYPE: Primary | ICD-10-CM

## 2023-07-07 DIAGNOSIS — M79.661 RIGHT CALF PAIN: ICD-10-CM

## 2023-07-07 DIAGNOSIS — R29.898 RIGHT ARM WEAKNESS: ICD-10-CM

## 2023-07-07 LAB
ALBUMIN SERPL-MCNC: 3.3 G/DL (ref 3.5–5)
ALBUMIN/GLOB SERPL: 0.8 (ref 1.1–2.2)
ALP SERPL-CCNC: 90 U/L (ref 45–117)
ALT SERPL-CCNC: 9 U/L (ref 12–78)
ANION GAP SERPL CALC-SCNC: 4 MMOL/L (ref 5–15)
AST SERPL W P-5'-P-CCNC: 11 U/L (ref 15–37)
BASOPHILS # BLD: 0 K/UL (ref 0–0.1)
BASOPHILS NFR BLD: 0 % (ref 0–1)
BILIRUB SERPL-MCNC: 0.5 MG/DL (ref 0.2–1)
BUN SERPL-MCNC: 33 MG/DL (ref 6–20)
BUN/CREAT SERPL: 21 (ref 12–20)
CA-I BLD-MCNC: 9 MG/DL (ref 8.5–10.1)
CHLORIDE SERPL-SCNC: 110 MMOL/L (ref 97–108)
CHP ED QC CHECK: YES
CK SERPL-CCNC: 23 U/L (ref 26–192)
CO2 SERPL-SCNC: 31 MMOL/L (ref 21–32)
CREAT SERPL-MCNC: 1.57 MG/DL (ref 0.55–1.02)
DIFFERENTIAL METHOD BLD: ABNORMAL
EOSINOPHIL # BLD: 0.8 K/UL (ref 0–0.4)
EOSINOPHIL NFR BLD: 12 % (ref 0–7)
ERYTHROCYTE [DISTWIDTH] IN BLOOD BY AUTOMATED COUNT: 16.9 % (ref 11.5–14.5)
GLOBULIN SER CALC-MCNC: 4.1 G/DL (ref 2–4)
GLUCOSE BLD STRIP.AUTO-MCNC: 120 MG/DL (ref 65–100)
GLUCOSE BLD-MCNC: 120 MG/DL
GLUCOSE SERPL-MCNC: 125 MG/DL (ref 65–100)
HCT VFR BLD AUTO: 31.4 % (ref 35–47)
HGB BLD-MCNC: 9.7 G/DL (ref 11.5–16)
IMM GRANULOCYTES # BLD AUTO: 0 K/UL (ref 0–0.04)
IMM GRANULOCYTES NFR BLD AUTO: 0 % (ref 0–0.5)
INR PPP: 1.1 (ref 0.9–1.1)
LYMPHOCYTES # BLD: 1.7 K/UL (ref 0.8–3.5)
LYMPHOCYTES NFR BLD: 25 % (ref 12–49)
MCH RBC QN AUTO: 25.1 PG (ref 26–34)
MCHC RBC AUTO-ENTMCNC: 30.9 G/DL (ref 30–36.5)
MCV RBC AUTO: 81.1 FL (ref 80–99)
MONOCYTES # BLD: 0.5 K/UL (ref 0–1)
MONOCYTES NFR BLD: 8 % (ref 5–13)
NEUTS SEG # BLD: 3.8 K/UL (ref 1.8–8)
NEUTS SEG NFR BLD: 55 % (ref 32–75)
NRBC # BLD: 0 K/UL (ref 0–0.01)
NRBC BLD-RTO: 0 PER 100 WBC
PERFORMED BY:: ABNORMAL
PLATELET # BLD AUTO: 356 K/UL (ref 150–400)
POTASSIUM SERPL-SCNC: 4 MMOL/L (ref 3.5–5.1)
PROT SERPL-MCNC: 7.4 G/DL (ref 6.4–8.2)
PROTHROMBIN TIME: 14.4 SEC (ref 11.9–14.6)
RBC # BLD AUTO: 3.87 M/UL (ref 3.8–5.2)
SODIUM SERPL-SCNC: 145 MMOL/L (ref 136–145)
WBC # BLD AUTO: 6.8 K/UL (ref 3.6–11)

## 2023-07-07 PROCEDURE — 82550 ASSAY OF CK (CPK): CPT

## 2023-07-07 PROCEDURE — 82962 GLUCOSE BLOOD TEST: CPT

## 2023-07-07 PROCEDURE — 80053 COMPREHEN METABOLIC PANEL: CPT

## 2023-07-07 PROCEDURE — 2060000000 HC ICU INTERMEDIATE R&B

## 2023-07-07 PROCEDURE — 99285 EMERGENCY DEPT VISIT HI MDM: CPT

## 2023-07-07 PROCEDURE — 36415 COLL VENOUS BLD VENIPUNCTURE: CPT

## 2023-07-07 PROCEDURE — 72125 CT NECK SPINE W/O DYE: CPT

## 2023-07-07 PROCEDURE — 85610 PROTHROMBIN TIME: CPT

## 2023-07-07 PROCEDURE — 85025 COMPLETE CBC W/AUTO DIFF WBC: CPT

## 2023-07-07 PROCEDURE — 70450 CT HEAD/BRAIN W/O DYE: CPT

## 2023-07-07 PROCEDURE — 2580000003 HC RX 258: Performed by: EMERGENCY MEDICINE

## 2023-07-07 RX ORDER — 0.9 % SODIUM CHLORIDE 0.9 %
1000 INTRAVENOUS SOLUTION INTRAVENOUS
Status: COMPLETED | OUTPATIENT
Start: 2023-07-07 | End: 2023-07-07

## 2023-07-07 RX ADMIN — SODIUM CHLORIDE 1000 ML: 9 INJECTION, SOLUTION INTRAVENOUS at 19:07

## 2023-07-07 ASSESSMENT — LIFESTYLE VARIABLES
HOW MANY STANDARD DRINKS CONTAINING ALCOHOL DO YOU HAVE ON A TYPICAL DAY: PATIENT DOES NOT DRINK
HOW OFTEN DO YOU HAVE A DRINK CONTAINING ALCOHOL: NEVER

## 2023-07-07 NOTE — ED TRIAGE NOTES
Pt arrives with a primary complaint of losing feeling in right arm approx 2 days ago and a fall that occurred yesterday. Pt unable to move right arm at all, cannot hold arm up, unable to move fingers. Daughter states that patient was found on the bathroom floor around 2pm yesterday, unsure of when she fell. Pt denies LOC or hitting head. Not taking blood thinners.  Hx dementia, HTN, TIA

## 2023-07-07 NOTE — ED PROVIDER NOTES
[facility] EMERGENCY DEPT  EMERGENCY DEPARTMENT HISTORY AND PHYSICAL EXAM      Date: 2023  Patient Name: Donato Tello  MRN: [id number]  [address] 1933  Date of evaluation: 2023  Provider: Rakel Mauricio MD   Note Started: 2:05 PM EDT 23    HISTORY OF PRESENT ILLNESS     Chief Complaint   Patient presents with    Fall    Numbness       History Provided By: Patient and patient's daughter    HPI: Donato Tello is a 80 y.o. female presents to the emergency department complaint of right arm weakness x2 days. Patient's daughter present states the last known well time was approximately 5 days ago, history somewhat limited due to patient's dementia. Patient states she has had this weakness in her right arm for 2 days, but also to fall yesterday. Patient's daughter states she found the patient on the floor at 2:00 yesterday but had no idea when she fell. Patient denies hitting her head or LOC and is not on blood thinners. Patient's daughter does report prior history of TIA. PAST MEDICAL HISTORY   Past Medical History:  Past Medical History:   Diagnosis Date    Asthma     Bereavement 3/30/16    65 yo con  - multiple myleoma     Cholelithiasis     Cough     Dyslipidemia     Hip pain, chronic, left 2017    Hypertension     Low back pain 16    Osteoarthritis     Osteoporosis 16    Prediabetes     S/P colonoscopy 7/15    vcu    Trigger finger, right 2017    Wedge compression fx of second thoracic vertebra with routine healing 16    t11       Past Surgical History:  Past Surgical History:   Procedure Laterality Date    COLONOSCOPY      GYN         Family History:  Family History   Problem Relation Age of Onset    Cancer Son        Social History:  Social History     Tobacco Use    Smoking status: Never    Smokeless tobacco: Never   Substance Use Topics    Alcohol use: Not Currently    Drug use: Never       Allergies:   Allergies   Allergen Reactions    Rosuvastatin

## 2023-07-08 ENCOUNTER — APPOINTMENT (OUTPATIENT)
Facility: HOSPITAL | Age: 88
End: 2023-07-08
Attending: STUDENT IN AN ORGANIZED HEALTH CARE EDUCATION/TRAINING PROGRAM
Payer: MEDICARE

## 2023-07-08 PROBLEM — R29.898 WEAKNESS OF RIGHT UPPER EXTREMITY: Status: ACTIVE | Noted: 2023-07-08

## 2023-07-08 LAB
CHOLEST SERPL-MCNC: 148 MG/DL
EST. AVERAGE GLUCOSE BLD GHB EST-MCNC: 77 MG/DL
HBA1C MFR BLD: 4.3 % (ref 4–5.6)
HDLC SERPL-MCNC: 51 MG/DL
HDLC SERPL: 2.9 (ref 0–5)
LDLC SERPL CALC-MCNC: 84.8 MG/DL (ref 0–100)
TRIGL SERPL-MCNC: 61 MG/DL
VLDLC SERPL CALC-MCNC: 12.2 MG/DL

## 2023-07-08 PROCEDURE — 2580000003 HC RX 258: Performed by: FAMILY MEDICINE

## 2023-07-08 PROCEDURE — 6370000000 HC RX 637 (ALT 250 FOR IP): Performed by: FAMILY MEDICINE

## 2023-07-08 PROCEDURE — 6360000002 HC RX W HCPCS: Performed by: FAMILY MEDICINE

## 2023-07-08 PROCEDURE — 97116 GAIT TRAINING THERAPY: CPT

## 2023-07-08 PROCEDURE — 97161 PT EVAL LOW COMPLEX 20 MIN: CPT

## 2023-07-08 PROCEDURE — 70551 MRI BRAIN STEM W/O DYE: CPT

## 2023-07-08 PROCEDURE — 6370000000 HC RX 637 (ALT 250 FOR IP): Performed by: NURSE PRACTITIONER

## 2023-07-08 PROCEDURE — 36415 COLL VENOUS BLD VENIPUNCTURE: CPT

## 2023-07-08 PROCEDURE — 2060000000 HC ICU INTERMEDIATE R&B

## 2023-07-08 PROCEDURE — 83036 HEMOGLOBIN GLYCOSYLATED A1C: CPT

## 2023-07-08 PROCEDURE — 94640 AIRWAY INHALATION TREATMENT: CPT

## 2023-07-08 PROCEDURE — 80061 LIPID PANEL: CPT

## 2023-07-08 RX ORDER — ONDANSETRON 4 MG/1
4 TABLET, ORALLY DISINTEGRATING ORAL EVERY 8 HOURS PRN
Status: DISCONTINUED | OUTPATIENT
Start: 2023-07-08 | End: 2023-07-14 | Stop reason: HOSPADM

## 2023-07-08 RX ORDER — POLYETHYLENE GLYCOL 3350 17 G/17G
17 POWDER, FOR SOLUTION ORAL DAILY PRN
Status: DISCONTINUED | OUTPATIENT
Start: 2023-07-08 | End: 2023-07-14 | Stop reason: HOSPADM

## 2023-07-08 RX ORDER — FERROUS SULFATE 325(65) MG
325 TABLET ORAL
Status: DISCONTINUED | OUTPATIENT
Start: 2023-07-08 | End: 2023-07-14 | Stop reason: HOSPADM

## 2023-07-08 RX ORDER — ONDANSETRON 2 MG/ML
4 INJECTION INTRAMUSCULAR; INTRAVENOUS EVERY 6 HOURS PRN
Status: DISCONTINUED | OUTPATIENT
Start: 2023-07-08 | End: 2023-07-14 | Stop reason: HOSPADM

## 2023-07-08 RX ORDER — MONTELUKAST SODIUM 10 MG/1
10 TABLET ORAL DAILY
Status: DISCONTINUED | OUTPATIENT
Start: 2023-07-08 | End: 2023-07-08

## 2023-07-08 RX ORDER — ATORVASTATIN CALCIUM 40 MG/1
40 TABLET, FILM COATED ORAL EVERY EVENING
Status: DISCONTINUED | OUTPATIENT
Start: 2023-07-08 | End: 2023-07-09

## 2023-07-08 RX ORDER — SODIUM CHLORIDE 9 MG/ML
INJECTION, SOLUTION INTRAVENOUS CONTINUOUS
Status: DISPENSED | OUTPATIENT
Start: 2023-07-08 | End: 2023-07-08

## 2023-07-08 RX ORDER — ALBUTEROL SULFATE 2.5 MG/3ML
2.5 SOLUTION RESPIRATORY (INHALATION) EVERY 4 HOURS PRN
Status: DISCONTINUED | OUTPATIENT
Start: 2023-07-08 | End: 2023-07-14 | Stop reason: HOSPADM

## 2023-07-08 RX ORDER — POLYETHYLENE GLYCOL 3350 17 G/17G
17 POWDER, FOR SOLUTION ORAL DAILY
Status: DISCONTINUED | OUTPATIENT
Start: 2023-07-08 | End: 2023-07-14 | Stop reason: HOSPADM

## 2023-07-08 RX ORDER — SODIUM CHLORIDE 9 MG/ML
INJECTION, SOLUTION INTRAVENOUS CONTINUOUS
Status: DISCONTINUED | OUTPATIENT
Start: 2023-07-08 | End: 2023-07-13 | Stop reason: HOSPADM

## 2023-07-08 RX ORDER — ASPIRIN 81 MG/1
81 TABLET ORAL DAILY
Status: DISCONTINUED | OUTPATIENT
Start: 2023-07-08 | End: 2023-07-14 | Stop reason: HOSPADM

## 2023-07-08 RX ORDER — LEVOTHYROXINE SODIUM 0.1 MG/1
100 TABLET ORAL DAILY
Status: DISCONTINUED | OUTPATIENT
Start: 2023-07-08 | End: 2023-07-14 | Stop reason: HOSPADM

## 2023-07-08 RX ORDER — SUCRALFATE 1 G/1
1 TABLET ORAL
Status: DISCONTINUED | OUTPATIENT
Start: 2023-07-08 | End: 2023-07-08

## 2023-07-08 RX ORDER — ASPIRIN 300 MG/1
300 SUPPOSITORY RECTAL DAILY
Status: DISCONTINUED | OUTPATIENT
Start: 2023-07-08 | End: 2023-07-14 | Stop reason: HOSPADM

## 2023-07-08 RX ORDER — ACETAMINOPHEN 325 MG/1
650 TABLET ORAL EVERY 6 HOURS PRN
Status: DISCONTINUED | OUTPATIENT
Start: 2023-07-08 | End: 2023-07-14 | Stop reason: HOSPADM

## 2023-07-08 RX ORDER — PANTOPRAZOLE SODIUM 40 MG/1
40 TABLET, DELAYED RELEASE ORAL DAILY
Status: DISCONTINUED | OUTPATIENT
Start: 2023-07-08 | End: 2023-07-14 | Stop reason: HOSPADM

## 2023-07-08 RX ADMIN — ACETAMINOPHEN 650 MG: 325 TABLET ORAL at 18:24

## 2023-07-08 RX ADMIN — FERROUS SULFATE TAB 325 MG (65 MG ELEMENTAL FE) 325 MG: 325 (65 FE) TAB at 09:42

## 2023-07-08 RX ADMIN — IPRATROPIUM BROMIDE 0.5 MG: 0.5 SOLUTION RESPIRATORY (INHALATION) at 14:17

## 2023-07-08 RX ADMIN — ATORVASTATIN CALCIUM 40 MG: 40 TABLET, FILM COATED ORAL at 18:09

## 2023-07-08 RX ADMIN — ASPIRIN 81 MG: 81 TABLET, COATED ORAL at 09:41

## 2023-07-08 RX ADMIN — IPRATROPIUM BROMIDE 0.5 MG: 0.5 SOLUTION RESPIRATORY (INHALATION) at 08:00

## 2023-07-08 RX ADMIN — POLYETHYLENE GLYCOL 3350 17 G: 17 POWDER, FOR SOLUTION ORAL at 09:42

## 2023-07-08 RX ADMIN — IPRATROPIUM BROMIDE 0.5 MG: 0.5 SOLUTION RESPIRATORY (INHALATION) at 20:35

## 2023-07-08 RX ADMIN — ATORVASTATIN CALCIUM 40 MG: 40 TABLET, FILM COATED ORAL at 06:50

## 2023-07-08 RX ADMIN — SODIUM CHLORIDE: 9 INJECTION, SOLUTION INTRAVENOUS at 06:49

## 2023-07-08 RX ADMIN — SODIUM CHLORIDE: 9 INJECTION, SOLUTION INTRAVENOUS at 14:28

## 2023-07-08 RX ADMIN — ARFORMOTEROL TARTRATE: 15 SOLUTION RESPIRATORY (INHALATION) at 08:00

## 2023-07-08 RX ADMIN — LEVOTHYROXINE SODIUM 100 MCG: 0.1 TABLET ORAL at 06:50

## 2023-07-08 RX ADMIN — PANTOPRAZOLE SODIUM 40 MG: 40 TABLET, DELAYED RELEASE ORAL at 09:41

## 2023-07-08 RX ADMIN — SODIUM CHLORIDE: 9 INJECTION, SOLUTION INTRAVENOUS at 18:09

## 2023-07-08 RX ADMIN — ARFORMOTEROL TARTRATE: 15 SOLUTION RESPIRATORY (INHALATION) at 20:35

## 2023-07-08 NOTE — ED NOTES
Report called to [facility]. Report given to Chad Sotelo at this time.       Deonna Puckett RN  07/07/23 1610

## 2023-07-08 NOTE — ED NOTES
Contacted daughter, Evy Morris, at this time. No answer; VM left.       Catherine Shi RN  07/07/23 5988

## 2023-07-08 NOTE — ED NOTES
Patient accepted at [address] [place] [address]   Number for report Matt Brice RN  07/07/23 5862

## 2023-07-09 ENCOUNTER — APPOINTMENT (OUTPATIENT)
Facility: HOSPITAL | Age: 88
End: 2023-07-09
Attending: STUDENT IN AN ORGANIZED HEALTH CARE EDUCATION/TRAINING PROGRAM
Payer: MEDICARE

## 2023-07-09 ENCOUNTER — APPOINTMENT (OUTPATIENT)
Facility: HOSPITAL | Age: 88
End: 2023-07-09
Attending: FAMILY MEDICINE
Payer: MEDICARE

## 2023-07-09 LAB
ANION GAP SERPL CALC-SCNC: 5 MMOL/L (ref 5–15)
BASOPHILS # BLD: 0 K/UL (ref 0–0.1)
BASOPHILS NFR BLD: 0 % (ref 0–1)
BUN SERPL-MCNC: 17 MG/DL (ref 6–20)
BUN/CREAT SERPL: 17 (ref 12–20)
CALCIUM SERPL-MCNC: 8 MG/DL (ref 8.5–10.1)
CHLORIDE SERPL-SCNC: 109 MMOL/L (ref 97–108)
CO2 SERPL-SCNC: 24 MMOL/L (ref 21–32)
CREAT SERPL-MCNC: 0.99 MG/DL (ref 0.55–1.02)
DIFFERENTIAL METHOD BLD: ABNORMAL
ECHO AO ROOT DIAM: 3.4 CM
ECHO AO ROOT INDEX: 2.14 CM/M2
ECHO AV AREA PEAK VELOCITY: 1.8 CM2
ECHO AV AREA PEAK VELOCITY: 1.8 CM2
ECHO AV AREA PEAK VELOCITY: 2 CM2
ECHO AV AREA PEAK VELOCITY: 2 CM2
ECHO AV AREA VTI: 1.7 CM2
ECHO AV AREA/BSA VTI: 1.1 CM2/M2
ECHO AV MEAN GRADIENT: 9 MMHG
ECHO AV MEAN VELOCITY: 1.4 M/S
ECHO AV PEAK GRADIENT: 16 MMHG
ECHO AV PEAK GRADIENT: 16 MMHG
ECHO AV PEAK VELOCITY: 2 M/S
ECHO AV PEAK VELOCITY: 2 M/S
ECHO AV VTI: 38.9 CM
ECHO BSA: 1.57 M2
ECHO BSA: 1.6 M2
ECHO LA DIAMETER INDEX: 1.07 CM/M2
ECHO LA DIAMETER: 1.7 CM
ECHO LA TO AORTIC ROOT RATIO: 0.5
ECHO LV FRACTIONAL SHORTENING: 31 % (ref 28–44)
ECHO LV INTERNAL DIMENSION DIASTOLE INDEX: 2.45 CM/M2
ECHO LV INTERNAL DIMENSION DIASTOLIC: 3.9 CM (ref 3.9–5.3)
ECHO LV INTERNAL DIMENSION SYSTOLIC INDEX: 1.7 CM/M2
ECHO LV INTERNAL DIMENSION SYSTOLIC: 2.7 CM
ECHO LV IVSD: 0.5 CM (ref 0.6–0.9)
ECHO LV MASS 2D: 55.2 G (ref 67–162)
ECHO LV MASS INDEX 2D: 34.7 G/M2 (ref 43–95)
ECHO LV POSTERIOR WALL DIASTOLIC: 0.6 CM (ref 0.6–0.9)
ECHO LV RELATIVE WALL THICKNESS RATIO: 0.31
ECHO LVOT AREA: 2.8 CM2
ECHO LVOT AV VTI INDEX: 0.58
ECHO LVOT DIAM: 1.9 CM
ECHO LVOT MEAN GRADIENT: 3 MMHG
ECHO LVOT PEAK GRADIENT: 6 MMHG
ECHO LVOT PEAK GRADIENT: 7 MMHG
ECHO LVOT PEAK VELOCITY: 1.3 M/S
ECHO LVOT PEAK VELOCITY: 1.4 M/S
ECHO LVOT STROKE VOLUME INDEX: 40.3 ML/M2
ECHO LVOT SV: 64 ML
ECHO LVOT VTI: 22.6 CM
EOSINOPHIL # BLD: 0.9 K/UL (ref 0–0.4)
EOSINOPHIL NFR BLD: 12 % (ref 0–7)
ERYTHROCYTE [DISTWIDTH] IN BLOOD BY AUTOMATED COUNT: 17.1 % (ref 11.5–14.5)
GLUCOSE SERPL-MCNC: 122 MG/DL (ref 65–100)
HCT VFR BLD AUTO: 25.5 % (ref 35–47)
HGB BLD-MCNC: 7.9 G/DL (ref 11.5–16)
IMM GRANULOCYTES # BLD AUTO: 0 K/UL (ref 0–0.04)
IMM GRANULOCYTES NFR BLD AUTO: 0 % (ref 0–0.5)
LYMPHOCYTES # BLD: 1.8 K/UL (ref 0.8–3.5)
LYMPHOCYTES NFR BLD: 24 % (ref 12–49)
MCH RBC QN AUTO: 25.1 PG (ref 26–34)
MCHC RBC AUTO-ENTMCNC: 31 G/DL (ref 30–36.5)
MCV RBC AUTO: 81 FL (ref 80–99)
MONOCYTES # BLD: 0.6 K/UL (ref 0–1)
MONOCYTES NFR BLD: 8 % (ref 5–13)
NEUTS SEG # BLD: 4.2 K/UL (ref 1.8–8)
NEUTS SEG NFR BLD: 56 % (ref 32–75)
NRBC # BLD: 0 K/UL (ref 0–0.01)
NRBC BLD-RTO: 0 PER 100 WBC
PLATELET # BLD AUTO: 168 K/UL (ref 150–400)
POTASSIUM SERPL-SCNC: 3.7 MMOL/L (ref 3.5–5.1)
RBC # BLD AUTO: 3.15 M/UL (ref 3.8–5.2)
SODIUM SERPL-SCNC: 138 MMOL/L (ref 136–145)
VAS LEFT CCA DIST EDV: 17.1 CM/S
VAS LEFT CCA DIST PSV: 65.5 CM/S
VAS LEFT CCA PROX EDV: 12.8 CM/S
VAS LEFT CCA PROX PSV: 60.8 CM/S
VAS LEFT ECA EDV: 8 CM/S
VAS LEFT ECA PSV: 88.2 CM/S
VAS LEFT ICA DIST EDV: 29 CM/S
VAS LEFT ICA DIST PSV: 83 CM/S
VAS LEFT ICA MID EDV: 16.2 CM/S
VAS LEFT ICA MID PSV: 53.4 CM/S
VAS LEFT ICA PROX EDV: 12.3 CM/S
VAS LEFT ICA PROX PSV: 79.6 CM/S
VAS LEFT ICA/CCA PSV: 1.3 NO UNITS
VAS LEFT VERTEBRAL EDV: 13 CM/S
VAS LEFT VERTEBRAL PSV: 56 CM/S
VAS RIGHT CCA DIST EDV: 17.5 CM/S
VAS RIGHT CCA DIST PSV: 51.5 CM/S
VAS RIGHT CCA PROX EDV: 9 CM/S
VAS RIGHT CCA PROX PSV: 53.3 CM/S
VAS RIGHT ECA EDV: 5.4 CM/S
VAS RIGHT ECA PSV: 59.2 CM/S
VAS RIGHT ICA DIST EDV: 29.6 CM/S
VAS RIGHT ICA DIST PSV: 80.1 CM/S
VAS RIGHT ICA MID EDV: 18.3 CM/S
VAS RIGHT ICA MID PSV: 53.7 CM/S
VAS RIGHT ICA PROX EDV: 12 CM/S
VAS RIGHT ICA PROX PSV: 66.9 CM/S
VAS RIGHT ICA/CCA PSV: 1.6 NO UNITS
VAS RIGHT VERTEBRAL EDV: 12 CM/S
VAS RIGHT VERTEBRAL PSV: 51.5 CM/S
WBC # BLD AUTO: 7.5 K/UL (ref 3.6–11)

## 2023-07-09 PROCEDURE — 36415 COLL VENOUS BLD VENIPUNCTURE: CPT

## 2023-07-09 PROCEDURE — 71045 X-RAY EXAM CHEST 1 VIEW: CPT

## 2023-07-09 PROCEDURE — 92610 EVALUATE SWALLOWING FUNCTION: CPT

## 2023-07-09 PROCEDURE — 94640 AIRWAY INHALATION TREATMENT: CPT

## 2023-07-09 PROCEDURE — 70547 MR ANGIOGRAPHY NECK W/O DYE: CPT

## 2023-07-09 PROCEDURE — 93308 TTE F-UP OR LMTD: CPT

## 2023-07-09 PROCEDURE — 6370000000 HC RX 637 (ALT 250 FOR IP): Performed by: PSYCHIATRY & NEUROLOGY

## 2023-07-09 PROCEDURE — 6360000002 HC RX W HCPCS: Performed by: FAMILY MEDICINE

## 2023-07-09 PROCEDURE — 6370000000 HC RX 637 (ALT 250 FOR IP): Performed by: FAMILY MEDICINE

## 2023-07-09 PROCEDURE — 97535 SELF CARE MNGMENT TRAINING: CPT

## 2023-07-09 PROCEDURE — 93880 EXTRACRANIAL BILAT STUDY: CPT

## 2023-07-09 PROCEDURE — 99222 1ST HOSP IP/OBS MODERATE 55: CPT | Performed by: PSYCHIATRY & NEUROLOGY

## 2023-07-09 PROCEDURE — 80048 BASIC METABOLIC PNL TOTAL CA: CPT

## 2023-07-09 PROCEDURE — 85025 COMPLETE CBC W/AUTO DIFF WBC: CPT

## 2023-07-09 PROCEDURE — 97166 OT EVAL MOD COMPLEX 45 MIN: CPT

## 2023-07-09 PROCEDURE — 70544 MR ANGIOGRAPHY HEAD W/O DYE: CPT

## 2023-07-09 PROCEDURE — 2060000000 HC ICU INTERMEDIATE R&B

## 2023-07-09 RX ORDER — ATORVASTATIN CALCIUM 40 MG/1
80 TABLET, FILM COATED ORAL EVERY EVENING
Status: DISCONTINUED | OUTPATIENT
Start: 2023-07-09 | End: 2023-07-14 | Stop reason: HOSPADM

## 2023-07-09 RX ADMIN — IPRATROPIUM BROMIDE 0.5 MG: 0.5 SOLUTION RESPIRATORY (INHALATION) at 14:37

## 2023-07-09 RX ADMIN — POLYETHYLENE GLYCOL 3350 17 G: 17 POWDER, FOR SOLUTION ORAL at 08:43

## 2023-07-09 RX ADMIN — ARFORMOTEROL TARTRATE: 15 SOLUTION RESPIRATORY (INHALATION) at 08:05

## 2023-07-09 RX ADMIN — FERROUS SULFATE TAB 325 MG (65 MG ELEMENTAL FE) 325 MG: 325 (65 FE) TAB at 08:44

## 2023-07-09 RX ADMIN — LEVOTHYROXINE SODIUM 100 MCG: 0.1 TABLET ORAL at 06:48

## 2023-07-09 RX ADMIN — ATORVASTATIN CALCIUM 80 MG: 40 TABLET, FILM COATED ORAL at 18:27

## 2023-07-09 RX ADMIN — PANTOPRAZOLE SODIUM 40 MG: 40 TABLET, DELAYED RELEASE ORAL at 08:44

## 2023-07-09 RX ADMIN — IPRATROPIUM BROMIDE 0.5 MG: 0.5 SOLUTION RESPIRATORY (INHALATION) at 08:05

## 2023-07-09 RX ADMIN — ARFORMOTEROL TARTRATE: 15 SOLUTION RESPIRATORY (INHALATION) at 20:37

## 2023-07-09 RX ADMIN — ASPIRIN 81 MG: 81 TABLET, COATED ORAL at 08:44

## 2023-07-09 RX ADMIN — IPRATROPIUM BROMIDE 0.5 MG: 0.5 SOLUTION RESPIRATORY (INHALATION) at 20:37

## 2023-07-10 ENCOUNTER — APPOINTMENT (OUTPATIENT)
Facility: HOSPITAL | Age: 88
End: 2023-07-10
Attending: STUDENT IN AN ORGANIZED HEALTH CARE EDUCATION/TRAINING PROGRAM
Payer: MEDICARE

## 2023-07-10 LAB
COMMENT:: NORMAL
EKG ATRIAL RATE: 103 BPM
EKG DIAGNOSIS: NORMAL
EKG P AXIS: 50 DEGREES
EKG P-R INTERVAL: 160 MS
EKG Q-T INTERVAL: 348 MS
EKG QRS DURATION: 78 MS
EKG QTC CALCULATION (BAZETT): 455 MS
EKG R AXIS: 4 DEGREES
EKG T AXIS: 39 DEGREES
EKG VENTRICULAR RATE: 103 BPM
SPECIMEN HOLD: NORMAL

## 2023-07-10 PROCEDURE — 93971 EXTREMITY STUDY: CPT

## 2023-07-10 PROCEDURE — 6370000000 HC RX 637 (ALT 250 FOR IP): Performed by: PSYCHIATRY & NEUROLOGY

## 2023-07-10 PROCEDURE — 94640 AIRWAY INHALATION TREATMENT: CPT

## 2023-07-10 PROCEDURE — 6370000000 HC RX 637 (ALT 250 FOR IP): Performed by: FAMILY MEDICINE

## 2023-07-10 PROCEDURE — 2060000000 HC ICU INTERMEDIATE R&B

## 2023-07-10 PROCEDURE — 92526 ORAL FUNCTION THERAPY: CPT

## 2023-07-10 PROCEDURE — 2580000003 HC RX 258: Performed by: NURSE PRACTITIONER

## 2023-07-10 PROCEDURE — 36415 COLL VENOUS BLD VENIPUNCTURE: CPT

## 2023-07-10 PROCEDURE — 97535 SELF CARE MNGMENT TRAINING: CPT

## 2023-07-10 PROCEDURE — 99231 SBSQ HOSP IP/OBS SF/LOW 25: CPT | Performed by: NURSE PRACTITIONER

## 2023-07-10 PROCEDURE — 6360000002 HC RX W HCPCS: Performed by: FAMILY MEDICINE

## 2023-07-10 PROCEDURE — 6370000000 HC RX 637 (ALT 250 FOR IP): Performed by: NURSE PRACTITIONER

## 2023-07-10 RX ORDER — HEPARIN SODIUM 1000 [USP'U]/ML
40 INJECTION, SOLUTION INTRAVENOUS; SUBCUTANEOUS PRN
Status: DISCONTINUED | OUTPATIENT
Start: 2023-07-10 | End: 2023-07-11

## 2023-07-10 RX ORDER — HEPARIN SODIUM 10000 [USP'U]/100ML
5-30 INJECTION, SOLUTION INTRAVENOUS CONTINUOUS
Status: DISCONTINUED | OUTPATIENT
Start: 2023-07-10 | End: 2023-07-11

## 2023-07-10 RX ORDER — HEPARIN SODIUM 1000 [USP'U]/ML
80 INJECTION, SOLUTION INTRAVENOUS; SUBCUTANEOUS PRN
Status: DISCONTINUED | OUTPATIENT
Start: 2023-07-10 | End: 2023-07-11

## 2023-07-10 RX ADMIN — IPRATROPIUM BROMIDE 0.5 MG: 0.5 SOLUTION RESPIRATORY (INHALATION) at 13:34

## 2023-07-10 RX ADMIN — ASPIRIN 81 MG: 81 TABLET, COATED ORAL at 10:12

## 2023-07-10 RX ADMIN — LEVOTHYROXINE SODIUM 100 MCG: 0.1 TABLET ORAL at 06:55

## 2023-07-10 RX ADMIN — ACETAMINOPHEN 650 MG: 325 TABLET ORAL at 11:25

## 2023-07-10 RX ADMIN — PANTOPRAZOLE SODIUM 40 MG: 40 TABLET, DELAYED RELEASE ORAL at 10:12

## 2023-07-10 RX ADMIN — ATORVASTATIN CALCIUM 80 MG: 40 TABLET, FILM COATED ORAL at 17:34

## 2023-07-10 RX ADMIN — HEPARIN SODIUM 18 UNITS/KG/HR: 10000 INJECTION, SOLUTION INTRAVENOUS at 20:46

## 2023-07-10 RX ADMIN — SODIUM CHLORIDE: 9 INJECTION, SOLUTION INTRAVENOUS at 06:54

## 2023-07-10 RX ADMIN — IPRATROPIUM BROMIDE 0.5 MG: 0.5 SOLUTION RESPIRATORY (INHALATION) at 07:35

## 2023-07-10 RX ADMIN — FERROUS SULFATE TAB 325 MG (65 MG ELEMENTAL FE) 325 MG: 325 (65 FE) TAB at 10:12

## 2023-07-10 RX ADMIN — ARFORMOTEROL TARTRATE: 15 SOLUTION RESPIRATORY (INHALATION) at 07:35

## 2023-07-10 RX ADMIN — ARFORMOTEROL TARTRATE: 15 SOLUTION RESPIRATORY (INHALATION) at 21:28

## 2023-07-10 RX ADMIN — POLYETHYLENE GLYCOL 3350 17 G: 17 POWDER, FOR SOLUTION ORAL at 10:12

## 2023-07-10 RX ADMIN — IPRATROPIUM BROMIDE 0.5 MG: 0.5 SOLUTION RESPIRATORY (INHALATION) at 21:28

## 2023-07-10 ASSESSMENT — PAIN DESCRIPTION - ORIENTATION: ORIENTATION: RIGHT

## 2023-07-10 ASSESSMENT — PAIN SCALES - GENERAL
PAINLEVEL_OUTOF10: 4
PAINLEVEL_OUTOF10: 0

## 2023-07-10 ASSESSMENT — PAIN DESCRIPTION - DESCRIPTORS: DESCRIPTORS: ACHING

## 2023-07-10 ASSESSMENT — PAIN DESCRIPTION - LOCATION: LOCATION: LEG

## 2023-07-10 NOTE — CARE COORDINATION
Care Management Initial Assessment       RUR:15%  Readmission? No  1st IM letter given? Yes   1st  letter given: No   MARLINE- Pending referral to [facility].     07/10/23 0900   Service Assessment   Patient Orientation Other (see comment)  (Pt was asleep.)   Cognition Short Term Memory Deficit   History Provided By Child/Family;Medical Record   Primary Caregiver Family   Support Systems Children;Family Members   PCP Verified by CM Yes   Last Visit to PCP Within last 6 months   Prior Functional Level Independent in ADLs/IADLs   Current Functional Level Assistance with the following:;Bathing;Dressing; Toileting;Mobility   Can patient return to prior living arrangement Unknown at present   Ability to make needs known: Fair   Family able to assist with home care needs: Yes   Would you like for me to discuss the discharge plan with any other family members/significant others, and if so, who? Yes  Chelly Luciano- daughter)   Financial Resources None     NU spoke with pt's daughter, Chelly Luciano ([phone]) to introduce her to the role of CM and transition of care. She stated that this pt lives with she, her  and daughter in a one story home with 4 steps to enter. Pt has a walker, but does not use it. She was independent with all of her ADL's and IADL's prior to admission. CM informed the daughter that therapy and pt's physician are recommending IPR for this pt and offered her freedom of choice. She stated that she would like a referral to be sent to [facility]. NU sent a referral to [facility] via [facility].  Ashly Mcintosh

## 2023-07-10 NOTE — DISCHARGE INSTRUCTIONS
The patient needs to follow-up in the Neurology clinic in 6-8 weeks from discharge. The Neurology clinic will call the patient and/or family to schedule an appointment after discharge within the next week. The patient can be seen at the [facility] Neurology clinic or another Neurology clinic location. Please contact the clinic in the interim if any questions/concerns 21 [phone]. Discharge SNF/Rehab Instructions/LTAC       PATIENT ID: Mirta Freeman  MRN: [id number]   YOB: 1933    DATE OF ADMISSION: 7/7/2023  DATE OF DISCHARGE: 7/14/2023    PRIMARY CARE PROVIDER: @PCP@       ATTENDING PHYSICIAN: Dr. Candance Ellis  DISCHARGING PROVIDER: AYUSH Pitts NP     To contact this individual call [phone] and ask the  to page. If unavailable ask to be transferred the Adult Hospitalist Department. CONSULTATIONS: neurologist    PROCEDURES/SURGERIES: * No surgery found *    [address] COURSE:   7/7/2023:  Mirta Freeman is a 80 y.o. female with past medical history of hypertension, TIA, dementia, hypothyroidism, hyperlipidemia, GERD, asthma, lower back pain, osteoarthritis, T2 wedge compression fracture, prediabetes, osteoporosis presented as a direct admission/transfer from [facility] ED to [facility] with chief complaint of weakness of right upper extremity. Symptoms onset reported began approximately 1 week ago with weakness and numbness of right arm. Symptoms noted remain constant to the point patient is unable to lift her arm. Approximate 2 days ago, patient reportedly had fallen and was unable to move her arms or fingers. She reportedly was found in the floor with unknown downtime. According to the chart records she was last known well approximately 5 days ago. Patient denies any loss of consciousness or hitting her head. She reported is not on any blood thinners.   Work-up in the ED included CT head without IV contrast which

## 2023-07-11 LAB
ANION GAP SERPL CALC-SCNC: 6 MMOL/L (ref 5–15)
BUN SERPL-MCNC: 18 MG/DL (ref 6–20)
BUN/CREAT SERPL: 16 (ref 12–20)
CALCIUM SERPL-MCNC: 8.4 MG/DL (ref 8.5–10.1)
CHLORIDE SERPL-SCNC: 110 MMOL/L (ref 97–108)
CO2 SERPL-SCNC: 23 MMOL/L (ref 21–32)
COMMENT:: NORMAL
CREAT SERPL-MCNC: 1.16 MG/DL (ref 0.55–1.02)
ECHO BSA: 1.57 M2
GLUCOSE SERPL-MCNC: 93 MG/DL (ref 65–100)
POTASSIUM SERPL-SCNC: 4.3 MMOL/L (ref 3.5–5.1)
SODIUM SERPL-SCNC: 139 MMOL/L (ref 136–145)
SPECIMEN HOLD: NORMAL
UFH PPP CHRO-ACNC: <0.1 IU/ML

## 2023-07-11 PROCEDURE — 2060000000 HC ICU INTERMEDIATE R&B

## 2023-07-11 PROCEDURE — 6370000000 HC RX 637 (ALT 250 FOR IP): Performed by: FAMILY MEDICINE

## 2023-07-11 PROCEDURE — 94640 AIRWAY INHALATION TREATMENT: CPT

## 2023-07-11 PROCEDURE — 6370000000 HC RX 637 (ALT 250 FOR IP): Performed by: PSYCHIATRY & NEUROLOGY

## 2023-07-11 PROCEDURE — 80048 BASIC METABOLIC PNL TOTAL CA: CPT

## 2023-07-11 PROCEDURE — 6370000000 HC RX 637 (ALT 250 FOR IP): Performed by: NURSE PRACTITIONER

## 2023-07-11 PROCEDURE — 36415 COLL VENOUS BLD VENIPUNCTURE: CPT

## 2023-07-11 PROCEDURE — 6360000002 HC RX W HCPCS: Performed by: FAMILY MEDICINE

## 2023-07-11 PROCEDURE — 85520 HEPARIN ASSAY: CPT

## 2023-07-11 RX ADMIN — APIXABAN 10 MG: 5 TABLET, FILM COATED ORAL at 11:15

## 2023-07-11 RX ADMIN — POLYETHYLENE GLYCOL 3350 17 G: 17 POWDER, FOR SOLUTION ORAL at 09:36

## 2023-07-11 RX ADMIN — ATORVASTATIN CALCIUM 80 MG: 40 TABLET, FILM COATED ORAL at 18:36

## 2023-07-11 RX ADMIN — PANTOPRAZOLE SODIUM 40 MG: 40 TABLET, DELAYED RELEASE ORAL at 09:36

## 2023-07-11 RX ADMIN — IPRATROPIUM BROMIDE 0.5 MG: 0.5 SOLUTION RESPIRATORY (INHALATION) at 13:48

## 2023-07-11 RX ADMIN — IPRATROPIUM BROMIDE 0.5 MG: 0.5 SOLUTION RESPIRATORY (INHALATION) at 20:21

## 2023-07-11 RX ADMIN — ASPIRIN 81 MG: 81 TABLET, COATED ORAL at 09:36

## 2023-07-11 RX ADMIN — ACETAMINOPHEN 650 MG: 325 TABLET ORAL at 18:36

## 2023-07-11 RX ADMIN — HEPARIN SODIUM 4560 UNITS: 1000 INJECTION INTRAVENOUS; SUBCUTANEOUS at 04:20

## 2023-07-11 RX ADMIN — APIXABAN 10 MG: 5 TABLET, FILM COATED ORAL at 21:00

## 2023-07-11 RX ADMIN — IPRATROPIUM BROMIDE 0.5 MG: 0.5 SOLUTION RESPIRATORY (INHALATION) at 08:09

## 2023-07-11 RX ADMIN — LEVOTHYROXINE SODIUM 100 MCG: 0.1 TABLET ORAL at 05:41

## 2023-07-11 RX ADMIN — ARFORMOTEROL TARTRATE: 15 SOLUTION RESPIRATORY (INHALATION) at 08:09

## 2023-07-11 RX ADMIN — ARFORMOTEROL TARTRATE: 15 SOLUTION RESPIRATORY (INHALATION) at 20:21

## 2023-07-11 RX ADMIN — FERROUS SULFATE TAB 325 MG (65 MG ELEMENTAL FE) 325 MG: 325 (65 FE) TAB at 09:36

## 2023-07-11 ASSESSMENT — PAIN SCALES - GENERAL
PAINLEVEL_OUTOF10: 0
PAINLEVEL_OUTOF10: 0

## 2023-07-11 NOTE — CARE COORDINATION
Transition of Care Plan:    RUR: 14%  Prior Level of Functioning: independent  Disposition: [facility] pending auth. IPR: Date FOC offered: 7/10/23  [address] received: 7/10/23  Accepting facility: [facility]  Date authorization started:7/11/23  Date authorization received and expires: Follow up appointments:   DME needed:   Transportation at discharge: TBD  IM/IMM Medicare/ letter given:   Is patient a [place] and connected with VA? If yes, was [employer] transfer form completed and VA notified? Caregiver Contact: Nilda Grover [phone]  Discharge Caregiver contacted prior to discharge? yes  Care Conference needed? no  Barriers to discharge:  medical stability, insurance auth. CM spoke with Lynn with [facility] and she confirmed that they can accept this pt when they receive insurance auth. The Debbie Garcia was initiated today. CM will follow.  Hanny Lanier

## 2023-07-12 PROCEDURE — 6360000002 HC RX W HCPCS: Performed by: FAMILY MEDICINE

## 2023-07-12 PROCEDURE — 97535 SELF CARE MNGMENT TRAINING: CPT

## 2023-07-12 PROCEDURE — 94640 AIRWAY INHALATION TREATMENT: CPT

## 2023-07-12 PROCEDURE — 97530 THERAPEUTIC ACTIVITIES: CPT

## 2023-07-12 PROCEDURE — 2060000000 HC ICU INTERMEDIATE R&B

## 2023-07-12 PROCEDURE — 6370000000 HC RX 637 (ALT 250 FOR IP): Performed by: NURSE PRACTITIONER

## 2023-07-12 PROCEDURE — 6370000000 HC RX 637 (ALT 250 FOR IP): Performed by: FAMILY MEDICINE

## 2023-07-12 PROCEDURE — 97116 GAIT TRAINING THERAPY: CPT

## 2023-07-12 PROCEDURE — 6370000000 HC RX 637 (ALT 250 FOR IP): Performed by: PSYCHIATRY & NEUROLOGY

## 2023-07-12 RX ORDER — LACTULOSE 10 G/15ML
20 SOLUTION ORAL ONCE
Status: COMPLETED | OUTPATIENT
Start: 2023-07-12 | End: 2023-07-12

## 2023-07-12 RX ADMIN — ARFORMOTEROL TARTRATE: 15 SOLUTION RESPIRATORY (INHALATION) at 08:49

## 2023-07-12 RX ADMIN — FERROUS SULFATE TAB 325 MG (65 MG ELEMENTAL FE) 325 MG: 325 (65 FE) TAB at 09:07

## 2023-07-12 RX ADMIN — ARFORMOTEROL TARTRATE: 15 SOLUTION RESPIRATORY (INHALATION) at 20:28

## 2023-07-12 RX ADMIN — APIXABAN 10 MG: 5 TABLET, FILM COATED ORAL at 21:24

## 2023-07-12 RX ADMIN — IPRATROPIUM BROMIDE 0.5 MG: 0.5 SOLUTION RESPIRATORY (INHALATION) at 20:29

## 2023-07-12 RX ADMIN — ATORVASTATIN CALCIUM 80 MG: 40 TABLET, FILM COATED ORAL at 18:11

## 2023-07-12 RX ADMIN — IPRATROPIUM BROMIDE 0.5 MG: 0.5 SOLUTION RESPIRATORY (INHALATION) at 14:04

## 2023-07-12 RX ADMIN — PANTOPRAZOLE SODIUM 40 MG: 40 TABLET, DELAYED RELEASE ORAL at 09:07

## 2023-07-12 RX ADMIN — POLYETHYLENE GLYCOL 3350 17 G: 17 POWDER, FOR SOLUTION ORAL at 09:06

## 2023-07-12 RX ADMIN — LEVOTHYROXINE SODIUM 100 MCG: 0.1 TABLET ORAL at 06:49

## 2023-07-12 RX ADMIN — LACTULOSE 20 G: 20 SOLUTION ORAL at 18:11

## 2023-07-12 RX ADMIN — ASPIRIN 81 MG: 81 TABLET, COATED ORAL at 09:07

## 2023-07-12 RX ADMIN — APIXABAN 10 MG: 5 TABLET, FILM COATED ORAL at 09:07

## 2023-07-12 ASSESSMENT — PAIN SCALES - GENERAL: PAINLEVEL_OUTOF10: 0

## 2023-07-12 NOTE — CARE COORDINATION
Transition of Care Plan: anticipate d/c to [facility] IPR pending insurance auth, to be started today 7/12    RUR: 14%  Prior Level of Functioning: Indepedent  Disposition: [facility] IPR  If SNF or IPR: Date FOC offered: 7/10/23  Date [address] received: 7/10/23  Accepting facility: [facility]  Date authorization started with reference number: 7/12/23  Date authorization received and expires: Follow up appointments: PCP & Specialist  DME needed: defer to IPR  Transportation at discharge: w/c transport  IM/IMM Medicare/ letter given:   Is patient a [place] and connected with VA? If yes, was [employer] transfer form completed and VA notified? Caregiver Contact: Lexine Collet [phone]  Discharge Caregiver contacted prior to discharge? yes  Care Conference needed? no  Barriers to discharge: medical stability, insurance auth  -1130-CM reviewed pt chart & spoke with Benniebibregla Sorenson of [facility] [phone] who advised that she will be starting insurance auth today. Pt will be on Po Eliquis for DVT treatment per notes. CM to follow.   Tee Goldstein RN BSN CCM

## 2023-07-13 LAB
ANION GAP SERPL CALC-SCNC: 5 MMOL/L (ref 5–15)
BUN SERPL-MCNC: 25 MG/DL (ref 6–20)
BUN/CREAT SERPL: 25 (ref 12–20)
CALCIUM SERPL-MCNC: 8.3 MG/DL (ref 8.5–10.1)
CHLORIDE SERPL-SCNC: 108 MMOL/L (ref 97–108)
CO2 SERPL-SCNC: 24 MMOL/L (ref 21–32)
CREAT SERPL-MCNC: 1 MG/DL (ref 0.55–1.02)
GLUCOSE SERPL-MCNC: 106 MG/DL (ref 65–100)
POTASSIUM SERPL-SCNC: 4.4 MMOL/L (ref 3.5–5.1)
SODIUM SERPL-SCNC: 137 MMOL/L (ref 136–145)

## 2023-07-13 PROCEDURE — 6370000000 HC RX 637 (ALT 250 FOR IP): Performed by: NURSE PRACTITIONER

## 2023-07-13 PROCEDURE — 6370000000 HC RX 637 (ALT 250 FOR IP): Performed by: FAMILY MEDICINE

## 2023-07-13 PROCEDURE — 6360000002 HC RX W HCPCS: Performed by: FAMILY MEDICINE

## 2023-07-13 PROCEDURE — 97116 GAIT TRAINING THERAPY: CPT

## 2023-07-13 PROCEDURE — 97112 NEUROMUSCULAR REEDUCATION: CPT

## 2023-07-13 PROCEDURE — 97530 THERAPEUTIC ACTIVITIES: CPT

## 2023-07-13 PROCEDURE — 80048 BASIC METABOLIC PNL TOTAL CA: CPT

## 2023-07-13 PROCEDURE — 6370000000 HC RX 637 (ALT 250 FOR IP): Performed by: PSYCHIATRY & NEUROLOGY

## 2023-07-13 PROCEDURE — 94640 AIRWAY INHALATION TREATMENT: CPT

## 2023-07-13 PROCEDURE — 2060000000 HC ICU INTERMEDIATE R&B

## 2023-07-13 PROCEDURE — 36415 COLL VENOUS BLD VENIPUNCTURE: CPT

## 2023-07-13 RX ADMIN — IPRATROPIUM BROMIDE 0.5 MG: 0.5 SOLUTION RESPIRATORY (INHALATION) at 08:26

## 2023-07-13 RX ADMIN — IPRATROPIUM BROMIDE 0.5 MG: 0.5 SOLUTION RESPIRATORY (INHALATION) at 14:39

## 2023-07-13 RX ADMIN — ASPIRIN 81 MG: 81 TABLET, COATED ORAL at 08:05

## 2023-07-13 RX ADMIN — FERROUS SULFATE TAB 325 MG (65 MG ELEMENTAL FE) 325 MG: 325 (65 FE) TAB at 08:05

## 2023-07-13 RX ADMIN — APIXABAN 10 MG: 5 TABLET, FILM COATED ORAL at 21:43

## 2023-07-13 RX ADMIN — ATORVASTATIN CALCIUM 80 MG: 40 TABLET, FILM COATED ORAL at 17:05

## 2023-07-13 RX ADMIN — LEVOTHYROXINE SODIUM 100 MCG: 0.1 TABLET ORAL at 06:18

## 2023-07-13 RX ADMIN — IPRATROPIUM BROMIDE 0.5 MG: 0.5 SOLUTION RESPIRATORY (INHALATION) at 20:15

## 2023-07-13 RX ADMIN — APIXABAN 10 MG: 5 TABLET, FILM COATED ORAL at 08:05

## 2023-07-13 RX ADMIN — PANTOPRAZOLE SODIUM 40 MG: 40 TABLET, DELAYED RELEASE ORAL at 08:05

## 2023-07-13 RX ADMIN — ARFORMOTEROL TARTRATE: 15 SOLUTION RESPIRATORY (INHALATION) at 20:15

## 2023-07-13 RX ADMIN — ARFORMOTEROL TARTRATE: 15 SOLUTION RESPIRATORY (INHALATION) at 08:26

## 2023-07-13 RX ADMIN — POLYETHYLENE GLYCOL 3350 17 G: 17 POWDER, FOR SOLUTION ORAL at 08:05

## 2023-07-13 RX ADMIN — IPRATROPIUM BROMIDE 0.5 MG: 0.5 SOLUTION RESPIRATORY (INHALATION) at 02:12

## 2023-07-13 ASSESSMENT — PAIN SCALES - GENERAL
PAINLEVEL_OUTOF10: 0
PAINLEVEL_OUTOF10: 0

## 2023-07-13 NOTE — CARE COORDINATION
Transition of Care Plan: anticipate d/c to [facility] IPR pending insurance auth, to be started 7/12     RUR: 14%  Prior Level of Functioning: Indepedent  Disposition: [facility] IPR  If SNF or IPR: Date FOC offered: 7/10/23  Date [address] received: 7/10/23  Accepting facility: [facility]  Date authorization started with reference number: 7/12/23  Date authorization received and expires: Follow up appointments: PCP & Specialist  DME needed: defer to IPR  Transportation at discharge: w/c transport   IM/IMM Medicare/ letter given:   Is patient a [place] and connected with VA? If yes, was [employer] transfer form completed and VA notified? Caregiver Contact: Ady Perdomo [phone]  Discharge Caregiver contacted prior to discharge? yes  Care Conference needed? no  Barriers to discharge: medical stability, insurance auth  -1100-CM was approached by Nurse who advised pt will need a Cardiac Event Monitor placed prior to d/c and to inform her once d/c is confirmed. CM contacted, Abisai Holder of [facility] [phone] who advised that auth still remains pending at this time. CM to follow.   Conrad Estrada RN BSN CCM

## 2023-07-14 ENCOUNTER — APPOINTMENT (OUTPATIENT)
Facility: HOSPITAL | Age: 88
End: 2023-07-14
Attending: STUDENT IN AN ORGANIZED HEALTH CARE EDUCATION/TRAINING PROGRAM
Payer: MEDICARE

## 2023-07-14 VITALS
DIASTOLIC BLOOD PRESSURE: 72 MMHG | BODY MASS INDEX: 22.3 KG/M2 | WEIGHT: 133.82 LBS | HEIGHT: 65 IN | RESPIRATION RATE: 22 BRPM | TEMPERATURE: 98.6 F | SYSTOLIC BLOOD PRESSURE: 116 MMHG | HEART RATE: 83 BPM | OXYGEN SATURATION: 94 %

## 2023-07-14 PROBLEM — O22.30 DVT (DEEP VEIN THROMBOSIS) IN PREGNANCY: Status: ACTIVE | Noted: 2023-07-14

## 2023-07-14 PROBLEM — I63.9 STROKE (CEREBRUM) (HCC): Status: ACTIVE | Noted: 2023-07-14

## 2023-07-14 PROBLEM — F03.90 DEMENTIA (HCC): Status: ACTIVE | Noted: 2023-07-14

## 2023-07-14 PROBLEM — N17.9 AKI (ACUTE KIDNEY INJURY) (HCC): Status: ACTIVE | Noted: 2022-03-07

## 2023-07-14 LAB — ECHO BSA: 1.57 M2

## 2023-07-14 PROCEDURE — 6370000000 HC RX 637 (ALT 250 FOR IP): Performed by: FAMILY MEDICINE

## 2023-07-14 PROCEDURE — 93270 REMOTE 30 DAY ECG REV/REPORT: CPT

## 2023-07-14 PROCEDURE — 97530 THERAPEUTIC ACTIVITIES: CPT

## 2023-07-14 PROCEDURE — 6370000000 HC RX 637 (ALT 250 FOR IP): Performed by: NURSE PRACTITIONER

## 2023-07-14 PROCEDURE — 94640 AIRWAY INHALATION TREATMENT: CPT

## 2023-07-14 PROCEDURE — 6360000002 HC RX W HCPCS: Performed by: FAMILY MEDICINE

## 2023-07-14 PROCEDURE — 97535 SELF CARE MNGMENT TRAINING: CPT

## 2023-07-14 RX ORDER — ATORVASTATIN CALCIUM 80 MG/1
80 TABLET, FILM COATED ORAL EVERY EVENING
Qty: 30 TABLET | Refills: 0 | Status: SHIPPED | OUTPATIENT
Start: 2023-07-14

## 2023-07-14 RX ORDER — ASPIRIN 81 MG/1
81 TABLET ORAL DAILY
Qty: 30 TABLET | Refills: 1 | Status: SHIPPED | OUTPATIENT
Start: 2023-07-15

## 2023-07-14 RX ADMIN — PANTOPRAZOLE SODIUM 40 MG: 40 TABLET, DELAYED RELEASE ORAL at 09:20

## 2023-07-14 RX ADMIN — APIXABAN 10 MG: 5 TABLET, FILM COATED ORAL at 09:19

## 2023-07-14 RX ADMIN — LEVOTHYROXINE SODIUM 100 MCG: 0.1 TABLET ORAL at 06:31

## 2023-07-14 RX ADMIN — IPRATROPIUM BROMIDE 0.5 MG: 0.5 SOLUTION RESPIRATORY (INHALATION) at 09:34

## 2023-07-14 RX ADMIN — FERROUS SULFATE TAB 325 MG (65 MG ELEMENTAL FE) 325 MG: 325 (65 FE) TAB at 09:20

## 2023-07-14 RX ADMIN — ARFORMOTEROL TARTRATE: 15 SOLUTION RESPIRATORY (INHALATION) at 09:34

## 2023-07-14 RX ADMIN — IPRATROPIUM BROMIDE 0.5 MG: 0.5 SOLUTION RESPIRATORY (INHALATION) at 02:07

## 2023-07-14 RX ADMIN — POLYETHYLENE GLYCOL 3350 17 G: 17 POWDER, FOR SOLUTION ORAL at 09:20

## 2023-07-14 RX ADMIN — ASPIRIN 81 MG: 81 TABLET, COATED ORAL at 09:19

## 2023-07-14 ASSESSMENT — ENCOUNTER SYMPTOMS
GASTROINTESTINAL NEGATIVE: 1
RESPIRATORY NEGATIVE: 1

## 2023-07-14 NOTE — PROGRESS NOTES
2000-Shift report received from [facility]. While rounding on pt, plan for this shift discussed. Pt refused bath until the AM, and refused bloodwork tonight. Liss Grills keep poking me and they can't get anything from me. \" Per pt report. Labs and bath refused. 0730-Shift report given to [facility], RN.
Bedside RN performed patient education and medication education. Discharge concerns initiated and discussed with patient, including clarification on \"who\" assists the patient at their home and instructions for when the home going patient should call their provider after discharge. Opportunity for questions and clarification was provided. Patient receptive to education:Yes  Patient stated: OK  Barriers to Education: none  Diagnosis Education given:  Yes    Length of stay: 7  Expected Day of Discharge: 7/14/23  Ask if they have \"Help at Home\" & add to white board?   Yes    Education Day #: today    Medication Education Given:  Yes  M in the box Medication name: Pt's    Pt aware of HCAHPS survey: Yes    Stroke Education documented in Patient Education: Yes  Core Measures Documented in Connect Care:  Risk Factors: Yes  Warning signs of stroke: Yes  When to Activate 911: Yes  Medication Education for Risk Factors: Yes  Smoking cessation if applicable: Yes  Written Education Given:  Yes    Discharge NIH Completed: Yes  Score: 2    BRAINS: Yes    Follow Up Appointment Made: No  Date/Time if applicable: JORDYND
Hospitalist Progress Note  AYUSH Campbell NP  Answering service:         Date of Service:  2023  NAME:  Tal Lemus  :  1933  MRN:  [id number]      Admission Summary:     As per H&P: Tal Lemus is a 80 y.o. female with past medical history of hypertension, TIA, dementia, hypothyroidism, hyperlipidemia, GERD, asthma, lower back pain, osteoarthritis, T2 wedge compression fracture, prediabetes, osteoporosis presented as a direct admission/transfer from [facility] ED to [facility] with chief complaint of weakness of right upper extremity. Symptoms onset reported began approximately 1 week ago with weakness and numbness of right arm. Symptoms noted remain constant to the point patient is unable to lift her arm. Approximate 2 days ago, patient reportedly had fallen and was unable to move her arms or fingers. She reportedly was found in the floor with unknown downtime. According to the chart records she was last known well approximately 5 days ago. Patient denies any loss of consciousness or hitting her head. She reported is not on any blood thinners. Work-up in the ED included CT head without IV contrast which showed no acute intracranial normalities with right sphenoid sinus mucosal thickening and fluid questionable for acute sinusitis. CT cervical spine without IV contrast showed no acute abnormality with multilevel degenerative changes. Abnormal labs include chloride 110, BUN 33, creatinine 1.57, GFR 31, blood glucose 125, CK23, albumin 3.3, hemoglobin 9.7. Request was for transfer to [facility].  No reports of slurred speech, facial droop, visual disturbance, syncope, chest pain, shortness of breath, abdominal pain, new onset bowel or bladder incontinence compared to baseline. \"       Interval history / Subjective:       I saw the patient
Hospitalist Progress Note  AYUSH Macias NP  Answering service:         Date of Service:  7/10/2023  NAME:  Chi Beckford  :  1933  MRN:  [id number]      Admission Summary:     As per H&P: Chi Beckford is a 80 y.o. female with past medical history of hypertension, TIA, dementia, hypothyroidism, hyperlipidemia, GERD, asthma, lower back pain, osteoarthritis, T2 wedge compression fracture, prediabetes, osteoporosis presented as a direct admission/transfer from [facility] ED to [facility] with chief complaint of weakness of right upper extremity. Symptoms onset reported began approximately 1 week ago with weakness and numbness of right arm. Symptoms noted remain constant to the point patient is unable to lift her arm. Approximate 2 days ago, patient reportedly had fallen and was unable to move her arms or fingers. She reportedly was found in the floor with unknown downtime. According to the chart records she was last known well approximately 5 days ago. Patient denies any loss of consciousness or hitting her head. She reported is not on any blood thinners. Work-up in the ED included CT head without IV contrast which showed no acute intracranial normalities with right sphenoid sinus mucosal thickening and fluid questionable for acute sinusitis. CT cervical spine without IV contrast showed no acute abnormality with multilevel degenerative changes. Abnormal labs include chloride 110, BUN 33, creatinine 1.57, GFR 31, blood glucose 125, CK23, albumin 3.3, hemoglobin 9.7. Request was for transfer to [facility].  No reports of slurred speech, facial droop, visual disturbance, syncope, chest pain, shortness of breath, abdominal pain, new onset bowel or bladder incontinence compared to baseline. \"       Interval history / Subjective:     Saw the patient
Hospitalist Progress Note  AYUSH Man NP  Answering service:         Date of Service:  2023  NAME:  Thad Lombard  :  1933  MRN:  [id number]      Admission Summary:     As per H&P: Thad Lombard is a 80 y.o. female with past medical history of hypertension, TIA, dementia, hypothyroidism, hyperlipidemia, GERD, asthma, lower back pain, osteoarthritis, T2 wedge compression fracture, prediabetes, osteoporosis presented as a direct admission/transfer from [facility] ED to [facility] with chief complaint of weakness of right upper extremity. Symptoms onset reported began approximately 1 week ago with weakness and numbness of right arm. Symptoms noted remain constant to the point patient is unable to lift her arm. Approximate 2 days ago, patient reportedly had fallen and was unable to move her arms or fingers. She reportedly was found in the floor with unknown downtime. According to the chart records she was last known well approximately 5 days ago. Patient denies any loss of consciousness or hitting her head. She reported is not on any blood thinners. Work-up in the ED included CT head without IV contrast which showed no acute intracranial normalities with right sphenoid sinus mucosal thickening and fluid questionable for acute sinusitis. CT cervical spine without IV contrast showed no acute abnormality with multilevel degenerative changes. Abnormal labs include chloride 110, BUN 33, creatinine 1.57, GFR 31, blood glucose 125, CK23, albumin 3.3, hemoglobin 9.7. Request was for transfer to [facility].  No reports of slurred speech, facial droop, visual disturbance, syncope, chest pain, shortness of breath, abdominal pain, new onset bowel or bladder incontinence compared to baseline. \"       Interval history / Subjective:   Denies any discomfort.
Monitor applied
Neurology Progress Note     NAME: Ld Ramirez   :  1933   MRN:  [id number]   DATE:  7/10/2023    Assessment:     Principal Problem:    Weakness of right upper extremity  Resolved Problems:    * No resolved hospital problems. *    Patient is an 80year-old R-handed female with history of dementia, HTN, HLD, prediabetes, TIA not on APT/OAC PTA, who was admitted on 23 as transfer from [facility] with RUE weakness and numbness that started over the last week with a fall on 23. CT head was negative. MRI brain wo contrast showed numerous acute infarcts in the L MCA, there are multiple chronic infarcts and CIWM changes bilaterally. MRA head/neck showed no flow-limiting stenosis. Carotid dopplers L ICA <50% stenosis. There was no stenosis of R ICA. TTE EF 55-60%, LA normal. No valve abnormalities. A1c 4.3, LDL 84.8  Plan:   1.) Acute L MCA infarcts:  - Concern for vessel to vessel thromboembolism but cannot exclude cardioembolic source since vessel imaging negative. - Needs outpatient cardiac monitoring with 14-day or longer monitor per Cardiology  - Continue aspirin 81 mg daily  - Continue atorvastatin 80 mg daily for goal LDL <70  - Goal normotension SBP <140/90  - Prediabetes well controlled  - PT/OT, SLP following    No further recommendations. Patient will need to f/u in the clinic in 6-10 weeks from discharge. Subjective:   No acute complaints.      Objective:   Chart reviewed since last seen    Current Facility-Administered Medications   Medication Dose Route Frequency    atorvastatin (LIPITOR) tablet 80 mg  80 mg Oral QPM    ondansetron (ZOFRAN-ODT) disintegrating tablet 4 mg  4 mg Oral Q8H PRN    Or    ondansetron (ZOFRAN) injection 4 mg  4 mg IntraVENous Q6H PRN    polyethylene glycol (GLYCOLAX) packet 17 g  17 g Oral Daily PRN    aspirin EC tablet 81 mg
Occupational Therapy  7/11/2023    Chart reviewed and noted patient with acute RLE DVT. Patient is outside of therapeutic range (anti XA <.10), not appropriate for OT session on this date. Will defer and continue to follow. Thank you.      Radha Stone, JOHN PAUL, OTR/L
Physical Therapy 7/10/23    Chart reviewed, during OT session earlier today, patient with complaint of R calf pain and heat. Doppler order placed and pending to rule out DVT. Will defer and follow up as able/appropriate.     Thank you for your consideration,    Ernesto Call, PT, DPT
Physical Therapy 7/11/23    Chart reviewed, patient with acute RLE DVTs, started on Heparin this AM. Last Anti-Xa draw <.10. Not within therapeutic range (.3-.7) and not appropriate for PT treatment at this time. Will defer and follow up as able/appropriate.     Thank you for your consideration,    Paulino Humphries, PT, DPT
Physician Progress Note      PATIENT:               Abby Howard  CSN #:                  [id number]  :                       1933  ADMIT DATE:       2023 11:42 PM  [address] [facility] DATE:  RESPONDING  PROVIDER #:        Douglas Shipley NP        QUERY TEXT:    Stage of Chronic Kidney Disease: Please provide further specificity, if known. Clinical indicators include: bun, creatinine, gfr, chronic kidney disease  Options provided:  -- Chronic kidney disease stage 1  -- Chronic kidney disease stage 2  -- Chronic kidney disease stage 3  -- Chronic kidney disease stage 3a  -- Chronic kidney disease stage 3b  -- Chronic kidney disease stage 4  -- Chronic kidney disease stage 5  -- Chronic kidney disease stage 5, requiring dialysis  -- End stage renal disease  -- Other - I will add my own diagnosis  -- Disagree - Not applicable / Not valid  -- Disagree - Clinically Unable to determine / Unknown        PROVIDER RESPONSE TEXT:    The patient has chronic kidney disease stage 3. QUERY TEXT:    Pt admitted with CVA. Pt noted to have Weakness of right upper extremity. If   possible, please document in progress notes and discharge summary the   relationship, if any, between Weakness of right upper extremity and CVA. The medical record reflects the following:  Risk Factors: CVA  Clinical Indicators:   PN  Acute CVA  Weakness of right upper extremity  -MRI brain consistent with numerous small foci of restricted diffusion in the   left cerebral hemisphere      Treatment: OT, PT    Thank you,  Lena Holcomb RN, CCDS, [facility]  Certified Clinical Documentation   [phone]  you can also reach me by perfect serve.   Options provided:  -- Weakness of right upper extremity due to CVA  -- Weakness of right upper extremity unrelated to CVA  -- Other - I will add my own diagnosis  -- Disagree - Not applicable / Not valid  -- Disagree - Clinically unable to determine / Unknown  -- Refer to Clinical
Physician Progress Note      PATIENT:               Cecilia Townsend  CSN #:                  [id number]  :                       1933  ADMIT DATE:       2023 11:42 PM  DIS[facility] DATE:  RESPONDING  PROVIDER #:        Roger Chowdary NP          QUERY TEXT:    Pt admitted with Left MCA infarcts. Pt noted to have Acute dvt noted in right   CFV, Fem, Pop. If possible, please document in progress notes and discharge   summary the present on admission status of Acute dvt noted in right CFV, Fem,   Pop:    The medical record reflects the following:  Risk Factors: Acute dvt noted in right CFV, Fem, Pop  Clinical Indicators:   PN  DVT  Acute dvt noted in right CFV, Fem, Pop    Treatment: heparin drip  Thank you,  Kavita Walker RN, CCDS, [facility]  Certified Clinical Documentation   [phone]  you can also reach me by perfect serve. Options provided:  -- Yes, Acute dvt noted in right CFV, Fem, Pop was present at the time of the   order to admit to the hospital  -- No, Acute dvt noted in right CFV, Fem, Pop was not present on admission and   developed during the inpatient stay  -- Other - I will add my own diagnosis  -- Disagree - Not applicable / Not valid  -- Disagree - Clinically unable to determine / Unknown  -- Refer to Clinical Documentation Reviewer    PROVIDER RESPONSE TEXT:    No, Acute dvt noted in right CFV, Fem, Pop was not present on admission and   developed during the inpatient stay.     Query created by: Nalini Vick on 2023 12:44 PM      Electronically signed by:  Roger Chowdary NP 2023 7:18 AM
Received a call from Dr. Haseeb Rodriguez, pt found to have extensive right LE DVTs and needs to start on heparin gtt. Pt had onset of her stroke sxs 1 week prior to admission and has been here for 3 days. All of her strokes are small. Low risk for hemorrhagic conversion given time since onset and size of strokes. Agree that benefit of heparin for DVTs outweighs risk at this point. Dr. Surjit Birch will start hep gtt without bolus.
Two week monitor placed to eval for AF given CVA. Dr. Pedro Pablo Fletcher to read. Patient diagnosed with DVT and will be on anticoagulation.
midline/nonedematous. Neck: Supple, no JVD, no meningeal signs. No carotid bruits. Trachea midline. Respiratory/ Chest: Clear in apex with dec  CVS: RRR, S1 S2 heard, no murmurs/rubs/gallops  GI/ Abd: Soft, non-tender, non-distended, +bowel sounds   Musculoskeletal/Ext: No clubbing, no cyanosis, no edema  Neuro: Sensation is intact, Strength 5/5 left upper extremity and bilateral lower extremities. RUE weaker than left. No slurred speech. No facial droop. Psych: A&O to self and place  Cap refill: Brisk, less than 3 seconds  Integument/ Skin: Warm, dry, without rashes or lesions           Data Review:    Review and/or order of clinical lab test  Review and/or order of tests in the radiology section of CPT  Review and/or order of tests in the medicine section of CPT    I have independently reviewed and interpreted patient's lab and all other diagnostic data    Notes reviewed from all clinical/nonclinical/nursing services involved in patient's clinical care. Care coordination discussions were held with appropriate clinical/nonclinical/ nursing providers based on care coordination needs. Labs:     Recent Labs     07/09/23  1218   WBC 7.5   HGB 7.9*   HCT 25.5*          Recent Labs     07/09/23  1218 07/11/23  0218    139   K 3.7 4.3   * 110*   CO2 24 23   BUN 17 18       No results for input(s): ALT, TP, ALB, GLOB, GGT, AML in the last 72 hours. Invalid input(s): SGOT, GPT, AP, TBIL, TBILI, AMYP, LPSE, HLPSE    No results for input(s): INR, APTT in the last 72 hours. Invalid input(s): PTP     No results for input(s): TIBC, FERR in the last 72 hours. Invalid input(s): FE, PSAT   No results found for: FOL, RBCF   No results for input(s): PH, PCO2, PO2 in the last 72 hours. No results for input(s): CPK in the last 72 hours.     Invalid input(s): CPKMB, CKNDX, TROIQ  Lab Results   Component Value Date/Time    CHOL 148 07/08/2023 03:15 AM    HDL 51 07/08/2023 03:15 AM     No results

## 2023-07-14 NOTE — CARE COORDINATION
Transition of Care Plan: anticipate d/c to [facility] IPR pending insurance auth, to be started 7/12     RUR: 14%  Prior Level of Functioning: Indepedent  Disposition: [facility] IPR  If SNF or IPR: Date FOC offered: 7/10/23  Date [address] received: 7/10/23  Accepting facility: [facility]  Date authorization started with reference number: 7/12/23  Date authorization received and expires: Follow up appointments: PCP & Specialist  DME needed: defer to IPR  Transportation at discharge: w/c transport   IM/IMM Medicare/ letter given:   Is patient a [place] and connected with VA? If yes, was [employer] transfer form completed and VA notified? Caregiver Contact: Leo Ojeda [phone]  Discharge Caregiver contacted prior to discharge? yes  Care Conference needed? no  Barriers to discharge: medical stability, insurance auth  -0900-CM reviewed pt chart and was informed by Guille Madison, [facility] liaison, [phone] that insurance auth still remains pending at this time.   Alexandria Mccartney RN BSN CCM

## 2023-07-14 NOTE — DISCHARGE SUMMARY
Discharge Summary       PATIENT ID: Donato Tello  MRN: [id number]   YOB: 1933    DATE OF ADMISSION: 7/7/2023 11:42 PM    DATE OF DISCHARGE: 7/14/2023   PRIMARY CARE PROVIDER: Nataliia Guevara MD     ATTENDING PHYSICIAN: Dr. Benitez Ku  DISCHARGING PROVIDER: AYUSH Villanueva NP    To contact this individual call [phone] and ask the  to page. If unavailable ask to be transferred the Adult Hospitalist Department. CONSULTATIONS: IP CONSULT TO NEUROLOGY  IP CONSULT TO CASE MANAGEMENT  IP CONSULT TO PHYSICAL MEDICINE REHAB  IP CONSULT TO CARDIOLOGY    PROCEDURES/SURGERIES: * No surgery found *     [address] COURSE:     7/7/2023:  Donato Tello is a 80 y.o. female with past medical history of hypertension, TIA, dementia, hypothyroidism, hyperlipidemia, GERD, asthma, lower back pain, osteoarthritis, T2 wedge compression fracture, prediabetes, osteoporosis presented as a direct admission/transfer from [facility] ED to [facility] with chief complaint of weakness of right upper extremity. Symptoms onset reported began approximately 1 week ago with weakness and numbness of right arm. Symptoms noted remain constant to the point patient is unable to lift her arm. Approximate 2 days ago, patient reportedly had fallen and was unable to move her arms or fingers. She reportedly was found in the floor with unknown downtime. According to the chart records she was last known well approximately 5 days ago. Patient denies any loss of consciousness or hitting her head. She reported is not on any blood thinners. Work-up in the ED included CT head without IV contrast which showed no acute intracranial normalities with right sphenoid sinus mucosal thickening and fluid questionable for acute sinusitis. CT cervical spine without IV contrast showed no acute abnormality with multilevel degenerative changes.   Abnormal labs include chloride 110, BUN 33,

## 2023-07-14 NOTE — CARE COORDINATION
Transition of Care Plan: anticipate d/c to [facility] IPR today  ROOM [address]  Accepting Doctor: Jd Mejias  Report #: [phone]    Hospital to Home transport scheduled for 1:30pm        RUR: 14%  Prior Level of Functioning: Indepedent  Disposition: [facility] IPR  If SNF or IPR: Date FOC offered: 7/10/23  Date [address] received: 7/10/23  Accepting facility: [facility]  Date authorization started with reference number: 7/12/23  Date authorization received and expires: Follow up appointments: PCP & Specialist  DME needed: defer to IPR  Transportation at discharge: w/c transport   IM/IMM Medicare/ letter given:7/14/23   Caregiver Contact: Rosa Moreau [phone]  Discharge Caregiver contacted prior to discharge? yes  Care Conference needed? no  Barriers to discharge: none  -1100-CM was informed by Trudie Koyanagi of [facility] [phone] that pt's insurance [employer] was approved. CM met with pt at bedside and she is agreeable to d/c plan. CM attempted to reach pt's dtr, Shellie Stuart by phone multiple times and left VM. Pt stated she cannot afford transport and has no other family for CM to contact. CM received approval from [address] [place] [address] [facility] [place] assistance with [facility] w/c transport. 1200-CM set up [facility] transport for 1:30pm and informed nurse and pt of d/c plan.   Jose Luis Rain RN BSN [facility]

## 2023-07-14 NOTE — PLAN OF CARE
Problem: Discharge Planning  Goal: Discharge to home or other facility with appropriate resources  7/10/2023 1252 by Shauna Angulo RN  Outcome: Progressing  Flowsheets (Taken 7/10/2023 0800)  Discharge to home or other facility with appropriate resources:   Identify barriers to discharge with patient and caregiver   Arrange for needed discharge resources and transportation as appropriate   Identify discharge learning needs (meds, wound care, etc)   Arrange for interpreters to assist at discharge as needed   Refer to discharge planning if patient needs post-hospital services based on physician order or complex needs related to functional status, cognitive ability or social support system  7/10/2023 0426 by Karen Ware RN  Outcome: Progressing  Flowsheets (Taken 7/9/2023 2037)  Discharge to home or other facility with appropriate resources: Identify discharge learning needs (meds, wound care, etc)     Problem: Safety - Adult  Goal: Free from fall injury  7/10/2023 1252 by Shauna Angulo RN  Outcome: Progressing  Flowsheets (Taken 7/10/2023 0800)  Free From Fall Injury:   Instruct family/caregiver on patient safety   Based on caregiver fall risk screen, instruct family/caregiver to ask for assistance with transferring infant if caregiver noted to have fall risk factors  7/10/2023 0426 by Karen Ware RN  Outcome: Progressing     Problem: Chronic Conditions and Co-morbidities  Goal: Patient's chronic conditions and co-morbidity symptoms are monitored and maintained or improved  Outcome: Progressing  Flowsheets (Taken 7/10/2023 0800)  Care Plan - Patient's Chronic Conditions and Co-Morbidity Symptoms are Monitored and Maintained or Improved:   Monitor and assess patient's chronic conditions and comorbid symptoms for stability, deterioration, or improvement   Collaborate with multidisciplinary team to address chronic and comorbid conditions and prevent exacerbation or deterioration   Update acute care plan with
Problem: Discharge Planning  Goal: Discharge to home or other facility with appropriate resources  7/12/2023 0146 by Penelope Poretr RN  Outcome: Progressing  Flowsheets (Taken 7/11/2023 2003)  Discharge to home or other facility with appropriate resources:   Identify barriers to discharge with patient and caregiver   Arrange for needed discharge resources and transportation as appropriate   Identify discharge learning needs (meds, wound care, etc)   Refer to discharge planning if patient needs post-hospital services based on physician order or complex needs related to functional status, cognitive ability or social support system  7/11/2023 1226 by Dorinda Swift RN  Outcome: Progressing  Flowsheets (Taken 7/11/2023 0800)  Discharge to home or other facility with appropriate resources: Identify barriers to discharge with patient and caregiver     Problem: Safety - Adult  Goal: Free from fall injury  7/12/2023 0146 by Penelope Porter RN  Outcome: Progressing  [address] (Taken 7/11/2023 2000)  Free From Fall Injury:   Instruct family/caregiver on patient safety   Based on caregiver fall risk screen, instruct family/caregiver to ask for assistance with transferring infant if caregiver noted to have fall risk factors  7/11/2023 1226 by Dorinda Swift RN  Outcome: Progressing  Flowsheets (Taken 7/11/2023 0800)  Free From Fall Injury: Instruct family/caregiver on patient safety     Problem: Chronic Conditions and Co-morbidities  Goal: Patient's chronic conditions and co-morbidity symptoms are monitored and maintained or improved  7/12/2023 0146 by Penelope Portre RN  Outcome: Progressing  Flowsheets (Taken 7/11/2023 2003)  Care Plan - Patient's Chronic Conditions and Co-Morbidity Symptoms are Monitored and Maintained or Improved:   Monitor and assess patient's chronic conditions and comorbid symptoms for stability, deterioration, or improvement   Collaborate with multidisciplinary team to address chronic and comorbid
Problem: Discharge Planning  Goal: Discharge to home or other facility with appropriate resources  7/12/2023 1126 by Sam Dalton RN  Outcome: Progressing  Flowsheets (Taken 7/12/2023 0800)  Discharge to home or other facility with appropriate resources: Identify barriers to discharge with patient and caregiver  7/12/2023 0146 by Chase Link RN  Outcome: Progressing  Flowsheets  Taken 7/11/2023 2003  Discharge to home or other facility with appropriate resources:   Identify barriers to discharge with patient and caregiver   Arrange for needed discharge resources and transportation as appropriate   Identify discharge learning needs (meds, wound care, etc)   Refer to discharge planning if patient needs post-hospital services based on physician order or complex needs related to functional status, cognitive ability or social support system  Taken 7/11/2023 2000  Discharge to home or other facility with appropriate resources:   Identify barriers to discharge with patient and caregiver   Arrange for needed discharge resources and transportation as appropriate   Identify discharge learning needs (meds, wound care, etc)   Refer to discharge planning if patient needs post-hospital services based on physician order or complex needs related to functional status, cognitive ability or social support system     Problem: Safety - Adult  Goal: Free from fall injury  7/12/2023 1126 by Sam Dalton RN  Outcome: Progressing  Flowsheets (Taken 7/12/2023 0800)  Free From Fall Injury: Instruct family/caregiver on patient safety  7/12/2023 0146 by Chase Link RN  Outcome: Progressing  [address] (Taken 7/11/2023 2000)  Free From Fall Injury:   Instruct family/caregiver on patient safety   Based on caregiver fall risk screen, instruct family/caregiver to ask for assistance with transferring infant if caregiver noted to have fall risk factors     Problem: Chronic Conditions and Co-morbidities  Goal: Patient's chronic conditions
Problem: Discharge Planning  Goal: Discharge to home or other facility with appropriate resources  Outcome: Progressing  Flowsheets (Taken 7/11/2023 0800)  Discharge to home or other facility with appropriate resources: Identify barriers to discharge with patient and caregiver     Problem: Safety - Adult  Goal: Free from fall injury  Outcome: Progressing  Flowsheets (Taken 7/11/2023 0800)  Free From Fall Injury: Instruct family/caregiver on patient safety     Problem: Chronic Conditions and Co-morbidities  Goal: Patient's chronic conditions and co-morbidity symptoms are monitored and maintained or improved  Outcome: Progressing  Flowsheets (Taken 7/11/2023 0800)  Care Plan - Patient's Chronic Conditions and Co-Morbidity Symptoms are Monitored and Maintained or Improved: Monitor and assess patient's chronic conditions and comorbid symptoms for stability, deterioration, or improvement     Problem: Skin/Tissue Integrity  Goal: Absence of new skin breakdown  Description: 1. Monitor for areas of redness and/or skin breakdown  2. Assess vascular access sites hourly  3. Every 4-6 hours minimum:  Change oxygen saturation probe site  4. Every 4-6 hours:  If on nasal continuous positive airway pressure, respiratory therapy assess nares and determine need for appliance change or resting period.   Outcome: Progressing
Problem: Discharge Planning  Goal: Discharge to home or other facility with appropriate resources  Outcome: Progressing  Flowsheets (Taken 7/9/2023 2037)  Discharge to home or other facility with appropriate resources: Identify discharge learning needs (meds, wound care, etc)     Problem: Safety - Adult  Goal: Free from fall injury  Outcome: Progressing
Problem: Occupational Therapy - Adult  Goal: By Discharge: Performs self-care activities at highest level of function for planned discharge setting. See evaluation for individualized goals. Description: FUNCTIONAL STATUS PRIOR TO ADMISSION:  Patient was independent with Adls and mobility. Hx of dementia/memory impairment. Receives Help From: Family, ADL Assistance: Independent, Ambulation Assistance: Independent, Transfer Assistance: Independent,        HOME SUPPORT: Patient lived daughter. Occupational Therapy Goals  Initiated 7/9/2023   1. Patient will perform lower body dressing with Minimal Assist within 7 day(s). 2.  Patient will perform upper body dressing with Minimal Assist within 7 day(s). 3.  Patient will perform grooming standing at sink with Set-up and Supervision within 7 day(s). 4.  Patient will perform toilet transfers with Minimal Assist within 7 day(s). 5.  Patient will perform all aspects of toileting with Minimal Assist within 7 day(s). 6.  Patient will participate in upper extremity therapeutic exercise/activities with Minimal Assist within 7 day(s). 7.  Patient will utilize energy conservation techniques during functional activities with verbal cues within 7 day(s). 8.  Patient will improve their Fugl Paz score by 5 points in prep for ADLs within 7 days. Outcome: Progressing    OCCUPATIONAL THERAPY TREATMENT  Patient: Mirella Benitez (78 y.o. female)  Date: 7/13/2023  Primary Diagnosis: Weakness of right upper extremity [R29.898]       Precautions: Fall Risk                Chart, occupational therapy assessment, plan of care, and goals were reviewed. ASSESSMENT  Patient continues to benefit from skilled OT services and is progressing towards goals. Pt continues to demonstrate decreased R hand strength (weaker distally than proximally), fine motor coordination, balance, and proprioception.  Min A x 1 to stand but pt does well with A x 2  during functional mobility (1
Problem: Occupational Therapy - Adult  Goal: By Discharge: Performs self-care activities at highest level of function for planned discharge setting. See evaluation for individualized goals. Description: FUNCTIONAL STATUS PRIOR TO ADMISSION:  Patient was independent with Adls and mobility. Hx of dementia/memory impairment. Receives Help From: Family, ADL Assistance: Independent, Ambulation Assistance: Independent, Transfer Assistance: Independent,        HOME SUPPORT: Patient lived daughter. Occupational Therapy Goals  Initiated 7/9/2023   1. Patient will perform lower body dressing with Minimal Assist within 7 day(s). 2.  Patient will perform upper body dressing with Minimal Assist within 7 day(s). 3.  Patient will perform grooming standing at sink with Set-up and Supervision within 7 day(s). 4.  Patient will perform toilet transfers with Minimal Assist within 7 day(s). 5.  Patient will perform all aspects of toileting with Minimal Assist within 7 day(s). 6.  Patient will participate in upper extremity therapeutic exercise/activities with Minimal Assist within 7 day(s). 7.  Patient will utilize energy conservation techniques during functional activities with verbal cues within 7 day(s). 8.  Patient will improve their Fugl Paz score by 5 points in prep for ADLs within 7 days. Outcome: Progressing   OCCUPATIONAL THERAPY TREATMENT  Patient: Alysia Jackson (44 y.o. female)  Date: 7/12/2023  Primary Diagnosis: Weakness of right upper extremity [R29.898]       Precautions: Fall Risk                Chart, occupational therapy assessment, plan of care, and goals were reviewed. ASSESSMENT  Patient continues to benefit from skilled OT services and is progressing towards goals. Patient with improving RUE activation on this date. Patient with 3 to 3+/5 strength in RUE shoulder flexion, tricep extension, bicep flexion, IR/ER; -2/5 R forearm supination/pronation.   Distal RUE finger flexion/extension
Problem: Occupational Therapy - Adult  Goal: By Discharge: Performs self-care activities at highest level of function for planned discharge setting. See evaluation for individualized goals. Description: FUNCTIONAL STATUS PRIOR TO ADMISSION:  Patient was independent with Adls and mobility. Hx of dementia/memory impairment. Receives Help From: Family, ADL Assistance: Independent, Ambulation Assistance: Independent, Transfer Assistance: Independent,        HOME SUPPORT: Patient lived daughter. Occupational Therapy Goals  Initiated 7/9/2023   1. Patient will perform lower body dressing with Minimal Assist within 7 day(s). 2.  Patient will perform upper body dressing with Minimal Assist within 7 day(s). 3.  Patient will perform grooming standing at sink with Set-up and Supervision within 7 day(s). 4.  Patient will perform toilet transfers with Minimal Assist within 7 day(s). 5.  Patient will perform all aspects of toileting with Minimal Assist within 7 day(s). 6.  Patient will participate in upper extremity therapeutic exercise/activities with Minimal Assist within 7 day(s). 7.  Patient will utilize energy conservation techniques during functional activities with verbal cues within 7 day(s). 8.  Patient will improve their Fugl Paz score by 5 points in prep for ADLs within 7 days. Outcome: Progressing   OCCUPATIONAL THERAPY TREATMENT  Patient: Lucille Ndiaye (50 y.o. female)  Date: 7/10/2023  Primary Diagnosis: Weakness of right upper extremity [R29.898]       Precautions: Fall Risk                Chart, occupational therapy assessment, plan of care, and goals were reviewed. ASSESSMENT  Patient continues to benefit from skilled OT services and is progressing towards goals. Patient continues to require up to min A/mod A x1 to complete functional transfers and up to max A to complete toileting tasks.  Patient reporting hypersensitivity on RUE/RLE on this date, elevated pain levels with pressure
Problem: Occupational Therapy - Adult  Goal: By Discharge: Performs self-care activities at highest level of function for planned discharge setting. See evaluation for individualized goals. Description: FUNCTIONAL STATUS PRIOR TO ADMISSION:  Patient was independent with Adls and mobility. Hx of dementia/memory impairment. Receives Help From: Family, ADL Assistance: Independent, Ambulation Assistance: Independent, Transfer Assistance: Independent,        HOME SUPPORT: Patient lived daughter. Occupational Therapy Goals  Initiated 7/9/2023   1. Patient will perform lower body dressing with Minimal Assist within 7 day(s). 2.  Patient will perform upper body dressing with Minimal Assist within 7 day(s). 3.  Patient will perform grooming standing at sink with Set-up and Supervision within 7 day(s). 4.  Patient will perform toilet transfers with Minimal Assist within 7 day(s). 5.  Patient will perform all aspects of toileting with Minimal Assist within 7 day(s). 6.  Patient will participate in upper extremity therapeutic exercise/activities with Minimal Assist within 7 day(s). 7.  Patient will utilize energy conservation techniques during functional activities with verbal cues within 7 day(s). 8.  Patient will improve their Fugl Paz score by 5 points in prep for ADLs within 7 days. Outcome: Progressing   OCCUPATIONAL THERAPY TREATMENT  Patient: Ruchi Ramos (27 y.o. female)  Date: 7/14/2023  Primary Diagnosis: Weakness of right upper extremity [R29.898]       Precautions: Fall Risk                Chart, occupational therapy assessment, plan of care, and goals were reviewed. ASSESSMENT  Patient continues to benefit from skilled OT services and is progressing towards goals. Patient reporting fatigue following increased activity during yesterdays sessions. Patient remains motivated and agreeable despite report of increased fatigue levels, agreeable to transition to recliner chair.  Continues to
Problem: Physical Therapy - Adult  Goal: By Discharge: Performs mobility at highest level of function for planned discharge setting. See evaluation for individualized goals. Description: FUNCTIONAL STATUS PRIOR TO ADMISSION: Pt reports indep with mobility, no use of device, indep with ADLs. Lives with daughter and family who provide support. Acknowledges h/o memory impairment (dementia per chart). Physical Therapy Goals  Initiated 7/8/2023  1. Patient will move from supine to sit and sit to supine in bed with contact guard assist within 7 day(s). 2.  Patient will perform sit to stand with contact guard assist within 7 day(s). 3.  Patient will transfer from bed to chair and chair to bed with contact guard assist using the least restrictive device within 7 day(s). 4.  Patient will ambulate with contact guard assist for 100 feet with the least restrictive device within 7 day(s). Outcome: Progressing     PHYSICAL THERAPY TREATMENT    Patient: Cecilia Townsend (89 y.o. female)  Date: 7/13/2023  Diagnosis: Weakness of right upper extremity [R29.898] Weakness of right upper extremity      Precautions: Fall Risk                    ASSESSMENT:  Patient continues to benefit from skilled PT services and is slowly progressing towards goals. Patient received in bed endorsing fatigue but agreeable to treatment. Patient noted to require overall min A for transfers and gait training with use of RW. Patient requires cues for R quad facilitation when standing and advancing LLE and tactile facilitation of R hand to improve  on RW. Required min A to negotiate AD around obstacles and in tight spaces. Able to stand statically at sink with intermittent support without LOB. Agreeable to further gait training into harris. Improved distances today however continues to require in A with use of RW. Patient agreeable to remain sitting up in chair at end of session.  Discussed therapeutic exercises to perform while sitting in
slow gait pattern in order to improve mechanics, step-to pattern noted to be more stable. Vitals stable pre/post activity, HR slightly tachycardiac with activity (up to 120s). Patient continues to be below functional baseline of independent without DME and would benefit from rehab at discharge. Will continue to follow. Patient Vitals for the past 6 hrs:   Pulse BP Patient Position   07/12/23 1049 (!) 125 134/76 Sitting; Other (Comment)   07/12/23 1040 (!) 120 130/78 Sitting   07/12/23 1036 (!) 103 134/71 Semi fowlers   07/12/23 1004 100 118/68 Semi fowlers   07/12/23 1000 99 -- --   07/12/23 0717 86 (!) 120/59 Supine   07/12/23 0600 81 -- --             PLAN:  Patient continues to benefit from skilled intervention to address the above impairments. Continue treatment per established plan of care. Recommendation for discharge: (in order for the patient to meet his/her long term goals): Therapy 3 hours/day 5-7 days/week    Other factors to consider for discharge: poor safety awareness, high risk for falls, not safe to be alone, and concern for safely navigating or managing the home environment    IF patient discharges home will need the following DME: continuing to assess with progress       SUBJECTIVE:   Patient stated, \"I think I will go get some rehab before going home. \"    OBJECTIVE DATA SUMMARY:       Functional Mobility Training:  Bed Mobility:  Bed Mobility Training  Rolling: Contact-guard assistance  Supine to Sit: Contact-guard assistance  Sit to Supine:  (in recliner)  Scooting: Contact-guard assistance  Transfers:  Transfer Training  Transfer Training: Yes  Sit to Stand: Minimum assistance  Stand to Sit: Minimum assistance  Stand Pivot Transfers: Minimum assistance  Bed to Chair: Minimum assistance  Balance:  Balance  Sitting: Impaired  Sitting - Static: Good (unsupported); Fair (occasional)  Sitting - Dynamic: Fair (occasional)  Standing: Impaired  Standing - Static: Constant support; [place]  Standing -
vcu    Trigger finger, right 02/14/2017    Wedge compression fx of second thoracic vertebra with routine healing 4/25/16    t11     Past Surgical History:   Procedure Laterality Date    COLONOSCOPY      GYN       Prior Level of Function/Home Situation:   Social/Functional History  Lives With: Family (daughter, MARI, adult granddaughter)  Type of Home: House  Home Layout: One level  Home Access: Stairs to enter with rails  Entrance Stairs - Number of Steps: 5  Entrance Stairs - Rails:  (single HR)  Bathroom Shower/Tub: Tub/Shower unit  Has the patient had two or more falls in the past year or any fall with injury in the past year?: No  Receives Help From: Family  ADL Assistance: Independent  Ambulation Assistance: Independent  Transfer Assistance: Independent    Baseline Assessment:                Cognitive and Communication Status:  Neurologic State: Alert      Dysphagia:  Oral Assessment:     P.O. Trials:  PO Trials  Neuromuscular Estim Used: No  Assessment Method(s): Observation  Patient Position: up in chair  Vocal Quality: No Impairment  Consistency Presented: Regular; Thin  How Presented: Self-fed/presented  Bolus Acceptance: No impairment  Bolus Formation/Control: Impaired  Type of Impairment: Delayed  Propulsion: Delayed (# of seconds)  Oral Residue: None  Initiation of Swallow: No impairment  Laryngeal Elevation: Functional  Aspiration Signs/Symptoms: None  Pharyngeal Phase Characteristics: No impairment, issues, or problems               Respiratory Status/Airway:  Room air                After treatment:   Patient left in no apparent distress sitting up in chair and Nursing notified    COMMUNICATION/EDUCATION:     The patient's plan of care including recommendations, planned interventions, and recommended diet changes were discussed with: Registered nurse    Patient/family agree to work toward stated goals and plan of care    Thank you,  Sera Quinn M.S., CCC-SLP  Minutes: 10

## 2023-07-18 ENCOUNTER — HOSPITAL ENCOUNTER (OUTPATIENT)
Facility: HOSPITAL | Age: 88
Setting detail: SPECIMEN
Discharge: HOME | End: 2023-07-21

## 2023-07-18 LAB
ALBUMIN SERPL-MCNC: 2.6 G/DL (ref 3.5–5)
ALBUMIN/GLOB SERPL: 0.7 (ref 1.1–2.2)
ALP SERPL-CCNC: 140 U/L (ref 45–117)
ALT SERPL-CCNC: 39 U/L (ref 12–78)
ANION GAP SERPL CALC-SCNC: 5 MMOL/L (ref 5–15)
AST SERPL W P-5'-P-CCNC: 55 U/L (ref 15–37)
BASOPHILS # BLD: 0 K/UL (ref 0–0.1)
BASOPHILS NFR BLD: 0 % (ref 0–1)
BILIRUB SERPL-MCNC: 0.6 MG/DL (ref 0.2–1)
BUN SERPL-MCNC: 19 MG/DL (ref 6–20)
BUN/CREAT SERPL: 18 (ref 12–20)
CA-I BLD-MCNC: 7.9 MG/DL (ref 8.5–10.1)
CHLORIDE SERPL-SCNC: 106 MMOL/L (ref 97–108)
CO2 SERPL-SCNC: 27 MMOL/L (ref 21–32)
CREAT SERPL-MCNC: 1.07 MG/DL (ref 0.55–1.02)
DIFFERENTIAL METHOD BLD: ABNORMAL
EOSINOPHIL # BLD: 0.4 K/UL (ref 0–0.4)
EOSINOPHIL NFR BLD: 5 % (ref 0–7)
ERYTHROCYTE [DISTWIDTH] IN BLOOD BY AUTOMATED COUNT: 17 % (ref 11.5–14.5)
GLOBULIN SER CALC-MCNC: 4 G/DL (ref 2–4)
GLUCOSE SERPL-MCNC: 120 MG/DL (ref 65–100)
HCT VFR BLD AUTO: 26.3 % (ref 35–47)
HGB BLD-MCNC: 8.2 G/DL (ref 11.5–16)
IMM GRANULOCYTES # BLD AUTO: 0 K/UL (ref 0–0.04)
IMM GRANULOCYTES NFR BLD AUTO: 0 % (ref 0–0.5)
LYMPHOCYTES # BLD: 2.1 K/UL (ref 0.8–3.5)
LYMPHOCYTES NFR BLD: 24 % (ref 12–49)
MCH RBC QN AUTO: 24.6 PG (ref 26–34)
MCHC RBC AUTO-ENTMCNC: 31.2 G/DL (ref 30–36.5)
MCV RBC AUTO: 78.7 FL (ref 80–99)
MONOCYTES # BLD: 0.8 K/UL (ref 0–1)
MONOCYTES NFR BLD: 9 % (ref 5–13)
NEUTS SEG # BLD: 5.3 K/UL (ref 1.8–8)
NEUTS SEG NFR BLD: 62 % (ref 32–75)
NRBC # BLD: 0 K/UL (ref 0–0.01)
NRBC BLD-RTO: 0 PER 100 WBC
PLATELET # BLD AUTO: 524 K/UL (ref 150–400)
PMV BLD AUTO: 13.4 FL (ref 8.9–12.9)
POTASSIUM SERPL-SCNC: 4.6 MMOL/L (ref 3.5–5.1)
PROT SERPL-MCNC: 6.6 G/DL (ref 6.4–8.2)
RBC # BLD AUTO: 3.34 M/UL (ref 3.8–5.2)
SODIUM SERPL-SCNC: 138 MMOL/L (ref 136–145)
WBC # BLD AUTO: 8.6 K/UL (ref 3.6–11)

## 2023-07-18 PROCEDURE — 85025 COMPLETE CBC W/AUTO DIFF WBC: CPT

## 2023-07-18 PROCEDURE — 80053 COMPREHEN METABOLIC PANEL: CPT

## 2023-07-19 ENCOUNTER — HOSPITAL ENCOUNTER (OUTPATIENT)
Facility: HOSPITAL | Age: 88
Setting detail: SPECIMEN
Discharge: HOME | End: 2023-07-22

## 2023-07-19 PROCEDURE — 87040 BLOOD CULTURE FOR BACTERIA: CPT

## 2023-07-21 ENCOUNTER — HOSPITAL ENCOUNTER (OUTPATIENT)
Facility: HOSPITAL | Age: 88
Setting detail: SPECIMEN
Discharge: HOME | End: 2023-07-24

## 2023-07-21 LAB
HCT VFR BLD AUTO: 24 % (ref 35–47)
HGB BLD-MCNC: 7.3 G/DL (ref 11.5–16)

## 2023-07-21 PROCEDURE — 85018 HEMOGLOBIN: CPT

## 2023-07-21 PROCEDURE — 36415 COLL VENOUS BLD VENIPUNCTURE: CPT

## 2023-07-21 PROCEDURE — 85014 HEMATOCRIT: CPT

## 2023-07-23 ENCOUNTER — HOSPITAL ENCOUNTER (OUTPATIENT)
Facility: HOSPITAL | Age: 88
Setting detail: SPECIMEN
Discharge: HOME | End: 2023-07-26

## 2023-07-23 LAB
BACTERIA SPEC CULT: NORMAL
HCT VFR BLD AUTO: 20.6 % (ref 35–47)
HGB BLD-MCNC: 6.3 G/DL (ref 11.5–16)
Lab: NORMAL

## 2023-07-23 PROCEDURE — 85018 HEMOGLOBIN: CPT

## 2023-07-23 PROCEDURE — 85014 HEMATOCRIT: CPT

## 2023-07-24 ENCOUNTER — HOSPITAL ENCOUNTER (OUTPATIENT)
Facility: HOSPITAL | Age: 88
Setting detail: SPECIMEN
Discharge: HOME | End: 2023-07-27

## 2023-07-24 LAB
HCT VFR BLD AUTO: 21.3 % (ref 35–47)
HGB BLD-MCNC: 6.6 G/DL (ref 11.5–16)

## 2023-07-24 PROCEDURE — 85018 HEMOGLOBIN: CPT

## 2023-07-24 PROCEDURE — 85014 HEMATOCRIT: CPT

## 2023-07-25 ENCOUNTER — HOSPITAL ENCOUNTER (OUTPATIENT)
Facility: HOSPITAL | Age: 88
Discharge: HOME | End: 2023-07-28

## 2023-07-25 LAB
HCT VFR BLD AUTO: 19.6 % (ref 35–47)
HGB BLD-MCNC: 6 G/DL (ref 11.5–16)

## 2023-07-25 PROCEDURE — 85014 HEMATOCRIT: CPT

## 2023-07-25 PROCEDURE — 85018 HEMOGLOBIN: CPT

## 2023-07-26 ENCOUNTER — HOSPITAL ENCOUNTER (OUTPATIENT)
Facility: HOSPITAL | Age: 88
Discharge: HOME | End: 2023-07-29

## 2023-07-26 LAB
BACTERIA SPEC CULT: NORMAL
HCT VFR BLD AUTO: 20.1 % (ref 35–47)
HGB BLD-MCNC: 6.4 G/DL (ref 11.5–16)
Lab: NORMAL

## 2023-07-26 PROCEDURE — 85018 HEMOGLOBIN: CPT

## 2023-07-26 PROCEDURE — 85014 HEMATOCRIT: CPT

## 2023-07-28 ENCOUNTER — HOSPITAL ENCOUNTER (OUTPATIENT)
Facility: HOSPITAL | Age: 88
Setting detail: SPECIMEN
Discharge: HOME | End: 2023-07-31

## 2023-07-28 LAB
HCT VFR BLD AUTO: 20.1 % (ref 35–47)
HGB BLD-MCNC: 6.2 G/DL (ref 11.5–16)

## 2023-07-28 PROCEDURE — 85018 HEMOGLOBIN: CPT

## 2023-07-28 PROCEDURE — 85014 HEMATOCRIT: CPT

## 2023-07-30 ENCOUNTER — HOSPITAL ENCOUNTER (OUTPATIENT)
Facility: HOSPITAL | Age: 88
Setting detail: SPECIMEN
Discharge: HOME | End: 2023-08-02

## 2023-07-30 LAB
HCT VFR BLD AUTO: 31.8 % (ref 35–47)
HGB BLD-MCNC: 9.6 G/DL (ref 11.5–16)

## 2023-07-30 PROCEDURE — 85018 HEMOGLOBIN: CPT

## 2023-07-30 PROCEDURE — 85014 HEMATOCRIT: CPT

## 2023-08-01 ENCOUNTER — HOSPITAL ENCOUNTER (OUTPATIENT)
Facility: HOSPITAL | Age: 88
Setting detail: SPECIMEN
Discharge: HOME | End: 2023-08-04

## 2023-08-01 LAB
HCT VFR BLD AUTO: 19 % (ref 35–47)
HGB BLD-MCNC: 6 G/DL (ref 11.5–16)

## 2023-08-01 PROCEDURE — 85014 HEMATOCRIT: CPT

## 2023-08-01 PROCEDURE — 85018 HEMOGLOBIN: CPT

## 2023-08-02 ENCOUNTER — HOSPITAL ENCOUNTER (OUTPATIENT)
Facility: HOSPITAL | Age: 88
Discharge: HOME | End: 2023-08-05
Attending: PHYSICAL MEDICINE & REHABILITATION
Payer: MEDICARE

## 2023-08-02 VITALS
SYSTOLIC BLOOD PRESSURE: 136 MMHG | TEMPERATURE: 98.4 F | HEART RATE: 83 BPM | RESPIRATION RATE: 18 BRPM | OXYGEN SATURATION: 98 % | DIASTOLIC BLOOD PRESSURE: 82 MMHG

## 2023-08-02 DIAGNOSIS — I82.491 DEEP VEIN THROMBOSIS (DVT) OF OTHER VEIN OF RIGHT LOWER EXTREMITY, UNSPECIFIED CHRONICITY (HCC): ICD-10-CM

## 2023-08-02 LAB
ALBUMIN SERPL-MCNC: 2.3 G/DL (ref 3.5–5)
ALBUMIN/GLOB SERPL: 0.6 (ref 1.1–2.2)
ALP SERPL-CCNC: 118 U/L (ref 45–117)
ALT SERPL-CCNC: 47 U/L (ref 12–78)
ANION GAP SERPL CALC-SCNC: 5 MMOL/L (ref 5–15)
APTT PPP: 31.5 SEC (ref 21.2–34.1)
AST SERPL W P-5'-P-CCNC: 55 U/L (ref 15–37)
BILIRUB SERPL-MCNC: 0.4 MG/DL (ref 0.2–1)
BUN SERPL-MCNC: 33 MG/DL (ref 6–20)
BUN/CREAT SERPL: 31 (ref 12–20)
CA-I BLD-MCNC: 8.8 MG/DL (ref 8.5–10.1)
CHLORIDE SERPL-SCNC: 104 MMOL/L (ref 97–108)
CO2 SERPL-SCNC: 32 MMOL/L (ref 21–32)
CREAT SERPL-MCNC: 1.06 MG/DL (ref 0.55–1.02)
ERYTHROCYTE [DISTWIDTH] IN BLOOD BY AUTOMATED COUNT: 22.7 % (ref 11.5–14.5)
GLOBULIN SER CALC-MCNC: 3.7 G/DL (ref 2–4)
GLUCOSE SERPL-MCNC: 94 MG/DL (ref 65–100)
HCT VFR BLD AUTO: 20.6 % (ref 35–47)
HGB BLD-MCNC: 6.4 G/DL (ref 11.5–16)
INR PPP: 1.1 (ref 0.9–1.1)
MCH RBC QN AUTO: 24.8 PG (ref 26–34)
MCHC RBC AUTO-ENTMCNC: 31.1 G/DL (ref 30–36.5)
MCV RBC AUTO: 79.8 FL (ref 80–99)
NRBC # BLD: 0 K/UL (ref 0–0.01)
NRBC BLD-RTO: 0 PER 100 WBC
PLATELET # BLD AUTO: 429 K/UL (ref 150–400)
PMV BLD AUTO: 9.2 FL (ref 8.9–12.9)
POTASSIUM SERPL-SCNC: 4.3 MMOL/L (ref 3.5–5.1)
PROT SERPL-MCNC: 6 G/DL (ref 6.4–8.2)
PROTHROMBIN TIME: 15 SEC (ref 11.9–14.6)
RBC # BLD AUTO: 2.58 M/UL (ref 3.8–5.2)
SODIUM SERPL-SCNC: 141 MMOL/L (ref 136–145)
THERAPEUTIC RANGE: NORMAL SEC (ref 82–109)
WBC # BLD AUTO: 8.8 K/UL (ref 3.6–11)

## 2023-08-02 PROCEDURE — 36415 COLL VENOUS BLD VENIPUNCTURE: CPT

## 2023-08-02 PROCEDURE — 6360000004 HC RX CONTRAST MEDICATION: Performed by: PHYSICAL MEDICINE & REHABILITATION

## 2023-08-02 PROCEDURE — 85730 THROMBOPLASTIN TIME PARTIAL: CPT

## 2023-08-02 PROCEDURE — 85027 COMPLETE CBC AUTOMATED: CPT

## 2023-08-02 PROCEDURE — 85610 PROTHROMBIN TIME: CPT

## 2023-08-02 PROCEDURE — 37191 INS ENDOVAS VENA CAVA FILTR: CPT

## 2023-08-02 PROCEDURE — 80053 COMPREHEN METABOLIC PANEL: CPT

## 2023-08-02 PROCEDURE — 2500000003 HC RX 250 WO HCPCS: Performed by: NURSE PRACTITIONER

## 2023-08-02 RX ORDER — TRAMADOL HYDROCHLORIDE 50 MG/1
25 TABLET ORAL EVERY 4 HOURS
COMMUNITY

## 2023-08-02 RX ORDER — LANOLIN ALCOHOL/MO/W.PET/CERES
3 CREAM (GRAM) TOPICAL NIGHTLY PRN
COMMUNITY

## 2023-08-02 RX ORDER — AMLODIPINE BESYLATE 2.5 MG/1
2.5 TABLET ORAL DAILY
COMMUNITY

## 2023-08-02 RX ORDER — NALOXONE HYDROCHLORIDE 0.4 MG/ML
0.4 INJECTION, SOLUTION INTRAMUSCULAR; INTRAVENOUS; SUBCUTANEOUS PRN
COMMUNITY

## 2023-08-02 RX ORDER — METHOCARBAMOL 500 MG/1
500 TABLET, FILM COATED ORAL EVERY 8 HOURS PRN
COMMUNITY

## 2023-08-02 RX ORDER — HYDRALAZINE HYDROCHLORIDE 25 MG/1
25 TABLET, FILM COATED ORAL EVERY 6 HOURS PRN
COMMUNITY

## 2023-08-02 RX ORDER — LIDOCAINE 50 MG/G
1 PATCH TOPICAL DAILY
COMMUNITY

## 2023-08-02 RX ORDER — TRAZODONE HYDROCHLORIDE 50 MG/1
25 TABLET ORAL NIGHTLY PRN
COMMUNITY

## 2023-08-02 RX ORDER — BISACODYL 10 MG
10 SUPPOSITORY, RECTAL RECTAL AS NEEDED
COMMUNITY

## 2023-08-02 RX ORDER — LEVOTHYROXINE SODIUM 112 UG/1
112 TABLET ORAL
COMMUNITY

## 2023-08-02 RX ORDER — SORBITOL SOLUTION 70 %
30 SOLUTION, ORAL MISCELLANEOUS DAILY PRN
COMMUNITY

## 2023-08-02 RX ORDER — LEVOTHYROXINE SODIUM 0.03 MG/1
25 TABLET ORAL
COMMUNITY

## 2023-08-02 RX ORDER — CALCIUM CARBONATE 500 MG/1
1 TABLET, CHEWABLE ORAL 4 TIMES DAILY PRN
COMMUNITY

## 2023-08-02 RX ORDER — SENNOSIDES A AND B 8.6 MG/1
2 TABLET, FILM COATED ORAL NIGHTLY PRN
COMMUNITY

## 2023-08-02 RX ORDER — ONDANSETRON 4 MG/1
4 TABLET, FILM COATED ORAL EVERY 6 HOURS PRN
COMMUNITY

## 2023-08-02 RX ORDER — LIDOCAINE HYDROCHLORIDE 10 MG/ML
INJECTION, SOLUTION EPIDURAL; INFILTRATION; INTRACAUDAL; PERINEURAL PRN
Status: COMPLETED | OUTPATIENT
Start: 2023-08-02 | End: 2023-08-02

## 2023-08-02 RX ORDER — SUCRALFATE 1 G/1
1 TABLET ORAL 4 TIMES DAILY
COMMUNITY

## 2023-08-02 RX ORDER — SIMETHICONE 80 MG
80 TABLET,CHEWABLE ORAL 4 TIMES DAILY PRN
COMMUNITY

## 2023-08-02 RX ORDER — SODIUM CHLORIDE 9 MG/ML
INJECTION, SOLUTION INTRAVENOUS CONTINUOUS
Status: DISCONTINUED | OUTPATIENT
Start: 2023-08-02 | End: 2023-08-06 | Stop reason: HOSPADM

## 2023-08-02 RX ADMIN — LIDOCAINE HYDROCHLORIDE 5 ML: 10 INJECTION, SOLUTION EPIDURAL; INFILTRATION; INTRACAUDAL; PERINEURAL at 09:54

## 2023-08-02 RX ADMIN — IOPAMIDOL 20 ML: 755 INJECTION, SOLUTION INTRAVENOUS at 10:51

## 2023-08-02 ASSESSMENT — PAIN SCALES - GENERAL
PAINLEVEL_OUTOF10: 0
PAINLEVEL_OUTOF10: 0

## 2023-08-02 ASSESSMENT — PAIN - FUNCTIONAL ASSESSMENT: PAIN_FUNCTIONAL_ASSESSMENT: NONE - DENIES PAIN

## 2023-08-02 NOTE — PROGRESS NOTES
REPORT CALLED TO GENARO AGUILERA AT [facility] , BANDAID TO NECK DRY AND INTACT , PICC LINE INTACT TO  LEFT ARM , PT'S DAUGHTER NTFD THAT PT BACK IN SDS , NO DISTRESS NOTED

## 2023-08-02 NOTE — PERIOP NOTE
Spoke to Paige hamm nurse from [facility], asked about patients last dose of eliquis and aspirin.  Nurse stated that they do not have that medication on the patients list.

## 2023-08-13 ENCOUNTER — APPOINTMENT (OUTPATIENT)
Facility: HOSPITAL | Age: 88
End: 2023-08-13
Attending: STUDENT IN AN ORGANIZED HEALTH CARE EDUCATION/TRAINING PROGRAM
Payer: MEDICARE

## 2023-08-13 ENCOUNTER — HOSPITAL ENCOUNTER (EMERGENCY)
Facility: HOSPITAL | Age: 88
Discharge: HOME | End: 2023-08-13
Attending: STUDENT IN AN ORGANIZED HEALTH CARE EDUCATION/TRAINING PROGRAM
Payer: MEDICARE

## 2023-08-13 VITALS
HEART RATE: 90 BPM | TEMPERATURE: 99 F | BODY MASS INDEX: 20.91 KG/M2 | OXYGEN SATURATION: 99 % | DIASTOLIC BLOOD PRESSURE: 75 MMHG | RESPIRATION RATE: 16 BRPM | WEIGHT: 118 LBS | SYSTOLIC BLOOD PRESSURE: 131 MMHG | HEIGHT: 63 IN

## 2023-08-13 DIAGNOSIS — I82.501 CHRONIC DEEP VEIN THROMBOSIS (DVT) OF RIGHT LOWER EXTREMITY, UNSPECIFIED VEIN (HCC): Primary | ICD-10-CM

## 2023-08-13 PROCEDURE — 99284 EMERGENCY DEPT VISIT MOD MDM: CPT

## 2023-08-13 PROCEDURE — 93971 EXTREMITY STUDY: CPT | Performed by: SURGERY

## 2023-08-13 PROCEDURE — 93971 EXTREMITY STUDY: CPT

## 2023-08-13 ASSESSMENT — PAIN SCALES - GENERAL: PAINLEVEL_OUTOF10: 10

## 2023-08-13 ASSESSMENT — PAIN - FUNCTIONAL ASSESSMENT: PAIN_FUNCTIONAL_ASSESSMENT: 0-10

## 2023-08-13 NOTE — ED NOTES
Spoke with Rivka Ibarra, let her know pt is up for DC. She states she will come get her by 3 pm today.       Chauncey Espino RN  08/13/23 1452

## 2023-08-13 NOTE — ED TRIAGE NOTES
Having R lower leg pain into her foot with tenderness and associated swelling    Was just [facility] and hx of blood clots no redness

## 2023-08-14 LAB — ECHO BSA: 1.54 M2

## 2023-08-17 ENCOUNTER — HOSPITAL ENCOUNTER (EMERGENCY)
Facility: HOSPITAL | Age: 88
Discharge: HOME | End: 2023-08-17
Attending: STUDENT IN AN ORGANIZED HEALTH CARE EDUCATION/TRAINING PROGRAM
Payer: MEDICARE

## 2023-08-17 ENCOUNTER — APPOINTMENT (OUTPATIENT)
Facility: HOSPITAL | Age: 88
End: 2023-08-17
Attending: STUDENT IN AN ORGANIZED HEALTH CARE EDUCATION/TRAINING PROGRAM
Payer: MEDICARE

## 2023-08-17 VITALS
HEIGHT: 63 IN | HEART RATE: 96 BPM | OXYGEN SATURATION: 100 % | RESPIRATION RATE: 16 BRPM | DIASTOLIC BLOOD PRESSURE: 86 MMHG | TEMPERATURE: 98.5 F | WEIGHT: 118 LBS | SYSTOLIC BLOOD PRESSURE: 134 MMHG | BODY MASS INDEX: 20.91 KG/M2

## 2023-08-17 DIAGNOSIS — I82.501 CHRONIC DEEP VEIN THROMBOSIS (DVT) OF RIGHT LOWER EXTREMITY, UNSPECIFIED VEIN (HCC): Primary | ICD-10-CM

## 2023-08-17 LAB — ECHO BSA: 1.54 M2

## 2023-08-17 PROCEDURE — 6370000000 HC RX 637 (ALT 250 FOR IP): Performed by: STUDENT IN AN ORGANIZED HEALTH CARE EDUCATION/TRAINING PROGRAM

## 2023-08-17 PROCEDURE — 93971 EXTREMITY STUDY: CPT

## 2023-08-17 PROCEDURE — 6360000002 HC RX W HCPCS: Performed by: STUDENT IN AN ORGANIZED HEALTH CARE EDUCATION/TRAINING PROGRAM

## 2023-08-17 PROCEDURE — 99284 EMERGENCY DEPT VISIT MOD MDM: CPT

## 2023-08-17 PROCEDURE — 96372 THER/PROPH/DIAG INJ SC/IM: CPT

## 2023-08-17 RX ORDER — ACETAMINOPHEN 325 MG/1
650 TABLET ORAL
Status: COMPLETED | OUTPATIENT
Start: 2023-08-17 | End: 2023-08-17

## 2023-08-17 RX ORDER — KETOROLAC TROMETHAMINE 15 MG/ML
15 INJECTION, SOLUTION INTRAMUSCULAR; INTRAVENOUS ONCE
Status: COMPLETED | OUTPATIENT
Start: 2023-08-17 | End: 2023-08-17

## 2023-08-17 RX ADMIN — KETOROLAC TROMETHAMINE 15 MG: 15 INJECTION, SOLUTION INTRAMUSCULAR; INTRAVENOUS at 18:36

## 2023-08-17 RX ADMIN — ACETAMINOPHEN 650 MG: 325 TABLET ORAL at 18:37

## 2023-08-17 NOTE — ED PROVIDER NOTES
times daily for 4 days, THEN 1 tablet 2 times daily. Start taking on: July 14, 2023     aspirin 81 MG EC tablet  Take 1 tablet by mouth daily     atorvastatin 80 MG tablet  Commonly known as: LIPITOR  Take 1 tablet by mouth every evening     bisacodyl 10 MG suppository  Commonly known as: DULCOLAX     calcium carbonate 500 MG chewable tablet  Commonly known as: TUMS     diclofenac sodium 1 % Gel  Commonly known as: VOLTAREN     docusate sodium 283 MG enema  Commonly known as: ENEMEEZ     ferrous sulfate 325 (65 Fe) MG tablet  Commonly known as: IRON 325     hydrALAZINE 25 MG tablet  Commonly known as: APRESOLINE     * levothyroxine 25 MCG tablet  Commonly known as: SYNTHROID     * levothyroxine 112 MCG tablet  Commonly known as: SYNTHROID     * levothyroxine 100 MCG tablet  Commonly known as: SYNTHROID     lidocaine 5 %  Commonly known as: LIDODERM     melatonin 3 MG Tabs tablet     methocarbamol 500 MG tablet  Commonly known as: ROBAXIN     mirtazapine 45 MG tablet  Commonly known as: REMERON     naloxone 0.4 MG/ML injection  Commonly known as: NARCAN     ondansetron 4 MG tablet  Commonly known as: ZOFRAN     pantoprazole 40 MG tablet  Commonly known as: PROTONIX     polyethylene glycol 17 GM/SCOOP powder  Commonly known as: GLYCOLAX     senna 8.6 MG tablet  Commonly known as: SENOKOT     simethicone 80 MG chewable tablet  Commonly known as: MYLICON     sorbitol 70 % solution     sucralfate 1 GM tablet  Commonly known as: CARAFATE     traMADol 50 MG tablet  Commonly known as: ULTRAM     traZODone 50 MG tablet  Commonly known as: DESYREL     Trelegy Ellipta 200-62.5-25 MCG/ACT Aepb inhaler  Generic drug: fluticasone-umeclidin-vilant           * This list has 3 medication(s) that are the same as other medications prescribed for you. Read the directions carefully, and ask your doctor or other care provider to review them with you.                     DISCONTINUED MEDICATIONS:  Discharge Medication List as of 8/13/2023

## 2023-08-17 NOTE — DISCHARGE INSTRUCTIONS
Thank you! Thank you for allowing me to care for you in the emergency department. It is my goal to provide you with excellent care. If you have not received excellent quality care, please ask to speak to the nurse manager. Please fill out the survey that will come to you by mail or email since we listen to your feedback! Below you will find a list of your tests from today's visit. Should you have any questions, please do not hesitate to call the emergency department. Labs  No results found for this or any previous visit (from the past 12 hour(s)). Radiologic Studies  Vascular duplex lower extremity venous right    (Results Pending)     ------------------------------------------------------------------------------------------------------------  The exam and treatment you received in the Emergency Department were for an urgent problem and are not intended as complete care. It is important that you follow-up with a doctor, nurse practitioner, or physician assistant to:  (1) confirm your diagnosis,  (2) re-evaluation of changes in your illness and treatment, and  (3) for ongoing care. Please take your discharge instructions with you when you go to your follow-up appointment. If you have any problem arranging a follow-up appointment, contact the Emergency Department. If your symptoms become worse or you do not improve as expected and you are unable to reach your health care provider, please return to the Emergency Department. We are available 24 hours a day. If a prescription has been provided, please have it filled as soon as possible to prevent a delay in treatment. If you have any questions or reservations about taking the medication due to side effects or interactions with other medications, please call your primary care provider or contact the ER.

## 2023-08-17 NOTE — ED TRIAGE NOTES
DVT to right leg. On blood thinners, Increased pain.  Seen last week duplex showed no changes to DVT

## 2023-08-17 NOTE — ED PROVIDER NOTES
[facility] EMERGENCY DEPT  EMERGENCY DEPARTMENT HISTORY AND PHYSICAL EXAM      Date: 2023  Patient Name: Kavin Machado  MRN: [id number]  [address] [facility] 1933  Date of evaluation: 2023  Provider: Grayson Thibodeaux DO   Note Started: 6:49 PM EDT 23    HISTORY OF PRESENT ILLNESS     Chief Complaint   Patient presents with    Leg Pain       History Provided By: Patient    HPI: Kavin Machado is a 80 y.o. female with history as reviewed as below who presents to the emergency department due to acute on chronic right lower extremity pain that is been ongoing for the past several months. Denies any new injury or trauma. Patient was previously diagnosed with a right lower extremity DVT, placed on Eliquis however, taken off eliquis with IVC placement while admitted to [facility] term [facility]. Patient was seen here recently for similar pains, and repeat duplex that showed unchanged DVT and discharged home. Patient returns today complaining of similar pain. Denies any fevers or vomiting. Denies any chest pain or shortness of breath. No other symptoms or complaints.     PAST MEDICAL HISTORY   Past Medical History:  Past Medical History:   Diagnosis Date    Asthma     Bereavement 3/30/16    63 yo con  - multiple myleoma     Cholelithiasis     Cough     Dyslipidemia     Hip pain, chronic, left 2017    Hypertension     Low back pain 16    Osteoarthritis     Osteoporosis 16    Prediabetes     S/P colonoscopy 7/15    vcu    Trigger finger, right 2017    Wedge compression fx of second thoracic vertebra with routine healing 16    t11       Past Surgical History:  Past Surgical History:   Procedure Laterality Date    COLONOSCOPY      GYN      IR IVC FILTER PLACEMENT W IMAGING  2023    IR IVC FILTER PLACEMENT W IMAGING 2023 Hector Johnson, APRN - NP SSR RAD ANGIO IR       Family History:  Family History   Problem Relation Age of Onset    Cancer Son        Social

## 2023-08-26 PROBLEM — I82.501 CHRONIC DEEP VEIN THROMBOSIS (DVT) OF RIGHT LOWER EXTREMITY (HCC): Status: ACTIVE | Noted: 2023-08-26

## 2023-11-03 ENCOUNTER — TELEPHONE (OUTPATIENT)
Age: 88
End: 2023-11-03

## 2023-11-03 NOTE — TELEPHONE ENCOUNTER
Pt was seen in the hospital 7/2023 for stroke. BAYRON Owens sent a message for us to schedule a hospital f/u with her. Called pt, but her voice mail-box was full, so I called the pt's daughter and LVM with our number.

## 2023-11-07 ENCOUNTER — APPOINTMENT (OUTPATIENT)
Facility: HOSPITAL | Age: 88
DRG: 853 | End: 2023-11-07
Payer: MEDICARE

## 2023-11-07 ENCOUNTER — HOSPITAL ENCOUNTER (INPATIENT)
Facility: HOSPITAL | Age: 88
LOS: 24 days | Discharge: NURSING, REHAB OR LONG-TERM CARE | DRG: 853 | End: 2023-12-01
Attending: STUDENT IN AN ORGANIZED HEALTH CARE EDUCATION/TRAINING PROGRAM | Admitting: INTERNAL MEDICINE
Payer: MEDICARE

## 2023-11-07 DIAGNOSIS — R52 PAIN: ICD-10-CM

## 2023-11-07 DIAGNOSIS — N17.9 AKI (ACUTE KIDNEY INJURY) (HCC): ICD-10-CM

## 2023-11-07 DIAGNOSIS — A41.9 SEPSIS, DUE TO UNSPECIFIED ORGANISM, UNSPECIFIED WHETHER ACUTE ORGAN DYSFUNCTION PRESENT (HCC): Primary | ICD-10-CM

## 2023-11-07 DIAGNOSIS — D72.829 LEUKOCYTOSIS, UNSPECIFIED TYPE: ICD-10-CM

## 2023-11-07 DIAGNOSIS — R79.89 TROPONIN LEVEL ELEVATED: ICD-10-CM

## 2023-11-07 LAB
ANION GAP BLD CALC-SCNC: 10
ANION GAP SERPL CALC-SCNC: 9 MMOL/L (ref 5–15)
APPEARANCE UR: CLEAR
BACTERIA URNS QL MICRO: NEGATIVE /HPF
BASE DEFICIT BLD-SCNC: 4.9 MMOL/L
BASOPHILS # BLD: 0 K/UL (ref 0–0.1)
BASOPHILS NFR BLD: 0 % (ref 0–1)
BILIRUB UR QL: NEGATIVE
BUN SERPL-MCNC: 83 MG/DL (ref 6–20)
BUN/CREAT SERPL: 38 (ref 12–20)
CA-I BLD-MCNC: 1.02 MMOL/L (ref 1.12–1.32)
CA-I BLD-MCNC: 1.06 MMOL/L (ref 1.12–1.32)
CA-I BLD-MCNC: 8.8 MG/DL (ref 8.5–10.1)
CHLORIDE BLD-SCNC: 122 MMOL/L (ref 98–107)
CHLORIDE BLD-SCNC: 127 MMOL/L (ref 98–107)
CHLORIDE SERPL-SCNC: 117 MMOL/L (ref 97–108)
CO2 BLD-SCNC: 18 MMOL/L
CO2 BLD-SCNC: 22 MMOL/L
CO2 SERPL-SCNC: 23 MMOL/L (ref 21–32)
COLOR UR: NORMAL
CREAT SERPL-MCNC: 2.17 MG/DL (ref 0.55–1.02)
CREAT UR-MCNC: 1.49 MG/DL (ref 0.6–1.3)
CREAT UR-MCNC: 1.8 MG/DL (ref 0.6–1.3)
DIFFERENTIAL METHOD BLD: ABNORMAL
EOSINOPHIL # BLD: 0 K/UL (ref 0–0.4)
EOSINOPHIL NFR BLD: 0 % (ref 0–7)
EPITH CASTS URNS QL MICRO: NORMAL /LPF
ERYTHROCYTE [DISTWIDTH] IN BLOOD BY AUTOMATED COUNT: 21.2 % (ref 11.5–14.5)
GLUCOSE BLD STRIP.AUTO-MCNC: 113 MG/DL (ref 65–100)
GLUCOSE BLD STRIP.AUTO-MCNC: 115 MG/DL (ref 65–100)
GLUCOSE SERPL-MCNC: 118 MG/DL (ref 65–100)
GLUCOSE UR STRIP.AUTO-MCNC: NEGATIVE MG/DL
HCO3 BLD-SCNC: 17.9 MMOL/L (ref 19–28)
HCT VFR BLD AUTO: 32.4 % (ref 35–47)
HGB BLD-MCNC: 9.8 G/DL (ref 11.5–16)
HGB UR QL STRIP: NEGATIVE
HYALINE CASTS URNS QL MICRO: NORMAL /LPF (ref 0–5)
IMM GRANULOCYTES # BLD AUTO: 0 K/UL (ref 0–0.04)
IMM GRANULOCYTES NFR BLD AUTO: 0 % (ref 0–0.5)
KETONES UR QL STRIP.AUTO: NEGATIVE MG/DL
LACTATE BLD-SCNC: 0.8 MMOL/L (ref 0.4–2)
LACTATE BLD-SCNC: 2.77 MMOL/L (ref 0.4–2)
LACTATE SERPL-SCNC: 2.6 MMOL/L (ref 0.4–2)
LEUKOCYTE ESTERASE UR QL STRIP.AUTO: NEGATIVE
LYMPHOCYTES # BLD: 1.1 K/UL (ref 0.8–3.5)
LYMPHOCYTES NFR BLD: 3 % (ref 12–49)
MCH RBC QN AUTO: 26.4 PG (ref 26–34)
MCHC RBC AUTO-ENTMCNC: 30.2 G/DL (ref 30–36.5)
MCV RBC AUTO: 87.3 FL (ref 80–99)
MONOCYTES # BLD: 1.4 K/UL (ref 0–1)
MONOCYTES NFR BLD: 4 % (ref 5–13)
MUCOUS THREADS URNS QL MICRO: NEGATIVE /LPF
NEUTS SEG # BLD: 32.6 K/UL (ref 1.8–8)
NEUTS SEG NFR BLD: 93 % (ref 32–75)
NITRITE UR QL STRIP.AUTO: NEGATIVE
NRBC # BLD: 0.02 K/UL (ref 0–0.01)
NRBC BLD-RTO: 0.1 PER 100 WBC
PCO2 BLD: 25.7 MMHG (ref 35–45)
PERFORMED BY:: ABNORMAL
PERFORMED BY:: ABNORMAL
PH BLD: 7.45 (ref 7.35–7.45)
PH UR STRIP: 5 (ref 5–8)
PLATELET # BLD AUTO: 530 K/UL (ref 150–400)
PO2 BLD: 115 MMHG (ref 75–100)
POTASSIUM BLD-SCNC: 3.4 MMOL/L (ref 3.5–5.5)
POTASSIUM BLD-SCNC: 4 MMOL/L (ref 3.5–5.5)
POTASSIUM SERPL-SCNC: 4.6 MMOL/L (ref 3.5–5.1)
PROCALCITONIN SERPL-MCNC: 1.86 NG/ML
PROT UR STRIP-MCNC: NEGATIVE MG/DL
RBC # BLD AUTO: 3.71 M/UL (ref 3.8–5.2)
RBC #/AREA URNS HPF: NORMAL /HPF (ref 0–5)
RBC MORPH BLD: ABNORMAL
SAO2 % BLD: 99 %
SERVICE CMNT-IMP: ABNORMAL
SODIUM BLD-SCNC: 153 MMOL/L (ref 136–145)
SODIUM BLD-SCNC: 155 MMOL/L (ref 136–145)
SODIUM SERPL-SCNC: 149 MMOL/L (ref 136–145)
SP GR UR REFRACTOMETRY: 1.01 (ref 1–1.03)
SPECIMEN SITE: ABNORMAL
SPECIMEN SITE: ABNORMAL
TROPONIN I SERPL HS-MCNC: 38 NG/L (ref 0–51)
URINE CULTURE IF INDICATED: NORMAL
UROBILINOGEN UR QL STRIP.AUTO: 0.1 EU/DL (ref 0.1–1)
WBC # BLD AUTO: 35.1 K/UL (ref 3.6–11)
WBC URNS QL MICRO: NORMAL /HPF (ref 0–4)

## 2023-11-07 PROCEDURE — 6360000002 HC RX W HCPCS: Performed by: STUDENT IN AN ORGANIZED HEALTH CARE EDUCATION/TRAINING PROGRAM

## 2023-11-07 PROCEDURE — 84484 ASSAY OF TROPONIN QUANT: CPT

## 2023-11-07 PROCEDURE — 87040 BLOOD CULTURE FOR BACTERIA: CPT

## 2023-11-07 PROCEDURE — 2580000003 HC RX 258: Performed by: STUDENT IN AN ORGANIZED HEALTH CARE EDUCATION/TRAINING PROGRAM

## 2023-11-07 PROCEDURE — 99285 EMERGENCY DEPT VISIT HI MDM: CPT

## 2023-11-07 PROCEDURE — 93005 ELECTROCARDIOGRAM TRACING: CPT | Performed by: STUDENT IN AN ORGANIZED HEALTH CARE EDUCATION/TRAINING PROGRAM

## 2023-11-07 PROCEDURE — 1100000000 HC RM PRIVATE

## 2023-11-07 PROCEDURE — 71045 X-RAY EXAM CHEST 1 VIEW: CPT

## 2023-11-07 PROCEDURE — 82803 BLOOD GASES ANY COMBINATION: CPT

## 2023-11-07 PROCEDURE — 36415 COLL VENOUS BLD VENIPUNCTURE: CPT

## 2023-11-07 PROCEDURE — 84132 ASSAY OF SERUM POTASSIUM: CPT

## 2023-11-07 PROCEDURE — 84145 PROCALCITONIN (PCT): CPT

## 2023-11-07 PROCEDURE — 82330 ASSAY OF CALCIUM: CPT

## 2023-11-07 PROCEDURE — 96365 THER/PROPH/DIAG IV INF INIT: CPT

## 2023-11-07 PROCEDURE — 82947 ASSAY GLUCOSE BLOOD QUANT: CPT

## 2023-11-07 PROCEDURE — 96367 TX/PROPH/DG ADDL SEQ IV INF: CPT

## 2023-11-07 PROCEDURE — 81001 URINALYSIS AUTO W/SCOPE: CPT

## 2023-11-07 PROCEDURE — 84295 ASSAY OF SERUM SODIUM: CPT

## 2023-11-07 PROCEDURE — 80047 BASIC METABLC PNL IONIZED CA: CPT

## 2023-11-07 PROCEDURE — 80048 BASIC METABOLIC PNL TOTAL CA: CPT

## 2023-11-07 PROCEDURE — 85025 COMPLETE CBC W/AUTO DIFF WBC: CPT

## 2023-11-07 PROCEDURE — 83605 ASSAY OF LACTIC ACID: CPT

## 2023-11-07 RX ORDER — SODIUM CHLORIDE 9 MG/ML
INJECTION, SOLUTION INTRAVENOUS CONTINUOUS
Status: DISCONTINUED | OUTPATIENT
Start: 2023-11-07 | End: 2023-11-07

## 2023-11-07 RX ORDER — 0.9 % SODIUM CHLORIDE 0.9 %
30 INTRAVENOUS SOLUTION INTRAVENOUS ONCE
Status: COMPLETED | OUTPATIENT
Start: 2023-11-07 | End: 2023-11-07

## 2023-11-07 RX ADMIN — CEFEPIME 2000 MG: 2 INJECTION, POWDER, FOR SOLUTION INTRAVENOUS at 19:37

## 2023-11-07 RX ADMIN — SODIUM CHLORIDE 1632 ML: 9 INJECTION, SOLUTION INTRAVENOUS at 19:00

## 2023-11-07 RX ADMIN — VANCOMYCIN HYDROCHLORIDE 1250 MG: 1.25 INJECTION, POWDER, LYOPHILIZED, FOR SOLUTION INTRAVENOUS at 20:28

## 2023-11-07 ASSESSMENT — PAIN - FUNCTIONAL ASSESSMENT: PAIN_FUNCTIONAL_ASSESSMENT: NONE - DENIES PAIN

## 2023-11-07 NOTE — ED TRIAGE NOTES
Pt arrives to ED by EMS from home. Home health nurse called for hypotension. EMS arrived on scene and found Pt to have normal BP and normal vitals on scene. Pt at baseline.

## 2023-11-08 LAB
ANION GAP SERPL CALC-SCNC: 8 MMOL/L (ref 5–15)
BUN SERPL-MCNC: 74 MG/DL (ref 6–20)
BUN/CREAT SERPL: 40 (ref 12–20)
CA-I BLD-MCNC: 8.3 MG/DL (ref 8.5–10.1)
CHLORIDE SERPL-SCNC: 120 MMOL/L (ref 97–108)
CO2 SERPL-SCNC: 19 MMOL/L (ref 21–32)
CREAT SERPL-MCNC: 1.84 MG/DL (ref 0.55–1.02)
GLUCOSE SERPL-MCNC: 195 MG/DL (ref 65–100)
POTASSIUM SERPL-SCNC: 3.4 MMOL/L (ref 3.5–5.1)
SODIUM SERPL-SCNC: 147 MMOL/L (ref 136–145)
VANCOMYCIN SERPL-MCNC: 15 UG/ML

## 2023-11-08 PROCEDURE — 80048 BASIC METABOLIC PNL TOTAL CA: CPT

## 2023-11-08 PROCEDURE — 2580000003 HC RX 258: Performed by: INTERNAL MEDICINE

## 2023-11-08 PROCEDURE — 92610 EVALUATE SWALLOWING FUNCTION: CPT

## 2023-11-08 PROCEDURE — 84300 ASSAY OF URINE SODIUM: CPT

## 2023-11-08 PROCEDURE — 6360000002 HC RX W HCPCS: Performed by: INTERNAL MEDICINE

## 2023-11-08 PROCEDURE — 36415 COLL VENOUS BLD VENIPUNCTURE: CPT

## 2023-11-08 PROCEDURE — 1100000000 HC RM PRIVATE

## 2023-11-08 PROCEDURE — 80202 ASSAY OF VANCOMYCIN: CPT

## 2023-11-08 PROCEDURE — 82570 ASSAY OF URINE CREATININE: CPT

## 2023-11-08 RX ORDER — ACETAMINOPHEN 325 MG/1
650 TABLET ORAL EVERY 6 HOURS PRN
Status: DISCONTINUED | OUTPATIENT
Start: 2023-11-08 | End: 2023-12-01 | Stop reason: HOSPADM

## 2023-11-08 RX ORDER — ONDANSETRON 2 MG/ML
4 INJECTION INTRAMUSCULAR; INTRAVENOUS EVERY 6 HOURS PRN
Status: DISCONTINUED | OUTPATIENT
Start: 2023-11-08 | End: 2023-12-01 | Stop reason: HOSPADM

## 2023-11-08 RX ORDER — ACETAMINOPHEN 650 MG/1
650 SUPPOSITORY RECTAL EVERY 6 HOURS PRN
Status: DISCONTINUED | OUTPATIENT
Start: 2023-11-08 | End: 2023-12-01 | Stop reason: HOSPADM

## 2023-11-08 RX ORDER — SODIUM CHLORIDE 0.9 % (FLUSH) 0.9 %
5-40 SYRINGE (ML) INJECTION EVERY 12 HOURS SCHEDULED
Status: DISCONTINUED | OUTPATIENT
Start: 2023-11-08 | End: 2023-11-25

## 2023-11-08 RX ORDER — DEXTROSE AND SODIUM CHLORIDE 5; .45 G/100ML; G/100ML
INJECTION, SOLUTION INTRAVENOUS CONTINUOUS
Status: DISCONTINUED | OUTPATIENT
Start: 2023-11-08 | End: 2023-11-09

## 2023-11-08 RX ORDER — ENOXAPARIN SODIUM 100 MG/ML
30 INJECTION SUBCUTANEOUS DAILY
Status: DISCONTINUED | OUTPATIENT
Start: 2023-11-08 | End: 2023-11-14

## 2023-11-08 RX ORDER — HYDROMORPHONE HYDROCHLORIDE 1 MG/ML
0.25 INJECTION, SOLUTION INTRAMUSCULAR; INTRAVENOUS; SUBCUTANEOUS EVERY 4 HOURS PRN
Status: DISPENSED | OUTPATIENT
Start: 2023-11-08 | End: 2023-11-13

## 2023-11-08 RX ORDER — SODIUM CHLORIDE 0.9 % (FLUSH) 0.9 %
5-40 SYRINGE (ML) INJECTION PRN
Status: DISCONTINUED | OUTPATIENT
Start: 2023-11-08 | End: 2023-12-01 | Stop reason: HOSPADM

## 2023-11-08 RX ORDER — ONDANSETRON 4 MG/1
4 TABLET, ORALLY DISINTEGRATING ORAL EVERY 8 HOURS PRN
Status: DISCONTINUED | OUTPATIENT
Start: 2023-11-08 | End: 2023-12-01 | Stop reason: HOSPADM

## 2023-11-08 RX ORDER — SODIUM CHLORIDE 9 MG/ML
INJECTION, SOLUTION INTRAVENOUS PRN
Status: DISCONTINUED | OUTPATIENT
Start: 2023-11-08 | End: 2023-12-01 | Stop reason: HOSPADM

## 2023-11-08 RX ADMIN — PIPERACILLIN AND TAZOBACTAM 3375 MG: 3; .375 INJECTION, POWDER, LYOPHILIZED, FOR SOLUTION INTRAVENOUS at 18:03

## 2023-11-08 RX ADMIN — SODIUM CHLORIDE, PRESERVATIVE FREE 10 ML: 5 INJECTION INTRAVENOUS at 21:08

## 2023-11-08 RX ADMIN — ENOXAPARIN SODIUM 30 MG: 100 INJECTION SUBCUTANEOUS at 09:03

## 2023-11-08 RX ADMIN — SODIUM CHLORIDE, PRESERVATIVE FREE 10 ML: 5 INJECTION INTRAVENOUS at 08:58

## 2023-11-08 RX ADMIN — DEXTROSE AND SODIUM CHLORIDE: 5; 450 INJECTION, SOLUTION INTRAVENOUS at 08:57

## 2023-11-08 RX ADMIN — DEXTROSE AND SODIUM CHLORIDE: 5; 450 INJECTION, SOLUTION INTRAVENOUS at 16:56

## 2023-11-08 RX ADMIN — PIPERACILLIN AND TAZOBACTAM 4500 MG: 4; .5 INJECTION, POWDER, LYOPHILIZED, FOR SOLUTION INTRAVENOUS at 09:01

## 2023-11-08 NOTE — ED NOTES
Report tubed to 5W at this time.  Sakshi aware that it is on its way     Sharla Closs, RN  11/07/23 8729

## 2023-11-08 NOTE — CARE COORDINATION
11/08/23 1449   Service Assessment   Patient Orientation Self   Cognition Dementia / Early Alzheimer's   History Provided By Child/Family   Primary [facility] Children;Family Members   Patient's Healthcare Decision Maker is: Legal Next of [facility]   PCP Verified by CM Yes   Last Visit to PCP Within last year   Prior Functional Level Assistance with the following:;Bathing;Dressing; Toileting;Housework;Feeding;Cooking; Shopping;Mobility   Current Functional Level Assistance with the following:;Bathing;Dressing; Toileting;Feeding;Cooking;Housework; Shopping;Mobility   Can patient return to prior living arrangement Yes   Ability to make needs known: Fair   Family able to assist with home care needs: Yes   Would you like for me to discuss the discharge plan with any other family members/significant others, and if so, who? Yes  (Daughter, Elle Gutierrez)   Financial Resources Medicare   Community Resources None   Social/Functional History   Lives With Family;Daughter   Type of [address] [place] [address], [address], [address] Needs assistance   Ambulation Assistance Non-ambulatory   Transfer Assistance Needs assistance   Discharge Planning   Type of Residence House   Living Arrangements Children;Family Members   Current Services Prior To Admission Durable Medical Equipment   Current DME Prior to [facility]   DME Ordered? No   Type of Home Care Services None   Patient expects to be discharged to: Unknown   Services At/After Discharge   Transition of Care Consult (CM Consult) N/A   Services At/After Discharge None   Mode of Transport at Discharge [address]     Patient lives at home with her daughter and her daughter's family. Patient has a walker, wheelchair and hospital bed at home. Patient's family provides all of her care. Patient is active with [facility].   Choice letter signed and

## 2023-11-08 NOTE — ED NOTES
Assumed care of patient. Bedside shift report received from SHELBIE, [address] at this time. Pt resting quietly at this time without complaint.       Ibis Hou RN  11/07/23 2040

## 2023-11-08 NOTE — H&P
Department of Internal Medicine  General Internal Medicine  Attending History and Physical    Altered mental status with multiple ulcers    Reason for Admission: Sepsis, acute kidney injury, multiple infected skin ulcers malnutrition severe metabolic encephalopathy    History Obtained From:  family member - daughter    HISTORY OF PRESENT ILLNESS:      The patient is a 80 y.o. female with significant past medical history of asthma, hypertension, who presents with functional decline and altered mental status of 1 weeks duration with poor intake and multiple ulcers in the emergency department creatinine was elevated patient was started on IV vancomycin    Past Medical History:        Diagnosis Date    Asthma     Bereavement 3/30/16    63 yo con  - multiple myleoma     Cholelithiasis     Cough     Dyslipidemia     Hip pain, chronic, left 2017    Hypertension     Low back pain 16    Osteoarthritis     Osteoporosis 16    Prediabetes     S/P colonoscopy 7/15    vcu    Trigger finger, right 2017    Wedge compression fx of second thoracic vertebra with routine healing 16    t11     Past Surgical History:        Procedure Laterality Date    COLONOSCOPY      GYN      IR IVC FILTER PLACEMENT W IMAGING  2023    IR IVC FILTER PLACEMENT W IMAGING 2023 Fernande Severance., APRN - NP SSR RAD ANGIO IR     Immunizations:                Influenza:   Indicated for current flu vaccination season Oct. to Feb.            Pneumococcal Polysaccharide:  Indicated for current flu vaccination season Oct. to Feb.    Medications Prior to Admission:    Medications Prior to Admission: amLODIPine (NORVASC) 2.5 MG tablet, Take 1 tablet by mouth daily  docusate sodium (ENEMEEZ) 283 MG enema, Place 5 mLs rectally as needed for Constipation  bisacodyl (DULCOLAX) 10 MG suppository, Place 1 suppository rectally as needed for Constipation  calcium carbonate (TUMS) 500 MG chewable tablet, Take 1 tablet by mouth 4

## 2023-11-09 ENCOUNTER — APPOINTMENT (OUTPATIENT)
Facility: HOSPITAL | Age: 88
DRG: 853 | End: 2023-11-09
Attending: INTERNAL MEDICINE
Payer: MEDICARE

## 2023-11-09 ENCOUNTER — APPOINTMENT (OUTPATIENT)
Facility: HOSPITAL | Age: 88
DRG: 853 | End: 2023-11-09
Payer: MEDICARE

## 2023-11-09 PROBLEM — E87.0 HYPERNATREMIA: Status: ACTIVE | Noted: 2023-11-09

## 2023-11-09 PROBLEM — L98.429 SKIN ULCER OF SACRUM (HCC): Status: ACTIVE | Noted: 2023-11-09

## 2023-11-09 PROBLEM — L97.429 ULCER OF LEFT HEEL AND MIDFOOT (HCC): Status: ACTIVE | Noted: 2023-11-09

## 2023-11-09 PROBLEM — L97.419: Status: ACTIVE | Noted: 2023-11-09

## 2023-11-09 PROBLEM — E43 SEVERE PROTEIN-CALORIE MALNUTRITION (HCC): Status: ACTIVE | Noted: 2023-11-09

## 2023-11-09 PROBLEM — R79.89 TROPONIN LEVEL ELEVATED: Status: ACTIVE | Noted: 2023-11-09

## 2023-11-09 LAB
ALBUMIN SERPL-MCNC: 1.3 G/DL (ref 3.5–5)
ALBUMIN SERPL-MCNC: 1.3 G/DL (ref 3.5–5)
ALBUMIN/GLOB SERPL: 0.3 (ref 1.1–2.2)
ALP SERPL-CCNC: 74 U/L (ref 45–117)
ALT SERPL-CCNC: 10 U/L (ref 12–78)
ANION GAP SERPL CALC-SCNC: 10 MMOL/L (ref 5–15)
ANION GAP SERPL CALC-SCNC: 6 MMOL/L (ref 5–15)
AST SERPL W P-5'-P-CCNC: 14 U/L (ref 15–37)
BASOPHILS # BLD: 0 K/UL (ref 0–0.1)
BASOPHILS NFR BLD: 0 % (ref 0–1)
BILIRUB SERPL-MCNC: 0.6 MG/DL (ref 0.2–1)
BUN SERPL-MCNC: 56 MG/DL (ref 6–20)
BUN SERPL-MCNC: 64 MG/DL (ref 6–20)
BUN/CREAT SERPL: 32 (ref 12–20)
BUN/CREAT SERPL: 35 (ref 12–20)
CA-I BLD-MCNC: 7.2 MG/DL (ref 8.5–10.1)
CA-I BLD-MCNC: 7.8 MG/DL (ref 8.5–10.1)
CHLORIDE SERPL-SCNC: 121 MMOL/L (ref 97–108)
CHLORIDE SERPL-SCNC: 123 MMOL/L (ref 97–108)
CO2 SERPL-SCNC: 20 MMOL/L (ref 21–32)
CO2 SERPL-SCNC: 21 MMOL/L (ref 21–32)
CREAT SERPL-MCNC: 1.76 MG/DL (ref 0.55–1.02)
CREAT SERPL-MCNC: 1.82 MG/DL (ref 0.55–1.02)
CREAT UR-MCNC: 53 MG/DL
DIFFERENTIAL METHOD BLD: ABNORMAL
EKG ATRIAL RATE: 88 BPM
EKG DIAGNOSIS: NORMAL
EKG P AXIS: 46 DEGREES
EKG P-R INTERVAL: 148 MS
EKG Q-T INTERVAL: 328 MS
EKG QRS DURATION: 74 MS
EKG QTC CALCULATION (BAZETT): 396 MS
EKG R AXIS: 54 DEGREES
EKG T AXIS: 85 DEGREES
EKG VENTRICULAR RATE: 88 BPM
EOSINOPHIL # BLD: 0 K/UL (ref 0–0.4)
EOSINOPHIL NFR BLD: 0 % (ref 0–7)
ERYTHROCYTE [DISTWIDTH] IN BLOOD BY AUTOMATED COUNT: 22.3 % (ref 11.5–14.5)
GLOBULIN SER CALC-MCNC: 3.8 G/DL (ref 2–4)
GLUCOSE SERPL-MCNC: 162 MG/DL (ref 65–100)
GLUCOSE SERPL-MCNC: 177 MG/DL (ref 65–100)
HCT VFR BLD AUTO: 26.1 % (ref 35–47)
HGB BLD-MCNC: 8.3 G/DL (ref 11.5–16)
IMM GRANULOCYTES # BLD AUTO: 0.7 K/UL (ref 0–0.04)
IMM GRANULOCYTES NFR BLD AUTO: 2 % (ref 0–0.5)
LACTATE SERPL-SCNC: 3.5 MMOL/L (ref 0.4–2)
LYMPHOCYTES # BLD: 1.8 K/UL (ref 0.8–3.5)
LYMPHOCYTES NFR BLD: 5 % (ref 12–49)
MCH RBC QN AUTO: 27 PG (ref 26–34)
MCHC RBC AUTO-ENTMCNC: 31.8 G/DL (ref 30–36.5)
MCV RBC AUTO: 85 FL (ref 80–99)
MONOCYTES # BLD: 1.1 K/UL (ref 0–1)
MONOCYTES NFR BLD: 3 % (ref 5–13)
MRSA DNA SPEC QL NAA+PROBE: NOT DETECTED
NEUTS SEG # BLD: 32.8 K/UL (ref 1.8–8)
NEUTS SEG NFR BLD: 90 % (ref 32–75)
NRBC # BLD: 0 K/UL (ref 0–0.01)
NRBC BLD-RTO: 0 PER 100 WBC
PHOSPHATE SERPL-MCNC: 3.2 MG/DL (ref 2.6–4.7)
PLATELET # BLD AUTO: 658 K/UL (ref 150–400)
PLATELET COMMENT: ABNORMAL
POTASSIUM SERPL-SCNC: 2.9 MMOL/L (ref 3.5–5.1)
POTASSIUM SERPL-SCNC: 3.7 MMOL/L (ref 3.5–5.1)
PROT SERPL-MCNC: 5.1 G/DL (ref 6.4–8.2)
RBC # BLD AUTO: 3.07 M/UL (ref 3.8–5.2)
RBC MORPH BLD: ABNORMAL
SODIUM SERPL-SCNC: 150 MMOL/L (ref 136–145)
SODIUM SERPL-SCNC: 151 MMOL/L (ref 136–145)
SODIUM UR-SCNC: 38 MMOL/L
TROPONIN I SERPL HS-MCNC: 278 NG/L (ref 0–51)
TROPONIN I SERPL HS-MCNC: 328 NG/L (ref 0–51)
TROPONIN I SERPL HS-MCNC: 387 NG/L (ref 0–51)
WBC # BLD AUTO: 36.4 K/UL (ref 3.6–11)

## 2023-11-09 PROCEDURE — 2580000003 HC RX 258: Performed by: INTERNAL MEDICINE

## 2023-11-09 PROCEDURE — 87641 MR-STAPH DNA AMP PROBE: CPT

## 2023-11-09 PROCEDURE — 2500000003 HC RX 250 WO HCPCS: Performed by: INTERNAL MEDICINE

## 2023-11-09 PROCEDURE — 84484 ASSAY OF TROPONIN QUANT: CPT

## 2023-11-09 PROCEDURE — 6370000000 HC RX 637 (ALT 250 FOR IP): Performed by: INTERNAL MEDICINE

## 2023-11-09 PROCEDURE — 93306 TTE W/DOPPLER COMPLETE: CPT

## 2023-11-09 PROCEDURE — 6360000002 HC RX W HCPCS: Performed by: INTERNAL MEDICINE

## 2023-11-09 PROCEDURE — 80069 RENAL FUNCTION PANEL: CPT

## 2023-11-09 PROCEDURE — 85025 COMPLETE CBC W/AUTO DIFF WBC: CPT

## 2023-11-09 PROCEDURE — 2000000000 HC ICU R&B

## 2023-11-09 PROCEDURE — 99223 1ST HOSP IP/OBS HIGH 75: CPT | Performed by: INTERNAL MEDICINE

## 2023-11-09 PROCEDURE — 80053 COMPREHEN METABOLIC PANEL: CPT

## 2023-11-09 PROCEDURE — 83605 ASSAY OF LACTIC ACID: CPT

## 2023-11-09 PROCEDURE — 36415 COLL VENOUS BLD VENIPUNCTURE: CPT

## 2023-11-09 PROCEDURE — 71045 X-RAY EXAM CHEST 1 VIEW: CPT

## 2023-11-09 RX ORDER — POTASSIUM CHLORIDE 20 MEQ/1
40 TABLET, EXTENDED RELEASE ORAL EVERY 4 HOURS
Status: DISPENSED | OUTPATIENT
Start: 2023-11-09 | End: 2023-11-09

## 2023-11-09 RX ORDER — ASPIRIN 81 MG/1
81 TABLET ORAL DAILY
Status: DISCONTINUED | OUTPATIENT
Start: 2023-11-09 | End: 2023-11-09

## 2023-11-09 RX ORDER — ASPIRIN 325 MG
325 TABLET ORAL DAILY
Status: DISCONTINUED | OUTPATIENT
Start: 2023-11-09 | End: 2023-11-09

## 2023-11-09 RX ORDER — DEXTROSE AND SODIUM CHLORIDE 5; .9 G/100ML; G/100ML
INJECTION, SOLUTION INTRAVENOUS CONTINUOUS
Status: DISCONTINUED | OUTPATIENT
Start: 2023-11-09 | End: 2023-11-11

## 2023-11-09 RX ORDER — PHENYLEPHRINE HCL IN 0.9% NACL 50MG/250ML
10-300 PLASTIC BAG, INJECTION (ML) INTRAVENOUS CONTINUOUS
Status: DISCONTINUED | OUTPATIENT
Start: 2023-11-09 | End: 2023-11-15

## 2023-11-09 RX ORDER — FLUCONAZOLE 2 MG/ML
200 INJECTION, SOLUTION INTRAVENOUS EVERY 24 HOURS
Status: COMPLETED | OUTPATIENT
Start: 2023-11-09 | End: 2023-11-13

## 2023-11-09 RX ORDER — 0.9 % SODIUM CHLORIDE 0.9 %
250 INTRAVENOUS SOLUTION INTRAVENOUS ONCE
Status: COMPLETED | OUTPATIENT
Start: 2023-11-09 | End: 2023-11-09

## 2023-11-09 RX ORDER — DEXTROSE MONOHYDRATE 50 MG/ML
INJECTION, SOLUTION INTRAVENOUS CONTINUOUS
Status: DISCONTINUED | OUTPATIENT
Start: 2023-11-09 | End: 2023-11-09

## 2023-11-09 RX ORDER — CASTOR OIL AND BALSAM, PERU 788; 87 MG/G; MG/G
OINTMENT TOPICAL 2 TIMES DAILY
Status: DISCONTINUED | OUTPATIENT
Start: 2023-11-09 | End: 2023-11-17

## 2023-11-09 RX ORDER — 0.9 % SODIUM CHLORIDE 0.9 %
500 INTRAVENOUS SOLUTION INTRAVENOUS ONCE
Status: COMPLETED | OUTPATIENT
Start: 2023-11-09 | End: 2023-11-09

## 2023-11-09 RX ORDER — ASPIRIN 81 MG/1
81 TABLET ORAL DAILY
Status: DISCONTINUED | OUTPATIENT
Start: 2023-11-10 | End: 2023-12-01 | Stop reason: HOSPADM

## 2023-11-09 RX ADMIN — ENOXAPARIN SODIUM 30 MG: 100 INJECTION SUBCUTANEOUS at 08:43

## 2023-11-09 RX ADMIN — DEXTROSE AND SODIUM CHLORIDE: 5; 900 INJECTION, SOLUTION INTRAVENOUS at 21:20

## 2023-11-09 RX ADMIN — DEXTROSE MONOHYDRATE: 50 INJECTION, SOLUTION INTRAVENOUS at 10:16

## 2023-11-09 RX ADMIN — Medication 15 MCG/MIN: at 21:24

## 2023-11-09 RX ADMIN — SODIUM CHLORIDE 500 ML: 9 INJECTION, SOLUTION INTRAVENOUS at 17:02

## 2023-11-09 RX ADMIN — PIPERACILLIN AND TAZOBACTAM 3375 MG: 3; .375 INJECTION, POWDER, LYOPHILIZED, FOR SOLUTION INTRAVENOUS at 06:03

## 2023-11-09 RX ADMIN — DEXTROSE AND SODIUM CHLORIDE: 5; 450 INJECTION, SOLUTION INTRAVENOUS at 04:54

## 2023-11-09 RX ADMIN — ASPIRIN 325 MG: 325 TABLET ORAL at 08:31

## 2023-11-09 RX ADMIN — HYDROMORPHONE HYDROCHLORIDE 0.25 MG: 1 INJECTION, SOLUTION INTRAMUSCULAR; INTRAVENOUS; SUBCUTANEOUS at 22:56

## 2023-11-09 RX ADMIN — VANCOMYCIN HYDROCHLORIDE 750 MG: 750 INJECTION, POWDER, LYOPHILIZED, FOR SOLUTION INTRAVENOUS at 11:33

## 2023-11-09 RX ADMIN — PIPERACILLIN AND TAZOBACTAM 3375 MG: 3; .375 INJECTION, POWDER, LYOPHILIZED, FOR SOLUTION INTRAVENOUS at 18:31

## 2023-11-09 RX ADMIN — POTASSIUM CHLORIDE 40 MEQ: 1500 TABLET, EXTENDED RELEASE ORAL at 08:31

## 2023-11-09 RX ADMIN — Medication 15 MCG/MIN: at 20:25

## 2023-11-09 RX ADMIN — SODIUM CHLORIDE, PRESERVATIVE FREE 10 ML: 5 INJECTION INTRAVENOUS at 21:20

## 2023-11-09 RX ADMIN — CASTOR OIL AND BALSAM, PERU: 788; 87 OINTMENT TOPICAL at 10:16

## 2023-11-09 RX ADMIN — CASTOR OIL AND BALSAM, PERU: 788; 87 OINTMENT TOPICAL at 22:57

## 2023-11-09 RX ADMIN — HYDROMORPHONE HYDROCHLORIDE 0.25 MG: 1 INJECTION, SOLUTION INTRAMUSCULAR; INTRAVENOUS; SUBCUTANEOUS at 02:42

## 2023-11-09 RX ADMIN — FLUCONAZOLE 200 MG: 200 INJECTION, SOLUTION INTRAVENOUS at 21:20

## 2023-11-09 RX ADMIN — SODIUM CHLORIDE, PRESERVATIVE FREE 10 ML: 5 INJECTION INTRAVENOUS at 10:17

## 2023-11-09 RX ADMIN — SODIUM CHLORIDE 250 ML: 9 INJECTION, SOLUTION INTRAVENOUS at 17:56

## 2023-11-09 ASSESSMENT — PAIN SCALES - GENERAL
PAINLEVEL_OUTOF10: 0
PAINLEVEL_OUTOF10: 0
PAINLEVEL_OUTOF10: 3
PAINLEVEL_OUTOF10: 7

## 2023-11-09 ASSESSMENT — PAIN - FUNCTIONAL ASSESSMENT: PAIN_FUNCTIONAL_ASSESSMENT: ACTIVITIES ARE NOT PREVENTED

## 2023-11-09 ASSESSMENT — PAIN SCALES - WONG BAKER: WONGBAKER_NUMERICALRESPONSE: 4

## 2023-11-09 ASSESSMENT — PAIN DESCRIPTION - LOCATION: LOCATION: SACRUM

## 2023-11-09 ASSESSMENT — PAIN DESCRIPTION - DESCRIPTORS: DESCRIPTORS: ACHING

## 2023-11-09 ASSESSMENT — PAIN DESCRIPTION - ORIENTATION: ORIENTATION: POSTERIOR

## 2023-11-09 NOTE — WOUND CARE
IP WOUND CONSULT    John Mullins  MEDICAL RECORD NUMBER:  [id number]  AGE: 80 y.o. GENDER: female  : 1933  TODAY'S DATE:  2023    GENERAL     [] Follow-up   [x] New Consult    John Mullins is a 80 y.o. female referred by:   [x] Physician  [] Nursing  [] Other:         PAST MEDICAL HISTORY    Past Medical History:   Diagnosis Date    Asthma     Bereavement 3/30/16    63 yo con  - multiple myleoma     Cholelithiasis     Cough     Dyslipidemia     Hip pain, chronic, left 2017    Hypertension     Low back pain 16    Osteoarthritis     Osteoporosis 16    Prediabetes     S/P colonoscopy 7/15    vcu    Trigger finger, right 2017    Wedge compression fx of second thoracic vertebra with routine healing 16    t11        PAST SURGICAL HISTORY    Past Surgical History:   Procedure Laterality Date    COLONOSCOPY      GYN      IR IVC FILTER PLACEMENT W IMAGING  2023    IR IVC FILTER PLACEMENT W IMAGING 2023 Leif Netter., APRN - NP SSR RAD ANGIO IR       FAMILY HISTORY    Family History   Problem Relation Age of Onset    Cancer Son          ALLERGIES    Allergies   Allergen Reactions    Rosuvastatin Swelling       MEDICATIONS    No current facility-administered medications on file prior to encounter.      Current Outpatient Medications on File Prior to Encounter   Medication Sig Dispense Refill    amLODIPine (NORVASC) 2.5 MG tablet Take 1 tablet by mouth daily      docusate sodium (ENEMEEZ) 283 MG enema Place 5 mLs rectally as needed for Constipation (Patient not taking: Reported on 2023)      bisacodyl (DULCOLAX) 10 MG suppository Place 1 suppository rectally as needed for Constipation (Patient not taking: Reported on 2023)      calcium carbonate (TUMS) 500 MG chewable tablet Take 1 tablet by mouth 4 times daily as needed for Heartburn      hydrALAZINE (APRESOLINE) 25 MG tablet Take 1 tablet by mouth every 6 hours as needed (Elevated blood pressure)

## 2023-11-10 ENCOUNTER — APPOINTMENT (OUTPATIENT)
Facility: HOSPITAL | Age: 88
DRG: 853 | End: 2023-11-10
Attending: INTERNAL MEDICINE
Payer: MEDICARE

## 2023-11-10 ENCOUNTER — APPOINTMENT (OUTPATIENT)
Facility: HOSPITAL | Age: 88
DRG: 853 | End: 2023-11-10
Payer: MEDICARE

## 2023-11-10 LAB
ALBUMIN SERPL-MCNC: 1.6 G/DL (ref 3.5–5)
ALBUMIN/GLOB SERPL: 0.4 (ref 1.1–2.2)
ALP SERPL-CCNC: 106 U/L (ref 45–117)
ALT SERPL-CCNC: 13 U/L (ref 12–78)
ANION GAP SERPL CALC-SCNC: 8 MMOL/L (ref 5–15)
AST SERPL W P-5'-P-CCNC: 18 U/L (ref 15–37)
BASOPHILS # BLD: 0 K/UL (ref 0–0.1)
BASOPHILS NFR BLD: 0 % (ref 0–1)
BILIRUB SERPL-MCNC: 0.6 MG/DL (ref 0.2–1)
BUN SERPL-MCNC: 51 MG/DL (ref 6–20)
BUN/CREAT SERPL: 28 (ref 12–20)
CA-I BLD-MCNC: 7.3 MG/DL (ref 8.5–10.1)
CHLORIDE SERPL-SCNC: 123 MMOL/L (ref 97–108)
CO2 SERPL-SCNC: 20 MMOL/L (ref 21–32)
CREAT SERPL-MCNC: 1.83 MG/DL (ref 0.55–1.02)
CRP SERPL-MCNC: 30.5 MG/DL (ref 0–0.6)
DIFFERENTIAL METHOD BLD: ABNORMAL
ECHO AO ASC DIAM: 2.1 CM
ECHO AO ASCENDING AORTA INDEX: 1.35 CM/M2
ECHO AO ROOT DIAM: 2.6 CM
ECHO AO ROOT INDEX: 1.67 CM/M2
ECHO AV MEAN GRADIENT: 31 MMHG
ECHO AV MEAN VELOCITY: 2.1 M/S
ECHO AV PEAK GRADIENT: 25 MMHG
ECHO AV PEAK VELOCITY: 3.9 M/S
ECHO AV VELOCITY RATIO: 0.38
ECHO AV VTI: 57.8 CM
ECHO BSA: 1.56 M2
ECHO EST RA PRESSURE: 3 MMHG
ECHO LA AREA 2C: 3.1 CM2
ECHO LA AREA 4C: 5.6 CM2
ECHO LA DIAMETER INDEX: 1.73 CM/M2
ECHO LA DIAMETER: 2.7 CM
ECHO LA MAJOR AXIS: 2.8 CM
ECHO LA MINOR AXIS: 2.1 CM
ECHO LA TO AORTIC ROOT RATIO: 1.04
ECHO LA VOL MOD A2C: 3 ML (ref 22–52)
ECHO LA VOL MOD A4C: 8 ML (ref 22–52)
ECHO LA VOLUME INDEX MOD A2C: 2 ML/M2 (ref 16–34)
ECHO LA VOLUME INDEX MOD A4C: 5 ML/M2 (ref 16–34)
ECHO LV EDV A2C: 6 ML
ECHO LV EDV A4C: 17 ML
ECHO LV EDV INDEX A4C: 11 ML/M2
ECHO LV EDV NDEX A2C: 4 ML/M2
ECHO LV EJECTION FRACTION A2C: 48 %
ECHO LV EJECTION FRACTION A4C: 31 %
ECHO LV ESV A2C: 3 ML
ECHO LV ESV A4C: 12 ML
ECHO LV ESV INDEX A2C: 2 ML/M2
ECHO LV ESV INDEX A4C: 8 ML/M2
ECHO LV FRACTIONAL SHORTENING: 39 % (ref 28–44)
ECHO LV INTERNAL DIMENSION DIASTOLE INDEX: 1.99 CM/M2
ECHO LV INTERNAL DIMENSION DIASTOLIC: 3.1 CM (ref 3.9–5.3)
ECHO LV INTERNAL DIMENSION SYSTOLIC INDEX: 1.22 CM/M2
ECHO LV INTERNAL DIMENSION SYSTOLIC: 1.9 CM
ECHO LV IVSD: 1 CM (ref 0.6–0.9)
ECHO LV MASS 2D: 79.8 G (ref 67–162)
ECHO LV MASS INDEX 2D: 51.2 G/M2 (ref 43–95)
ECHO LV POSTERIOR WALL DIASTOLIC: 0.9 CM (ref 0.6–0.9)
ECHO LV RELATIVE WALL THICKNESS RATIO: 0.58
ECHO LVOT AV VTI INDEX: 0.41
ECHO LVOT MEAN GRADIENT: 5 MMHG
ECHO LVOT PEAK GRADIENT: 9 MMHG
ECHO LVOT PEAK VELOCITY: 1.5 M/S
ECHO LVOT VTI: 23.6 CM
ECHO MV LVOT VTI INDEX: 0.67
ECHO MV MAX VELOCITY: 1 M/S
ECHO MV MEAN GRADIENT: 1 MMHG
ECHO MV MEAN VELOCITY: 0.5 M/S
ECHO MV PEAK GRADIENT: 4 MMHG
ECHO MV VTI: 15.9 CM
ECHO PV MAX VELOCITY: 0.6 M/S
ECHO PV PEAK GRADIENT: 1 MMHG
ECHO RIGHT VENTRICULAR SYSTOLIC PRESSURE (RVSP): 28 MMHG
ECHO RV BASAL DIMENSION: 2.6 CM
ECHO RV MID DIMENSION: 2 CM
ECHO TV REGURGITANT MAX VELOCITY: 2.49 M/S
ECHO TV REGURGITANT PEAK GRADIENT: 25 MMHG
EOSINOPHIL # BLD: 0 K/UL (ref 0–0.4)
EOSINOPHIL NFR BLD: 0 % (ref 0–7)
ERYTHROCYTE [DISTWIDTH] IN BLOOD BY AUTOMATED COUNT: 22.9 % (ref 11.5–14.5)
GLOBULIN SER CALC-MCNC: 4.4 G/DL (ref 2–4)
GLUCOSE BLD STRIP.AUTO-MCNC: 143 MG/DL (ref 65–100)
GLUCOSE SERPL-MCNC: 143 MG/DL (ref 65–100)
HCT VFR BLD AUTO: 26.2 % (ref 35–47)
HGB BLD-MCNC: 7.8 G/DL (ref 11.5–16)
IMM GRANULOCYTES # BLD AUTO: 0 K/UL
IMM GRANULOCYTES NFR BLD AUTO: 0 %
LYMPHOCYTES # BLD: 2.1 K/UL (ref 0.8–3.5)
LYMPHOCYTES NFR BLD: 6 % (ref 12–49)
MCH RBC QN AUTO: 25.8 PG (ref 26–34)
MCHC RBC AUTO-ENTMCNC: 29.8 G/DL (ref 30–36.5)
MCV RBC AUTO: 86.8 FL (ref 80–99)
MONOCYTES # BLD: 1.1 K/UL (ref 0–1)
MONOCYTES NFR BLD: 3 % (ref 5–13)
NEUTS BAND NFR BLD MANUAL: 1 % (ref 0–6)
NEUTS SEG # BLD: 32 K/UL (ref 1.8–8)
NEUTS SEG NFR BLD: 90 % (ref 32–75)
NRBC # BLD: 0.03 K/UL (ref 0–0.01)
NRBC BLD-RTO: 0.1 PER 100 WBC
PERFORMED BY:: ABNORMAL
PLATELET # BLD AUTO: 497 K/UL (ref 150–400)
POTASSIUM SERPL-SCNC: 3.2 MMOL/L (ref 3.5–5.1)
PROCALCITONIN SERPL-MCNC: 2.08 NG/ML
PROT SERPL-MCNC: 6 G/DL (ref 6.4–8.2)
RBC # BLD AUTO: 3.02 M/UL (ref 3.8–5.2)
RBC MORPH BLD: ABNORMAL
SODIUM SERPL-SCNC: 151 MMOL/L (ref 136–145)
VANCOMYCIN SERPL-MCNC: 19.3 UG/ML
WBC # BLD AUTO: 35.2 K/UL (ref 3.6–11)
WBC MORPH BLD: ABNORMAL

## 2023-11-10 PROCEDURE — 6360000002 HC RX W HCPCS: Performed by: INTERNAL MEDICINE

## 2023-11-10 PROCEDURE — 86140 C-REACTIVE PROTEIN: CPT

## 2023-11-10 PROCEDURE — 82962 GLUCOSE BLOOD TEST: CPT

## 2023-11-10 PROCEDURE — 0JBQ0ZZ EXCISION OF RIGHT FOOT SUBCUTANEOUS TISSUE AND FASCIA, OPEN APPROACH: ICD-10-PCS | Performed by: PODIATRIST

## 2023-11-10 PROCEDURE — 2000000000 HC ICU R&B

## 2023-11-10 PROCEDURE — 84145 PROCALCITONIN (PCT): CPT

## 2023-11-10 PROCEDURE — 80053 COMPREHEN METABOLIC PANEL: CPT

## 2023-11-10 PROCEDURE — 36415 COLL VENOUS BLD VENIPUNCTURE: CPT

## 2023-11-10 PROCEDURE — 6370000000 HC RX 637 (ALT 250 FOR IP): Performed by: INTERNAL MEDICINE

## 2023-11-10 PROCEDURE — 2500000003 HC RX 250 WO HCPCS: Performed by: INTERNAL MEDICINE

## 2023-11-10 PROCEDURE — 80202 ASSAY OF VANCOMYCIN: CPT

## 2023-11-10 PROCEDURE — 76937 US GUIDE VASCULAR ACCESS: CPT

## 2023-11-10 PROCEDURE — 99232 SBSQ HOSP IP/OBS MODERATE 35: CPT | Performed by: INTERNAL MEDICINE

## 2023-11-10 PROCEDURE — 2580000003 HC RX 258: Performed by: INTERNAL MEDICINE

## 2023-11-10 PROCEDURE — 36556 INSERT NON-TUNNEL CV CATH: CPT

## 2023-11-10 PROCEDURE — 71045 X-RAY EXAM CHEST 1 VIEW: CPT

## 2023-11-10 PROCEDURE — 85025 COMPLETE CBC W/AUTO DIFF WBC: CPT

## 2023-11-10 RX ADMIN — PIPERACILLIN AND TAZOBACTAM 3375 MG: 3; .375 INJECTION, POWDER, LYOPHILIZED, FOR SOLUTION INTRAVENOUS at 16:47

## 2023-11-10 RX ADMIN — CASTOR OIL AND BALSAM, PERU: 788; 87 OINTMENT TOPICAL at 20:20

## 2023-11-10 RX ADMIN — DEXTROSE AND SODIUM CHLORIDE: 5; 900 INJECTION, SOLUTION INTRAVENOUS at 22:38

## 2023-11-10 RX ADMIN — DEXTROSE AND SODIUM CHLORIDE: 5; 900 INJECTION, SOLUTION INTRAVENOUS at 03:33

## 2023-11-10 RX ADMIN — CASTOR OIL AND BALSAM, PERU: 788; 87 OINTMENT TOPICAL at 09:30

## 2023-11-10 RX ADMIN — PIPERACILLIN AND TAZOBACTAM 3375 MG: 3; .375 INJECTION, POWDER, LYOPHILIZED, FOR SOLUTION INTRAVENOUS at 04:17

## 2023-11-10 RX ADMIN — HYDROMORPHONE HYDROCHLORIDE 0.25 MG: 1 INJECTION, SOLUTION INTRAMUSCULAR; INTRAVENOUS; SUBCUTANEOUS at 10:13

## 2023-11-10 RX ADMIN — SODIUM CHLORIDE, PRESERVATIVE FREE 10 ML: 5 INJECTION INTRAVENOUS at 20:20

## 2023-11-10 RX ADMIN — ENOXAPARIN SODIUM 30 MG: 100 INJECTION SUBCUTANEOUS at 09:30

## 2023-11-10 RX ADMIN — Medication 60 MCG/MIN: at 14:39

## 2023-11-10 RX ADMIN — ASPIRIN 81 MG: 81 TABLET, COATED ORAL at 09:31

## 2023-11-10 RX ADMIN — FLUCONAZOLE 200 MG: 200 INJECTION, SOLUTION INTRAVENOUS at 20:20

## 2023-11-10 RX ADMIN — SODIUM CHLORIDE, PRESERVATIVE FREE 10 ML: 5 INJECTION INTRAVENOUS at 09:30

## 2023-11-10 ASSESSMENT — PAIN DESCRIPTION - ORIENTATION: ORIENTATION: RIGHT

## 2023-11-10 ASSESSMENT — PAIN DESCRIPTION - LOCATION: LOCATION: FOOT

## 2023-11-10 ASSESSMENT — PAIN SCALES - WONG BAKER: WONGBAKER_NUMERICALRESPONSE: 0

## 2023-11-10 ASSESSMENT — PAIN SCALES - GENERAL
PAINLEVEL_OUTOF10: 0
PAINLEVEL_OUTOF10: 7

## 2023-11-11 LAB
ALBUMIN SERPL-MCNC: 1.3 G/DL (ref 3.5–5)
ANION GAP SERPL CALC-SCNC: 8 MMOL/L (ref 5–15)
BASOPHILS # BLD: 0 K/UL (ref 0–0.1)
BASOPHILS NFR BLD: 0 % (ref 0–1)
BUN SERPL-MCNC: 34 MG/DL (ref 6–20)
BUN/CREAT SERPL: 22 (ref 12–20)
CA-I BLD-MCNC: 6.9 MG/DL (ref 8.5–10.1)
CHLORIDE SERPL-SCNC: 129 MMOL/L (ref 97–108)
CO2 SERPL-SCNC: 19 MMOL/L (ref 21–32)
CREAT SERPL-MCNC: 1.54 MG/DL (ref 0.55–1.02)
CRP SERPL-MCNC: 25.9 MG/DL (ref 0–0.6)
DIFFERENTIAL METHOD BLD: ABNORMAL
EOSINOPHIL # BLD: 0 K/UL (ref 0–0.4)
EOSINOPHIL NFR BLD: 0 % (ref 0–7)
ERYTHROCYTE [DISTWIDTH] IN BLOOD BY AUTOMATED COUNT: 25.1 % (ref 11.5–14.5)
GLUCOSE SERPL-MCNC: 171 MG/DL (ref 65–100)
HCT VFR BLD AUTO: 24.2 % (ref 35–47)
HGB BLD-MCNC: 7.4 G/DL (ref 11.5–16)
IMM GRANULOCYTES # BLD AUTO: 0 K/UL
IMM GRANULOCYTES NFR BLD AUTO: 0 %
LYMPHOCYTES # BLD: 1 K/UL (ref 0.8–3.5)
LYMPHOCYTES NFR BLD: 3 % (ref 12–49)
MCH RBC QN AUTO: 25.5 PG (ref 26–34)
MCHC RBC AUTO-ENTMCNC: 30.6 G/DL (ref 30–36.5)
MCV RBC AUTO: 83.4 FL (ref 80–99)
MONOCYTES # BLD: 0.7 K/UL (ref 0–1)
MONOCYTES NFR BLD: 2 % (ref 5–13)
NEUTS SEG # BLD: 32.1 K/UL (ref 1.8–8)
NEUTS SEG NFR BLD: 95 % (ref 32–75)
NRBC # BLD: 0.05 K/UL (ref 0–0.01)
NRBC BLD-RTO: 0.1 PER 100 WBC
PHOSPHATE SERPL-MCNC: 2.9 MG/DL (ref 2.6–4.7)
PLATELET # BLD AUTO: 196 K/UL (ref 150–400)
POTASSIUM SERPL-SCNC: 2.8 MMOL/L (ref 3.5–5.1)
PROCALCITONIN SERPL-MCNC: 1.86 NG/ML
RBC # BLD AUTO: 2.9 M/UL (ref 3.8–5.2)
RBC MORPH BLD: ABNORMAL
RBC MORPH BLD: ABNORMAL
SODIUM SERPL-SCNC: 156 MMOL/L (ref 136–145)
VANCOMYCIN SERPL-MCNC: 15.6 UG/ML
WBC # BLD AUTO: 33.8 K/UL (ref 3.6–11)

## 2023-11-11 PROCEDURE — 86140 C-REACTIVE PROTEIN: CPT

## 2023-11-11 PROCEDURE — 6370000000 HC RX 637 (ALT 250 FOR IP): Performed by: INTERNAL MEDICINE

## 2023-11-11 PROCEDURE — 6360000002 HC RX W HCPCS: Performed by: INTERNAL MEDICINE

## 2023-11-11 PROCEDURE — P9047 ALBUMIN (HUMAN), 25%, 50ML: HCPCS | Performed by: INTERNAL MEDICINE

## 2023-11-11 PROCEDURE — 99233 SBSQ HOSP IP/OBS HIGH 50: CPT | Performed by: INTERNAL MEDICINE

## 2023-11-11 PROCEDURE — 2000000000 HC ICU R&B

## 2023-11-11 PROCEDURE — 2580000003 HC RX 258: Performed by: INTERNAL MEDICINE

## 2023-11-11 PROCEDURE — 36415 COLL VENOUS BLD VENIPUNCTURE: CPT

## 2023-11-11 PROCEDURE — 80202 ASSAY OF VANCOMYCIN: CPT

## 2023-11-11 PROCEDURE — 85025 COMPLETE CBC W/AUTO DIFF WBC: CPT

## 2023-11-11 PROCEDURE — 84145 PROCALCITONIN (PCT): CPT

## 2023-11-11 PROCEDURE — 80069 RENAL FUNCTION PANEL: CPT

## 2023-11-11 RX ORDER — DEXTROSE MONOHYDRATE 50 MG/ML
INJECTION, SOLUTION INTRAVENOUS CONTINUOUS
Status: DISCONTINUED | OUTPATIENT
Start: 2023-11-11 | End: 2023-11-11

## 2023-11-11 RX ORDER — ALBUMIN (HUMAN) 12.5 G/50ML
25 SOLUTION INTRAVENOUS EVERY 8 HOURS
Status: COMPLETED | OUTPATIENT
Start: 2023-11-11 | End: 2023-11-12

## 2023-11-11 RX ORDER — POTASSIUM CHLORIDE 20 MEQ/1
40 TABLET, EXTENDED RELEASE ORAL EVERY 4 HOURS
Status: DISCONTINUED | OUTPATIENT
Start: 2023-11-11 | End: 2023-11-11

## 2023-11-11 RX ORDER — POTASSIUM CHLORIDE 1.5 G/1.58G
20 POWDER, FOR SOLUTION ORAL 2 TIMES DAILY
Status: DISCONTINUED | OUTPATIENT
Start: 2023-11-11 | End: 2023-11-11

## 2023-11-11 RX ADMIN — POTASSIUM CHLORIDE: 2 INJECTION, SOLUTION, CONCENTRATE INTRAVENOUS at 19:50

## 2023-11-11 RX ADMIN — POTASSIUM BICARBONATE 20 MEQ: 782 TABLET, EFFERVESCENT ORAL at 19:55

## 2023-11-11 RX ADMIN — CASTOR OIL AND BALSAM, PERU: 788; 87 OINTMENT TOPICAL at 20:06

## 2023-11-11 RX ADMIN — POTASSIUM BICARBONATE 40 MEQ: 782 TABLET, EFFERVESCENT ORAL at 15:21

## 2023-11-11 RX ADMIN — CASTOR OIL AND BALSAM, PERU: 788; 87 OINTMENT TOPICAL at 08:26

## 2023-11-11 RX ADMIN — SODIUM CHLORIDE, PRESERVATIVE FREE 10 ML: 5 INJECTION INTRAVENOUS at 20:06

## 2023-11-11 RX ADMIN — SODIUM CHLORIDE, PRESERVATIVE FREE 10 ML: 5 INJECTION INTRAVENOUS at 08:19

## 2023-11-11 RX ADMIN — Medication 60 MCG/MIN: at 19:50

## 2023-11-11 RX ADMIN — ENOXAPARIN SODIUM 30 MG: 100 INJECTION SUBCUTANEOUS at 08:18

## 2023-11-11 RX ADMIN — PIPERACILLIN AND TAZOBACTAM 3375 MG: 3; .375 INJECTION, POWDER, LYOPHILIZED, FOR SOLUTION INTRAVENOUS at 04:11

## 2023-11-11 RX ADMIN — DEXTROSE MONOHYDRATE: 50 INJECTION, SOLUTION INTRAVENOUS at 09:56

## 2023-11-11 RX ADMIN — Medication 60 MCG/MIN: at 04:11

## 2023-11-11 RX ADMIN — POTASSIUM BICARBONATE 40 MEQ: 782 TABLET, EFFERVESCENT ORAL at 12:01

## 2023-11-11 RX ADMIN — FLUCONAZOLE 200 MG: 200 INJECTION, SOLUTION INTRAVENOUS at 19:55

## 2023-11-11 RX ADMIN — PIPERACILLIN AND TAZOBACTAM 3375 MG: 3; .375 INJECTION, POWDER, LYOPHILIZED, FOR SOLUTION INTRAVENOUS at 17:21

## 2023-11-11 RX ADMIN — VANCOMYCIN HYDROCHLORIDE 750 MG: 750 INJECTION, POWDER, LYOPHILIZED, FOR SOLUTION INTRAVENOUS at 14:12

## 2023-11-11 RX ADMIN — DEXTROSE AND SODIUM CHLORIDE: 5; 900 INJECTION, SOLUTION INTRAVENOUS at 06:06

## 2023-11-11 RX ADMIN — ASPIRIN 81 MG: 81 TABLET, COATED ORAL at 08:18

## 2023-11-11 RX ADMIN — ALBUMIN (HUMAN) 25 G: 0.25 INJECTION, SOLUTION INTRAVENOUS at 18:48

## 2023-11-11 ASSESSMENT — PAIN SCALES - GENERAL
PAINLEVEL_OUTOF10: 0

## 2023-11-12 LAB
25(OH)D3 SERPL-MCNC: 55.6 NG/ML (ref 30–100)
ALBUMIN SERPL-MCNC: 2.3 G/DL (ref 3.5–5)
ALBUMIN SERPL-MCNC: 2.3 G/DL (ref 3.5–5)
ALBUMIN/GLOB SERPL: 0.7 (ref 1.1–2.2)
ALP SERPL-CCNC: 92 U/L (ref 45–117)
ALT SERPL-CCNC: 11 U/L (ref 12–78)
ANION GAP SERPL CALC-SCNC: 6 MMOL/L (ref 5–15)
AST SERPL W P-5'-P-CCNC: 5 U/L (ref 15–37)
BASOPHILS # BLD: 0 K/UL (ref 0–0.1)
BASOPHILS NFR BLD: 0 % (ref 0–1)
BILIRUB DIRECT SERPL-MCNC: 0.3 MG/DL (ref 0–0.2)
BILIRUB SERPL-MCNC: 1 MG/DL (ref 0.2–1)
BUN SERPL-MCNC: 27 MG/DL (ref 6–20)
BUN/CREAT SERPL: 20 (ref 12–20)
CA-I BLD-MCNC: 6.8 MG/DL (ref 8.5–10.1)
CA-I BLD-MCNC: 7.1 MG/DL (ref 8.5–10.1)
CHLORIDE SERPL-SCNC: 122 MMOL/L (ref 97–108)
CHOLEST SERPL-MCNC: 84 MG/DL
CO2 SERPL-SCNC: 20 MMOL/L (ref 21–32)
CREAT SERPL-MCNC: 1.38 MG/DL (ref 0.55–1.02)
CRP SERPL-MCNC: 17.8 MG/DL (ref 0–0.6)
DIFFERENTIAL METHOD BLD: ABNORMAL
EOSINOPHIL # BLD: 0.2 K/UL (ref 0–0.4)
EOSINOPHIL NFR BLD: 1 % (ref 0–7)
ERYTHROCYTE [DISTWIDTH] IN BLOOD BY AUTOMATED COUNT: 24.1 % (ref 11.5–14.5)
FERRITIN SERPL-MCNC: 1084 NG/ML (ref 8–252)
GLOBULIN SER CALC-MCNC: 3.2 G/DL (ref 2–4)
GLUCOSE BLD STRIP.AUTO-MCNC: 211 MG/DL (ref 65–100)
GLUCOSE SERPL-MCNC: 152 MG/DL (ref 65–100)
HCT VFR BLD AUTO: 19.4 % (ref 35–47)
HDLC SERPL-MCNC: 14 MG/DL
HDLC SERPL: 6 (ref 0–5)
HGB BLD-MCNC: 6 G/DL (ref 11.5–16)
IMM GRANULOCYTES # BLD AUTO: 0.2 K/UL (ref 0–0.04)
IMM GRANULOCYTES NFR BLD AUTO: 1 % (ref 0–0.5)
IRON SATN MFR SERPL: 52 % (ref 20–50)
IRON SERPL-MCNC: 30 UG/DL (ref 35–150)
LDLC SERPL CALC-MCNC: 55.8 MG/DL (ref 0–100)
LIPID PANEL: ABNORMAL
LYMPHOCYTES # BLD: 1.5 K/UL (ref 0.8–3.5)
LYMPHOCYTES NFR BLD: 7 % (ref 12–49)
MAGNESIUM SERPL-MCNC: 1.5 MG/DL (ref 1.6–2.4)
MCH RBC QN AUTO: 25.5 PG (ref 26–34)
MCHC RBC AUTO-ENTMCNC: 30.9 G/DL (ref 30–36.5)
MCV RBC AUTO: 82.6 FL (ref 80–99)
MONOCYTES # BLD: 0.6 K/UL (ref 0–1)
MONOCYTES NFR BLD: 3 % (ref 5–13)
NEUTS SEG # BLD: 18.9 K/UL (ref 1.8–8)
NEUTS SEG NFR BLD: 88 % (ref 32–75)
NRBC # BLD: 0.05 K/UL (ref 0–0.01)
NRBC BLD-RTO: 0.2 PER 100 WBC
PERFORMED BY:: ABNORMAL
PHOSPHATE SERPL-MCNC: 1.9 MG/DL (ref 2.6–4.7)
PLATELET # BLD AUTO: 327 K/UL (ref 150–400)
PLATELET COMMENT: ABNORMAL
POTASSIUM SERPL-SCNC: 3.1 MMOL/L (ref 3.5–5.1)
PROCALCITONIN SERPL-MCNC: 2.15 NG/ML
PROT SERPL-MCNC: 5.5 G/DL (ref 6.4–8.2)
PTH-INTACT SERPL-MCNC: 243.8 PG/ML (ref 18.4–88)
RBC # BLD AUTO: 2.35 M/UL (ref 3.8–5.2)
RBC MORPH BLD: ABNORMAL
SODIUM SERPL-SCNC: 148 MMOL/L (ref 136–145)
T4 FREE SERPL-MCNC: 0.4 NG/DL (ref 0.8–1.5)
TIBC SERPL-MCNC: 58 UG/DL (ref 250–450)
TRIGL SERPL-MCNC: 71 MG/DL
TSH SERPL DL<=0.05 MIU/L-ACNC: 48.3 UIU/ML (ref 0.36–3.74)
VANCOMYCIN SERPL-MCNC: 16.3 UG/ML
VLDLC SERPL CALC-MCNC: 14.2 MG/DL
WBC # BLD AUTO: 21.4 K/UL (ref 3.6–11)

## 2023-11-12 PROCEDURE — 82962 GLUCOSE BLOOD TEST: CPT

## 2023-11-12 PROCEDURE — 36415 COLL VENOUS BLD VENIPUNCTURE: CPT

## 2023-11-12 PROCEDURE — 2000000000 HC ICU R&B

## 2023-11-12 PROCEDURE — 2500000003 HC RX 250 WO HCPCS: Performed by: INTERNAL MEDICINE

## 2023-11-12 PROCEDURE — 84439 ASSAY OF FREE THYROXINE: CPT

## 2023-11-12 PROCEDURE — 82728 ASSAY OF FERRITIN: CPT

## 2023-11-12 PROCEDURE — 82306 VITAMIN D 25 HYDROXY: CPT

## 2023-11-12 PROCEDURE — 6360000002 HC RX W HCPCS: Performed by: INTERNAL MEDICINE

## 2023-11-12 PROCEDURE — 2580000003 HC RX 258: Performed by: INTERNAL MEDICINE

## 2023-11-12 PROCEDURE — 80061 LIPID PANEL: CPT

## 2023-11-12 PROCEDURE — 83540 ASSAY OF IRON: CPT

## 2023-11-12 PROCEDURE — 86140 C-REACTIVE PROTEIN: CPT

## 2023-11-12 PROCEDURE — 84145 PROCALCITONIN (PCT): CPT

## 2023-11-12 PROCEDURE — 85025 COMPLETE CBC W/AUTO DIFF WBC: CPT

## 2023-11-12 PROCEDURE — 6370000000 HC RX 637 (ALT 250 FOR IP): Performed by: INTERNAL MEDICINE

## 2023-11-12 PROCEDURE — 83970 ASSAY OF PARATHORMONE: CPT

## 2023-11-12 PROCEDURE — 80202 ASSAY OF VANCOMYCIN: CPT

## 2023-11-12 PROCEDURE — 99233 SBSQ HOSP IP/OBS HIGH 50: CPT | Performed by: INTERNAL MEDICINE

## 2023-11-12 PROCEDURE — 80069 RENAL FUNCTION PANEL: CPT

## 2023-11-12 PROCEDURE — 83735 ASSAY OF MAGNESIUM: CPT

## 2023-11-12 PROCEDURE — 80076 HEPATIC FUNCTION PANEL: CPT

## 2023-11-12 PROCEDURE — P9047 ALBUMIN (HUMAN), 25%, 50ML: HCPCS | Performed by: INTERNAL MEDICINE

## 2023-11-12 PROCEDURE — 84443 ASSAY THYROID STIM HORMONE: CPT

## 2023-11-12 RX ORDER — ALBUMIN (HUMAN) 12.5 G/50ML
25 SOLUTION INTRAVENOUS EVERY 8 HOURS
Status: COMPLETED | OUTPATIENT
Start: 2023-11-12 | End: 2023-11-13

## 2023-11-12 RX ORDER — FUROSEMIDE 10 MG/ML
40 INJECTION INTRAMUSCULAR; INTRAVENOUS ONCE
Status: COMPLETED | OUTPATIENT
Start: 2023-11-12 | End: 2023-11-12

## 2023-11-12 RX ORDER — MAGNESIUM SULFATE 1 G/100ML
1000 INJECTION INTRAVENOUS ONCE
Status: COMPLETED | OUTPATIENT
Start: 2023-11-12 | End: 2023-11-12

## 2023-11-12 RX ORDER — MAGNESIUM SULFATE IN WATER 40 MG/ML
2000 INJECTION, SOLUTION INTRAVENOUS ONCE
Status: COMPLETED | OUTPATIENT
Start: 2023-11-12 | End: 2023-11-12

## 2023-11-12 RX ADMIN — PIPERACILLIN AND TAZOBACTAM 3375 MG: 3; .375 INJECTION, POWDER, LYOPHILIZED, FOR SOLUTION INTRAVENOUS at 16:30

## 2023-11-12 RX ADMIN — SODIUM CHLORIDE, PRESERVATIVE FREE 10 ML: 5 INJECTION INTRAVENOUS at 08:22

## 2023-11-12 RX ADMIN — MAGNESIUM SULFATE HEPTAHYDRATE 2000 MG: 40 INJECTION, SOLUTION INTRAVENOUS at 17:44

## 2023-11-12 RX ADMIN — CASTOR OIL AND BALSAM, PERU: 788; 87 OINTMENT TOPICAL at 08:22

## 2023-11-12 RX ADMIN — VANCOMYCIN HYDROCHLORIDE 750 MG: 750 INJECTION, POWDER, LYOPHILIZED, FOR SOLUTION INTRAVENOUS at 14:45

## 2023-11-12 RX ADMIN — CASTOR OIL AND BALSAM, PERU: 788; 87 OINTMENT TOPICAL at 20:36

## 2023-11-12 RX ADMIN — ALBUMIN (HUMAN) 25 G: 0.25 INJECTION, SOLUTION INTRAVENOUS at 02:52

## 2023-11-12 RX ADMIN — ENOXAPARIN SODIUM 30 MG: 100 INJECTION SUBCUTANEOUS at 08:31

## 2023-11-12 RX ADMIN — FLUCONAZOLE 200 MG: 200 INJECTION, SOLUTION INTRAVENOUS at 20:35

## 2023-11-12 RX ADMIN — ALBUMIN (HUMAN) 25 G: 0.25 INJECTION, SOLUTION INTRAVENOUS at 09:16

## 2023-11-12 RX ADMIN — SODIUM CHLORIDE, PRESERVATIVE FREE 10 ML: 5 INJECTION INTRAVENOUS at 20:35

## 2023-11-12 RX ADMIN — HYDROMORPHONE HYDROCHLORIDE 0.25 MG: 1 INJECTION, SOLUTION INTRAMUSCULAR; INTRAVENOUS; SUBCUTANEOUS at 09:16

## 2023-11-12 RX ADMIN — MAGNESIUM SULFATE HEPTAHYDRATE 1000 MG: 1 INJECTION, SOLUTION INTRAVENOUS at 12:18

## 2023-11-12 RX ADMIN — POTASSIUM BICARBONATE 20 MEQ: 782 TABLET, EFFERVESCENT ORAL at 08:20

## 2023-11-12 RX ADMIN — ALBUMIN (HUMAN) 25 G: 0.25 INJECTION, SOLUTION INTRAVENOUS at 17:44

## 2023-11-12 RX ADMIN — HYDROMORPHONE HYDROCHLORIDE 0.25 MG: 1 INJECTION, SOLUTION INTRAMUSCULAR; INTRAVENOUS; SUBCUTANEOUS at 16:25

## 2023-11-12 RX ADMIN — PIPERACILLIN AND TAZOBACTAM 3375 MG: 3; .375 INJECTION, POWDER, LYOPHILIZED, FOR SOLUTION INTRAVENOUS at 06:01

## 2023-11-12 RX ADMIN — POTASSIUM CHLORIDE: 2 INJECTION, SOLUTION, CONCENTRATE INTRAVENOUS at 17:44

## 2023-11-12 RX ADMIN — POTASSIUM CHLORIDE: 2 INJECTION, SOLUTION, CONCENTRATE INTRAVENOUS at 06:01

## 2023-11-12 RX ADMIN — POTASSIUM BICARBONATE 20 MEQ: 782 TABLET, EFFERVESCENT ORAL at 20:35

## 2023-11-12 RX ADMIN — ASPIRIN 81 MG: 81 TABLET, COATED ORAL at 08:20

## 2023-11-12 RX ADMIN — FUROSEMIDE 40 MG: 10 INJECTION, SOLUTION INTRAMUSCULAR; INTRAVENOUS at 18:56

## 2023-11-12 ASSESSMENT — PAIN SCALES - GENERAL
PAINLEVEL_OUTOF10: 0
PAINLEVEL_OUTOF10: 4
PAINLEVEL_OUTOF10: 0

## 2023-11-12 ASSESSMENT — PAIN SCALES - WONG BAKER
WONGBAKER_NUMERICALRESPONSE: 0
WONGBAKER_NUMERICALRESPONSE: 0

## 2023-11-13 LAB
ALBUMIN SERPL-MCNC: 3.1 G/DL (ref 3.5–5)
ALBUMIN SERPL-MCNC: 3.1 G/DL (ref 3.5–5)
ALBUMIN/GLOB SERPL: 1 (ref 1.1–2.2)
ALP SERPL-CCNC: 93 U/L (ref 45–117)
ALT SERPL-CCNC: 10 U/L (ref 12–78)
ANION GAP SERPL CALC-SCNC: 7 MMOL/L (ref 5–15)
AST SERPL W P-5'-P-CCNC: 6 U/L (ref 15–37)
BACTERIA SPEC CULT: NORMAL
BACTERIA SPEC CULT: NORMAL
BASOPHILS # BLD: 0 K/UL (ref 0–0.1)
BASOPHILS NFR BLD: 0 % (ref 0–1)
BILIRUB DIRECT SERPL-MCNC: 0.4 MG/DL (ref 0–0.2)
BILIRUB SERPL-MCNC: 1.1 MG/DL (ref 0.2–1)
BUN SERPL-MCNC: 22 MG/DL (ref 6–20)
BUN/CREAT SERPL: 17 (ref 12–20)
CA-I BLD-MCNC: 7.7 MG/DL (ref 8.5–10.1)
CHLORIDE SERPL-SCNC: 111 MMOL/L (ref 97–108)
CO2 SERPL-SCNC: 21 MMOL/L (ref 21–32)
CREAT SERPL-MCNC: 1.26 MG/DL (ref 0.55–1.02)
CRP SERPL-MCNC: 14.1 MG/DL (ref 0–0.6)
DIFFERENTIAL METHOD BLD: ABNORMAL
EOSINOPHIL # BLD: 0 K/UL (ref 0–0.4)
EOSINOPHIL NFR BLD: 0 % (ref 0–7)
ERYTHROCYTE [DISTWIDTH] IN BLOOD BY AUTOMATED COUNT: 24.7 % (ref 11.5–14.5)
GLOBULIN SER CALC-MCNC: 3 G/DL (ref 2–4)
GLUCOSE SERPL-MCNC: 129 MG/DL (ref 65–100)
HCT VFR BLD AUTO: 18.3 % (ref 35–47)
HGB BLD-MCNC: 5.8 G/DL (ref 11.5–16)
IMM GRANULOCYTES # BLD AUTO: 0 K/UL
IMM GRANULOCYTES NFR BLD AUTO: 0 %
LYMPHOCYTES # BLD: 0.8 K/UL (ref 0.8–3.5)
LYMPHOCYTES NFR BLD: 5 % (ref 12–49)
Lab: NORMAL
Lab: NORMAL
MCH RBC QN AUTO: 25.7 PG (ref 26–34)
MCHC RBC AUTO-ENTMCNC: 31.7 G/DL (ref 30–36.5)
MCV RBC AUTO: 81 FL (ref 80–99)
METAMYELOCYTES NFR BLD MANUAL: 2 %
MONOCYTES # BLD: 0 K/UL (ref 0–1)
MONOCYTES NFR BLD: 0 % (ref 5–13)
NEUTS BAND NFR BLD MANUAL: 2 % (ref 0–6)
NEUTS SEG # BLD: 15 K/UL (ref 1.8–8)
NEUTS SEG NFR BLD: 91 % (ref 32–75)
NRBC # BLD: 0.04 K/UL (ref 0–0.01)
NRBC BLD-RTO: 0.2 PER 100 WBC
PHOSPHATE SERPL-MCNC: 1.5 MG/DL (ref 2.6–4.7)
PLATELET # BLD AUTO: 232 K/UL (ref 150–400)
POTASSIUM SERPL-SCNC: 3.8 MMOL/L (ref 3.5–5.1)
PROCALCITONIN SERPL-MCNC: 1.84 NG/ML
PROT SERPL-MCNC: 6.1 G/DL (ref 6.4–8.2)
RBC # BLD AUTO: 2.26 M/UL (ref 3.8–5.2)
RBC MORPH BLD: ABNORMAL
SODIUM SERPL-SCNC: 139 MMOL/L (ref 136–145)
WBC # BLD AUTO: 16.1 K/UL (ref 3.6–11)

## 2023-11-13 PROCEDURE — 2000000000 HC ICU R&B

## 2023-11-13 PROCEDURE — 99232 SBSQ HOSP IP/OBS MODERATE 35: CPT | Performed by: INTERNAL MEDICINE

## 2023-11-13 PROCEDURE — 86140 C-REACTIVE PROTEIN: CPT

## 2023-11-13 PROCEDURE — 6360000002 HC RX W HCPCS: Performed by: INTERNAL MEDICINE

## 2023-11-13 PROCEDURE — 80069 RENAL FUNCTION PANEL: CPT

## 2023-11-13 PROCEDURE — 36415 COLL VENOUS BLD VENIPUNCTURE: CPT

## 2023-11-13 PROCEDURE — 6370000000 HC RX 637 (ALT 250 FOR IP): Performed by: INTERNAL MEDICINE

## 2023-11-13 PROCEDURE — P9047 ALBUMIN (HUMAN), 25%, 50ML: HCPCS | Performed by: INTERNAL MEDICINE

## 2023-11-13 PROCEDURE — 2580000003 HC RX 258: Performed by: INTERNAL MEDICINE

## 2023-11-13 PROCEDURE — 99232 SBSQ HOSP IP/OBS MODERATE 35: CPT | Performed by: PODIATRIST

## 2023-11-13 PROCEDURE — 2500000003 HC RX 250 WO HCPCS: Performed by: INTERNAL MEDICINE

## 2023-11-13 PROCEDURE — 85025 COMPLETE CBC W/AUTO DIFF WBC: CPT

## 2023-11-13 PROCEDURE — 80076 HEPATIC FUNCTION PANEL: CPT

## 2023-11-13 PROCEDURE — 84145 PROCALCITONIN (PCT): CPT

## 2023-11-13 PROCEDURE — 2500000003 HC RX 250 WO HCPCS: Performed by: PODIATRIST

## 2023-11-13 RX ORDER — FOLIC ACID 1 MG/1
1 TABLET ORAL DAILY
Status: DISCONTINUED | OUTPATIENT
Start: 2023-11-13 | End: 2023-12-01 | Stop reason: HOSPADM

## 2023-11-13 RX ORDER — HYDROMORPHONE HYDROCHLORIDE 1 MG/ML
0.25 INJECTION, SOLUTION INTRAMUSCULAR; INTRAVENOUS; SUBCUTANEOUS ONCE
Status: COMPLETED | OUTPATIENT
Start: 2023-11-13 | End: 2023-11-13

## 2023-11-13 RX ORDER — LEVOTHYROXINE SODIUM 0.03 MG/1
25 TABLET ORAL
Status: DISCONTINUED | OUTPATIENT
Start: 2023-11-13 | End: 2023-12-01 | Stop reason: HOSPADM

## 2023-11-13 RX ORDER — HYDROMORPHONE HYDROCHLORIDE 1 MG/ML
0.25 INJECTION, SOLUTION INTRAMUSCULAR; INTRAVENOUS; SUBCUTANEOUS EVERY 4 HOURS PRN
Status: DISPENSED | OUTPATIENT
Start: 2023-11-13 | End: 2023-11-15

## 2023-11-13 RX ORDER — ASCORBIC ACID 500 MG
500 TABLET ORAL DAILY
Status: DISCONTINUED | OUTPATIENT
Start: 2023-11-13 | End: 2023-12-01 | Stop reason: HOSPADM

## 2023-11-13 RX ORDER — FERROUS SULFATE 300 MG/5ML
300 LIQUID (ML) ORAL 2 TIMES DAILY WITH MEALS
Status: DISCONTINUED | OUTPATIENT
Start: 2023-11-13 | End: 2023-11-13

## 2023-11-13 RX ORDER — LANOLIN ALCOHOL/MO/W.PET/CERES
1000 CREAM (GRAM) TOPICAL DAILY
Status: DISCONTINUED | OUTPATIENT
Start: 2023-11-13 | End: 2023-12-01 | Stop reason: HOSPADM

## 2023-11-13 RX ORDER — FERROUS SULFATE 325(65) MG
325 TABLET ORAL 2 TIMES DAILY WITH MEALS
Status: DISCONTINUED | OUTPATIENT
Start: 2023-11-13 | End: 2023-11-13

## 2023-11-13 RX ADMIN — FLUCONAZOLE 200 MG: 200 INJECTION, SOLUTION INTRAVENOUS at 20:24

## 2023-11-13 RX ADMIN — IRON SUCROSE 100 MG: 20 INJECTION, SOLUTION INTRAVENOUS at 14:26

## 2023-11-13 RX ADMIN — ASPIRIN 81 MG: 81 TABLET, COATED ORAL at 08:27

## 2023-11-13 RX ADMIN — SODIUM CHLORIDE, PRESERVATIVE FREE 10 ML: 5 INJECTION INTRAVENOUS at 20:25

## 2023-11-13 RX ADMIN — SODIUM CHLORIDE, PRESERVATIVE FREE 10 ML: 5 INJECTION INTRAVENOUS at 08:28

## 2023-11-13 RX ADMIN — ERYTHROPOIETIN 40000 UNITS: 40000 INJECTION, SOLUTION INTRAVENOUS; SUBCUTANEOUS at 14:12

## 2023-11-13 RX ADMIN — PIPERACILLIN AND TAZOBACTAM 3375 MG: 3; .375 INJECTION, POWDER, LYOPHILIZED, FOR SOLUTION INTRAVENOUS at 05:00

## 2023-11-13 RX ADMIN — OXYCODONE HYDROCHLORIDE AND ACETAMINOPHEN 500 MG: 500 TABLET ORAL at 14:12

## 2023-11-13 RX ADMIN — ALBUMIN (HUMAN) 25 G: 0.25 INJECTION, SOLUTION INTRAVENOUS at 08:27

## 2023-11-13 RX ADMIN — CYANOCOBALAMIN TAB 1000 MCG 1000 MCG: 1000 TAB at 14:12

## 2023-11-13 RX ADMIN — HYDROMORPHONE HYDROCHLORIDE 0.25 MG: 1 INJECTION, SOLUTION INTRAMUSCULAR; INTRAVENOUS; SUBCUTANEOUS at 11:49

## 2023-11-13 RX ADMIN — LEVOTHYROXINE SODIUM 25 MCG: 0.03 TABLET ORAL at 14:12

## 2023-11-13 RX ADMIN — PIPERACILLIN AND TAZOBACTAM 3375 MG: 3; .375 INJECTION, POWDER, LYOPHILIZED, FOR SOLUTION INTRAVENOUS at 16:42

## 2023-11-13 RX ADMIN — FOLIC ACID 1 MG: 1 TABLET ORAL at 14:12

## 2023-11-13 RX ADMIN — HYDROMORPHONE HYDROCHLORIDE 0.25 MG: 1 INJECTION, SOLUTION INTRAMUSCULAR; INTRAVENOUS; SUBCUTANEOUS at 14:22

## 2023-11-13 RX ADMIN — POTASSIUM BICARBONATE 20 MEQ: 782 TABLET, EFFERVESCENT ORAL at 08:27

## 2023-11-13 RX ADMIN — ENOXAPARIN SODIUM 30 MG: 100 INJECTION SUBCUTANEOUS at 08:33

## 2023-11-13 RX ADMIN — ALBUMIN (HUMAN) 25 G: 0.25 INJECTION, SOLUTION INTRAVENOUS at 00:23

## 2023-11-13 RX ADMIN — CASTOR OIL AND BALSAM, PERU: 788; 87 OINTMENT TOPICAL at 20:24

## 2023-11-13 RX ADMIN — ACETAMINOPHEN 650 MG: 325 TABLET ORAL at 20:27

## 2023-11-13 RX ADMIN — CASTOR OIL AND BALSAM, PERU: 788; 87 OINTMENT TOPICAL at 08:28

## 2023-11-13 RX ADMIN — MINERAL SUPPLEMENT IRON 300 MG / 5 ML STRENGTH LIQUID 100 PER BOX UNFLAVORED 300 MG: at 11:49

## 2023-11-13 ASSESSMENT — PAIN SCALES - GENERAL
PAINLEVEL_OUTOF10: 0

## 2023-11-13 ASSESSMENT — PAIN DESCRIPTION - LOCATION: LOCATION: FOOT;BUTTOCKS

## 2023-11-13 ASSESSMENT — PAIN SCALES - PAIN ASSESSMENT IN ADVANCED DEMENTIA (PAINAD)
BREATHING: 0
BODYLANGUAGE: 0
NEGVOCALIZATION: 0
TOTALSCORE: 0
CONSOLABILITY: 0
FACIALEXPRESSION: 0

## 2023-11-13 NOTE — CARE COORDINATION
Patient transferred from  to ICU. Pending PT/OT eval. [facility] w/[facility] for [facility].           Alcides Fees, MSW

## 2023-11-13 NOTE — CARE COORDINATION
DNR code status. Recent clinicals sent to [facility]   Pending PT/OT evalMarylee Infield, ARLENE

## 2023-11-14 PROBLEM — R62.7 FAILURE TO THRIVE IN ADULT: Status: ACTIVE | Noted: 2023-11-14

## 2023-11-14 LAB
ALBUMIN SERPL-MCNC: 2.7 G/DL (ref 3.5–5)
ANION GAP SERPL CALC-SCNC: 8 MMOL/L (ref 5–15)
BASOPHILS # BLD: 0 K/UL (ref 0–0.1)
BASOPHILS NFR BLD: 0 % (ref 0–1)
BUN SERPL-MCNC: 23 MG/DL (ref 6–20)
BUN/CREAT SERPL: 19 (ref 12–20)
CA-I BLD-MCNC: 7.6 MG/DL (ref 8.5–10.1)
CHLORIDE SERPL-SCNC: 110 MMOL/L (ref 97–108)
CO2 SERPL-SCNC: 22 MMOL/L (ref 21–32)
CREAT SERPL-MCNC: 1.22 MG/DL (ref 0.55–1.02)
CRP SERPL-MCNC: 13.9 MG/DL (ref 0–0.6)
DIFFERENTIAL METHOD BLD: ABNORMAL
EOSINOPHIL # BLD: 0.3 K/UL (ref 0–0.4)
EOSINOPHIL NFR BLD: 2 % (ref 0–7)
ERYTHROCYTE [DISTWIDTH] IN BLOOD BY AUTOMATED COUNT: 25.1 % (ref 11.5–14.5)
GLUCOSE BLD STRIP.AUTO-MCNC: 161 MG/DL (ref 65–100)
GLUCOSE SERPL-MCNC: 119 MG/DL (ref 65–100)
HCT VFR BLD AUTO: 18.1 % (ref 35–47)
HGB BLD-MCNC: 5.8 G/DL (ref 11.5–16)
IMM GRANULOCYTES # BLD AUTO: 0.2 K/UL (ref 0–0.04)
IMM GRANULOCYTES NFR BLD AUTO: 1 % (ref 0–0.5)
LYMPHOCYTES # BLD: 1.7 K/UL (ref 0.8–3.5)
LYMPHOCYTES NFR BLD: 10 % (ref 12–49)
MCH RBC QN AUTO: 25.3 PG (ref 26–34)
MCHC RBC AUTO-ENTMCNC: 32 G/DL (ref 30–36.5)
MCV RBC AUTO: 79 FL (ref 80–99)
MONOCYTES # BLD: 0.5 K/UL (ref 0–1)
MONOCYTES NFR BLD: 3 % (ref 5–13)
NEUTS SEG # BLD: 13.9 K/UL (ref 1.8–8)
NEUTS SEG NFR BLD: 84 % (ref 32–75)
NRBC # BLD: 0.02 K/UL (ref 0–0.01)
NRBC BLD-RTO: 0.1 PER 100 WBC
PERFORMED BY:: ABNORMAL
PHOSPHATE SERPL-MCNC: 2 MG/DL (ref 2.6–4.7)
PLATELET # BLD AUTO: 244 K/UL (ref 150–400)
POTASSIUM SERPL-SCNC: 3.6 MMOL/L (ref 3.5–5.1)
PROCALCITONIN SERPL-MCNC: 1.7 NG/ML
RBC # BLD AUTO: 2.29 M/UL (ref 3.8–5.2)
RBC MORPH BLD: ABNORMAL
SODIUM SERPL-SCNC: 140 MMOL/L (ref 136–145)
WBC # BLD AUTO: 16.6 K/UL (ref 3.6–11)

## 2023-11-14 PROCEDURE — 82962 GLUCOSE BLOOD TEST: CPT

## 2023-11-14 PROCEDURE — 84145 PROCALCITONIN (PCT): CPT

## 2023-11-14 PROCEDURE — 6360000002 HC RX W HCPCS: Performed by: INTERNAL MEDICINE

## 2023-11-14 PROCEDURE — 99232 SBSQ HOSP IP/OBS MODERATE 35: CPT | Performed by: INTERNAL MEDICINE

## 2023-11-14 PROCEDURE — 6370000000 HC RX 637 (ALT 250 FOR IP): Performed by: INTERNAL MEDICINE

## 2023-11-14 PROCEDURE — 99232 SBSQ HOSP IP/OBS MODERATE 35: CPT | Performed by: PODIATRIST

## 2023-11-14 PROCEDURE — 2000000000 HC ICU R&B

## 2023-11-14 PROCEDURE — 86140 C-REACTIVE PROTEIN: CPT

## 2023-11-14 PROCEDURE — 2500000003 HC RX 250 WO HCPCS: Performed by: INTERNAL MEDICINE

## 2023-11-14 PROCEDURE — 85025 COMPLETE CBC W/AUTO DIFF WBC: CPT

## 2023-11-14 PROCEDURE — 36415 COLL VENOUS BLD VENIPUNCTURE: CPT

## 2023-11-14 PROCEDURE — 2580000003 HC RX 258: Performed by: INTERNAL MEDICINE

## 2023-11-14 PROCEDURE — 80069 RENAL FUNCTION PANEL: CPT

## 2023-11-14 RX ORDER — MIDODRINE HYDROCHLORIDE 5 MG/1
5 TABLET ORAL EVERY 8 HOURS
Status: DISCONTINUED | OUTPATIENT
Start: 2023-11-14 | End: 2023-12-01 | Stop reason: HOSPADM

## 2023-11-14 RX ADMIN — FOLIC ACID 1 MG: 1 TABLET ORAL at 10:01

## 2023-11-14 RX ADMIN — SODIUM CHLORIDE, PRESERVATIVE FREE 10 ML: 5 INJECTION INTRAVENOUS at 10:02

## 2023-11-14 RX ADMIN — PIPERACILLIN AND TAZOBACTAM 3375 MG: 3; .375 INJECTION, POWDER, LYOPHILIZED, FOR SOLUTION INTRAVENOUS at 16:35

## 2023-11-14 RX ADMIN — OXYCODONE HYDROCHLORIDE AND ACETAMINOPHEN 500 MG: 500 TABLET ORAL at 10:01

## 2023-11-14 RX ADMIN — ERYTHROPOIETIN 40000 UNITS: 40000 INJECTION, SOLUTION INTRAVENOUS; SUBCUTANEOUS at 11:37

## 2023-11-14 RX ADMIN — PIPERACILLIN AND TAZOBACTAM 3375 MG: 3; .375 INJECTION, POWDER, LYOPHILIZED, FOR SOLUTION INTRAVENOUS at 00:10

## 2023-11-14 RX ADMIN — ASPIRIN 81 MG: 81 TABLET, COATED ORAL at 10:01

## 2023-11-14 RX ADMIN — PIPERACILLIN AND TAZOBACTAM 3375 MG: 3; .375 INJECTION, POWDER, LYOPHILIZED, FOR SOLUTION INTRAVENOUS at 10:01

## 2023-11-14 RX ADMIN — MIDODRINE HYDROCHLORIDE 5 MG: 5 TABLET ORAL at 21:17

## 2023-11-14 RX ADMIN — ACETAMINOPHEN 650 MG: 325 TABLET ORAL at 02:00

## 2023-11-14 RX ADMIN — LEVOTHYROXINE SODIUM 25 MCG: 0.03 TABLET ORAL at 10:01

## 2023-11-14 RX ADMIN — CYANOCOBALAMIN TAB 1000 MCG 1000 MCG: 1000 TAB at 10:01

## 2023-11-14 RX ADMIN — HYDROMORPHONE HYDROCHLORIDE 0.25 MG: 1 INJECTION, SOLUTION INTRAMUSCULAR; INTRAVENOUS; SUBCUTANEOUS at 01:16

## 2023-11-14 RX ADMIN — CASTOR OIL AND BALSAM, PERU: 788; 87 OINTMENT TOPICAL at 21:13

## 2023-11-14 RX ADMIN — SODIUM CHLORIDE, PRESERVATIVE FREE 10 ML: 5 INJECTION INTRAVENOUS at 21:14

## 2023-11-14 RX ADMIN — CASTOR OIL AND BALSAM, PERU: 788; 87 OINTMENT TOPICAL at 10:05

## 2023-11-14 ASSESSMENT — PAIN SCALES - PAIN ASSESSMENT IN ADVANCED DEMENTIA (PAINAD)
FACIALEXPRESSION: 0
TOTALSCORE: 0
NEGVOCALIZATION: 0
BODYLANGUAGE: 0
TOTALSCORE: 0
FACIALEXPRESSION: 0
BREATHING: 0
NEGVOCALIZATION: 0
CONSOLABILITY: 0
NEGVOCALIZATION: 0
FACIALEXPRESSION: 0
CONSOLABILITY: 0
CONSOLABILITY: 0
BODYLANGUAGE: 0
BREATHING: 0
TOTALSCORE: 0
BREATHING: 0
BODYLANGUAGE: 0

## 2023-11-14 ASSESSMENT — PAIN SCALES - GENERAL
PAINLEVEL_OUTOF10: 0
PAINLEVEL_OUTOF10: 0

## 2023-11-15 PROBLEM — D64.9 ANEMIA: Status: ACTIVE | Noted: 2023-11-15

## 2023-11-15 LAB
ALBUMIN SERPL-MCNC: 2 G/DL (ref 3.5–5)
ANION GAP SERPL CALC-SCNC: 6 MMOL/L (ref 5–15)
BASOPHILS # BLD: 0 K/UL (ref 0–0.1)
BASOPHILS NFR BLD: 0 % (ref 0–1)
BUN SERPL-MCNC: 23 MG/DL (ref 6–20)
BUN/CREAT SERPL: 19 (ref 12–20)
CA-I BLD-MCNC: 7.5 MG/DL (ref 8.5–10.1)
CHLORIDE SERPL-SCNC: 110 MMOL/L (ref 97–108)
CO2 SERPL-SCNC: 24 MMOL/L (ref 21–32)
CREAT SERPL-MCNC: 1.21 MG/DL (ref 0.55–1.02)
DIFFERENTIAL METHOD BLD: ABNORMAL
EOSINOPHIL # BLD: 0.4 K/UL (ref 0–0.4)
EOSINOPHIL NFR BLD: 3 % (ref 0–7)
ERYTHROCYTE [DISTWIDTH] IN BLOOD BY AUTOMATED COUNT: 25.1 % (ref 11.5–14.5)
GLUCOSE BLD STRIP.AUTO-MCNC: 127 MG/DL (ref 65–100)
GLUCOSE BLD STRIP.AUTO-MCNC: 135 MG/DL (ref 65–100)
GLUCOSE BLD STRIP.AUTO-MCNC: 142 MG/DL (ref 65–100)
GLUCOSE BLD STRIP.AUTO-MCNC: 91 MG/DL (ref 65–100)
GLUCOSE SERPL-MCNC: 117 MG/DL (ref 65–100)
HCT VFR BLD AUTO: 16.3 % (ref 35–47)
HGB BLD-MCNC: 5.2 G/DL (ref 11.5–16)
IMM GRANULOCYTES # BLD AUTO: 0.1 K/UL (ref 0–0.04)
IMM GRANULOCYTES NFR BLD AUTO: 1 % (ref 0–0.5)
LYMPHOCYTES # BLD: 1.1 K/UL (ref 0.8–3.5)
LYMPHOCYTES NFR BLD: 9 % (ref 12–49)
MCH RBC QN AUTO: 25.5 PG (ref 26–34)
MCHC RBC AUTO-ENTMCNC: 31.9 G/DL (ref 30–36.5)
MCV RBC AUTO: 79.9 FL (ref 80–99)
MONOCYTES # BLD: 0.4 K/UL (ref 0–1)
MONOCYTES NFR BLD: 3 % (ref 5–13)
NEUTS SEG # BLD: 9.7 K/UL (ref 1.8–8)
NEUTS SEG NFR BLD: 84 % (ref 32–75)
NRBC # BLD: 0 K/UL (ref 0–0.01)
NRBC BLD-RTO: 0 PER 100 WBC
PERFORMED BY:: ABNORMAL
PERFORMED BY:: NORMAL
PHOSPHATE SERPL-MCNC: 2.5 MG/DL (ref 2.6–4.7)
PLATELET # BLD AUTO: 166 K/UL (ref 150–400)
POTASSIUM SERPL-SCNC: 3.9 MMOL/L (ref 3.5–5.1)
RBC # BLD AUTO: 2.04 M/UL (ref 3.8–5.2)
RBC MORPH BLD: ABNORMAL
RBC MORPH BLD: ABNORMAL
SODIUM SERPL-SCNC: 140 MMOL/L (ref 136–145)
WBC # BLD AUTO: 11.7 K/UL (ref 3.6–11)

## 2023-11-15 PROCEDURE — 2580000003 HC RX 258: Performed by: INTERNAL MEDICINE

## 2023-11-15 PROCEDURE — 6370000000 HC RX 637 (ALT 250 FOR IP): Performed by: INTERNAL MEDICINE

## 2023-11-15 PROCEDURE — 1100000000 HC RM PRIVATE

## 2023-11-15 PROCEDURE — 36415 COLL VENOUS BLD VENIPUNCTURE: CPT

## 2023-11-15 PROCEDURE — 6360000002 HC RX W HCPCS: Performed by: INTERNAL MEDICINE

## 2023-11-15 PROCEDURE — 99232 SBSQ HOSP IP/OBS MODERATE 35: CPT | Performed by: PODIATRIST

## 2023-11-15 PROCEDURE — 99232 SBSQ HOSP IP/OBS MODERATE 35: CPT | Performed by: INTERNAL MEDICINE

## 2023-11-15 PROCEDURE — 2500000003 HC RX 250 WO HCPCS: Performed by: INTERNAL MEDICINE

## 2023-11-15 PROCEDURE — 85025 COMPLETE CBC W/AUTO DIFF WBC: CPT

## 2023-11-15 PROCEDURE — 80069 RENAL FUNCTION PANEL: CPT

## 2023-11-15 PROCEDURE — 82962 GLUCOSE BLOOD TEST: CPT

## 2023-11-15 RX ADMIN — MIDODRINE HYDROCHLORIDE 5 MG: 5 TABLET ORAL at 21:04

## 2023-11-15 RX ADMIN — IRON SUCROSE 100 MG: 20 INJECTION, SOLUTION INTRAVENOUS at 15:07

## 2023-11-15 RX ADMIN — SODIUM CHLORIDE, PRESERVATIVE FREE 10 ML: 5 INJECTION INTRAVENOUS at 08:31

## 2023-11-15 RX ADMIN — MIDODRINE HYDROCHLORIDE 5 MG: 5 TABLET ORAL at 15:07

## 2023-11-15 RX ADMIN — CASTOR OIL AND BALSAM, PERU: 788; 87 OINTMENT TOPICAL at 08:30

## 2023-11-15 RX ADMIN — CASTOR OIL AND BALSAM, PERU: 788; 87 OINTMENT TOPICAL at 21:04

## 2023-11-15 RX ADMIN — PIPERACILLIN AND TAZOBACTAM 3375 MG: 3; .375 INJECTION, POWDER, LYOPHILIZED, FOR SOLUTION INTRAVENOUS at 00:01

## 2023-11-15 RX ADMIN — LEVOTHYROXINE SODIUM 25 MCG: 0.03 TABLET ORAL at 08:15

## 2023-11-15 RX ADMIN — SODIUM CHLORIDE, PRESERVATIVE FREE 10 ML: 5 INJECTION INTRAVENOUS at 21:05

## 2023-11-15 RX ADMIN — PIPERACILLIN AND TAZOBACTAM 3375 MG: 3; .375 INJECTION, POWDER, LYOPHILIZED, FOR SOLUTION INTRAVENOUS at 08:15

## 2023-11-15 RX ADMIN — OXYCODONE HYDROCHLORIDE AND ACETAMINOPHEN 500 MG: 500 TABLET ORAL at 08:15

## 2023-11-15 RX ADMIN — MIDODRINE HYDROCHLORIDE 5 MG: 5 TABLET ORAL at 04:47

## 2023-11-15 RX ADMIN — ASPIRIN 81 MG: 81 TABLET, COATED ORAL at 08:15

## 2023-11-15 RX ADMIN — FOLIC ACID 1 MG: 1 TABLET ORAL at 08:15

## 2023-11-15 RX ADMIN — CYANOCOBALAMIN TAB 1000 MCG 1000 MCG: 1000 TAB at 08:15

## 2023-11-15 RX ADMIN — PIPERACILLIN AND TAZOBACTAM 3375 MG: 3; .375 INJECTION, POWDER, LYOPHILIZED, FOR SOLUTION INTRAVENOUS at 16:42

## 2023-11-15 RX ADMIN — ACETAMINOPHEN 650 MG: 325 TABLET ORAL at 00:05

## 2023-11-15 RX ADMIN — HYDROMORPHONE HYDROCHLORIDE 0.25 MG: 1 INJECTION, SOLUTION INTRAMUSCULAR; INTRAVENOUS; SUBCUTANEOUS at 04:44

## 2023-11-15 ASSESSMENT — PAIN SCALES - PAIN ASSESSMENT IN ADVANCED DEMENTIA (PAINAD)
BREATHING: 0
BREATHING: 0
BODYLANGUAGE: 0
TOTALSCORE: 0
NEGVOCALIZATION: 0
BODYLANGUAGE: 0
FACIALEXPRESSION: 0
CONSOLABILITY: 0
TOTALSCORE: 0
FACIALEXPRESSION: 0
CONSOLABILITY: 0
NEGVOCALIZATION: 0

## 2023-11-15 ASSESSMENT — PAIN SCALES - GENERAL
PAINLEVEL_OUTOF10: 0
PAINLEVEL_OUTOF10: 0

## 2023-11-15 NOTE — CARE COORDINATION
Transfer orders out of ICU to [address] [place] [address],[address] [place]. Pending PT/OT eval for therapy recommendation. Open w/[facility] agency prior to inpatient admission.        Lazaro Weinstein, ARLENE

## 2023-11-16 LAB
GLUCOSE BLD STRIP.AUTO-MCNC: 119 MG/DL (ref 65–100)
GLUCOSE BLD STRIP.AUTO-MCNC: 129 MG/DL (ref 65–100)
GLUCOSE BLD STRIP.AUTO-MCNC: 133 MG/DL (ref 65–100)
GLUCOSE BLD STRIP.AUTO-MCNC: 149 MG/DL (ref 65–100)
PERFORMED BY:: ABNORMAL

## 2023-11-16 PROCEDURE — 2580000003 HC RX 258: Performed by: INTERNAL MEDICINE

## 2023-11-16 PROCEDURE — 6360000002 HC RX W HCPCS: Performed by: INTERNAL MEDICINE

## 2023-11-16 PROCEDURE — 99232 SBSQ HOSP IP/OBS MODERATE 35: CPT | Performed by: INTERNAL MEDICINE

## 2023-11-16 PROCEDURE — 1100000000 HC RM PRIVATE

## 2023-11-16 PROCEDURE — 6370000000 HC RX 637 (ALT 250 FOR IP): Performed by: INTERNAL MEDICINE

## 2023-11-16 PROCEDURE — 82962 GLUCOSE BLOOD TEST: CPT

## 2023-11-16 RX ADMIN — FOLIC ACID 1 MG: 1 TABLET ORAL at 10:10

## 2023-11-16 RX ADMIN — CASTOR OIL AND BALSAM, PERU: 788; 87 OINTMENT TOPICAL at 10:10

## 2023-11-16 RX ADMIN — MIDODRINE HYDROCHLORIDE 5 MG: 5 TABLET ORAL at 05:30

## 2023-11-16 RX ADMIN — LEVOTHYROXINE SODIUM 25 MCG: 0.03 TABLET ORAL at 05:30

## 2023-11-16 RX ADMIN — ASPIRIN 81 MG: 81 TABLET, COATED ORAL at 10:10

## 2023-11-16 RX ADMIN — MIDODRINE HYDROCHLORIDE 5 MG: 5 TABLET ORAL at 13:17

## 2023-11-16 RX ADMIN — CASTOR OIL AND BALSAM, PERU: 788; 87 OINTMENT TOPICAL at 20:21

## 2023-11-16 RX ADMIN — PIPERACILLIN AND TAZOBACTAM 3375 MG: 3; .375 INJECTION, POWDER, LYOPHILIZED, FOR SOLUTION INTRAVENOUS at 16:25

## 2023-11-16 RX ADMIN — CYANOCOBALAMIN TAB 1000 MCG 1000 MCG: 1000 TAB at 10:10

## 2023-11-16 RX ADMIN — ERYTHROPOIETIN 40000 UNITS: 40000 INJECTION, SOLUTION INTRAVENOUS; SUBCUTANEOUS at 10:41

## 2023-11-16 RX ADMIN — SODIUM CHLORIDE, PRESERVATIVE FREE 10 ML: 5 INJECTION INTRAVENOUS at 10:11

## 2023-11-16 RX ADMIN — OXYCODONE HYDROCHLORIDE AND ACETAMINOPHEN 500 MG: 500 TABLET ORAL at 10:10

## 2023-11-16 RX ADMIN — SODIUM CHLORIDE, PRESERVATIVE FREE 10 ML: 5 INJECTION INTRAVENOUS at 20:21

## 2023-11-16 ASSESSMENT — PAIN SCALES - GENERAL
PAINLEVEL_OUTOF10: 0
PAINLEVEL_OUTOF10: 0

## 2023-11-16 NOTE — WOUND CARE
Requested delivery of a low air loss surface from [facility], confirmation [id number]. Bed should deliver this afternoon and patient will need to be transferred onto the bed.   Informed Mona Avila.

## 2023-11-17 PROBLEM — L97.512 ULCER OF RIGHT FOOT WITH FAT LAYER EXPOSED (HCC): Status: ACTIVE | Noted: 2023-11-09

## 2023-11-17 LAB
ALBUMIN SERPL-MCNC: 2.3 G/DL (ref 3.5–5)
ANION GAP SERPL CALC-SCNC: 6 MMOL/L (ref 5–15)
BASOPHILS # BLD: 0 K/UL (ref 0–0.1)
BASOPHILS NFR BLD: 0 % (ref 0–1)
BUN SERPL-MCNC: 28 MG/DL (ref 6–20)
BUN/CREAT SERPL: 27 (ref 12–20)
CA-I BLD-MCNC: 8.4 MG/DL (ref 8.5–10.1)
CHLORIDE SERPL-SCNC: 106 MMOL/L (ref 97–108)
CO2 SERPL-SCNC: 24 MMOL/L (ref 21–32)
CREAT SERPL-MCNC: 1.04 MG/DL (ref 0.55–1.02)
CRP SERPL-MCNC: 13.4 MG/DL (ref 0–0.6)
DIFFERENTIAL METHOD BLD: ABNORMAL
EOSINOPHIL # BLD: 0.1 K/UL (ref 0–0.4)
EOSINOPHIL NFR BLD: 1 % (ref 0–7)
ERYTHROCYTE [DISTWIDTH] IN BLOOD BY AUTOMATED COUNT: 23.7 % (ref 11.5–14.5)
GLUCOSE BLD STRIP.AUTO-MCNC: 120 MG/DL (ref 65–100)
GLUCOSE BLD STRIP.AUTO-MCNC: 161 MG/DL (ref 65–100)
GLUCOSE BLD STRIP.AUTO-MCNC: 91 MG/DL (ref 65–100)
GLUCOSE BLD STRIP.AUTO-MCNC: 98 MG/DL (ref 65–100)
GLUCOSE SERPL-MCNC: 121 MG/DL (ref 65–100)
HCT VFR BLD AUTO: 21.4 % (ref 35–47)
HGB BLD-MCNC: 6.5 G/DL (ref 11.5–16)
IMM GRANULOCYTES # BLD AUTO: 0.2 K/UL (ref 0–0.04)
IMM GRANULOCYTES NFR BLD AUTO: 2 % (ref 0–0.5)
LYMPHOCYTES # BLD: 0.9 K/UL (ref 0.8–3.5)
LYMPHOCYTES NFR BLD: 8 % (ref 12–49)
MCH RBC QN AUTO: 26 PG (ref 26–34)
MCHC RBC AUTO-ENTMCNC: 30.4 G/DL (ref 30–36.5)
MCV RBC AUTO: 85.6 FL (ref 80–99)
MONOCYTES # BLD: 0.4 K/UL (ref 0–1)
MONOCYTES NFR BLD: 4 % (ref 5–13)
NEUTS SEG # BLD: 9.2 K/UL (ref 1.8–8)
NEUTS SEG NFR BLD: 85 % (ref 32–75)
NRBC # BLD: 0.05 K/UL (ref 0–0.01)
NRBC BLD-RTO: 0.5 PER 100 WBC
PERFORMED BY:: ABNORMAL
PERFORMED BY:: ABNORMAL
PERFORMED BY:: NORMAL
PERFORMED BY:: NORMAL
PHOSPHATE SERPL-MCNC: 2.9 MG/DL (ref 2.6–4.7)
PLATELET # BLD AUTO: 238 K/UL (ref 150–400)
PLATELET COMMENT: ABNORMAL
POTASSIUM SERPL-SCNC: 4.3 MMOL/L (ref 3.5–5.1)
PROCALCITONIN SERPL-MCNC: 2.33 NG/ML
RBC # BLD AUTO: 2.5 M/UL (ref 3.8–5.2)
RBC MORPH BLD: ABNORMAL
SODIUM SERPL-SCNC: 136 MMOL/L (ref 136–145)
WBC # BLD AUTO: 10.8 K/UL (ref 3.6–11)

## 2023-11-17 PROCEDURE — 86140 C-REACTIVE PROTEIN: CPT

## 2023-11-17 PROCEDURE — 80069 RENAL FUNCTION PANEL: CPT

## 2023-11-17 PROCEDURE — 92610 EVALUATE SWALLOWING FUNCTION: CPT

## 2023-11-17 PROCEDURE — 11042 DBRDMT SUBQ TIS 1ST 20SQCM/<: CPT | Performed by: PODIATRIST

## 2023-11-17 PROCEDURE — 85025 COMPLETE CBC W/AUTO DIFF WBC: CPT

## 2023-11-17 PROCEDURE — 6360000002 HC RX W HCPCS: Performed by: INTERNAL MEDICINE

## 2023-11-17 PROCEDURE — 82962 GLUCOSE BLOOD TEST: CPT

## 2023-11-17 PROCEDURE — 1100000000 HC RM PRIVATE

## 2023-11-17 PROCEDURE — 6370000000 HC RX 637 (ALT 250 FOR IP): Performed by: INTERNAL MEDICINE

## 2023-11-17 PROCEDURE — 2580000003 HC RX 258: Performed by: INTERNAL MEDICINE

## 2023-11-17 PROCEDURE — 99232 SBSQ HOSP IP/OBS MODERATE 35: CPT | Performed by: INTERNAL MEDICINE

## 2023-11-17 PROCEDURE — 84145 PROCALCITONIN (PCT): CPT

## 2023-11-17 PROCEDURE — 99222 1ST HOSP IP/OBS MODERATE 55: CPT | Performed by: PODIATRIST

## 2023-11-17 PROCEDURE — 36415 COLL VENOUS BLD VENIPUNCTURE: CPT

## 2023-11-17 RX ORDER — OXYCODONE HYDROCHLORIDE AND ACETAMINOPHEN 5; 325 MG/1; MG/1
1 TABLET ORAL EVERY 4 HOURS PRN
Status: DISCONTINUED | OUTPATIENT
Start: 2023-11-17 | End: 2023-12-01 | Stop reason: HOSPADM

## 2023-11-17 RX ORDER — CASTOR OIL AND BALSAM, PERU 788; 87 MG/G; MG/G
OINTMENT TOPICAL DAILY
Status: DISCONTINUED | OUTPATIENT
Start: 2023-11-17 | End: 2023-12-01 | Stop reason: HOSPADM

## 2023-11-17 RX ADMIN — MIDODRINE HYDROCHLORIDE 5 MG: 5 TABLET ORAL at 12:35

## 2023-11-17 RX ADMIN — IRON SUCROSE 100 MG: 20 INJECTION, SOLUTION INTRAVENOUS at 12:36

## 2023-11-17 RX ADMIN — PIPERACILLIN AND TAZOBACTAM 3375 MG: 3; .375 INJECTION, POWDER, LYOPHILIZED, FOR SOLUTION INTRAVENOUS at 06:13

## 2023-11-17 RX ADMIN — ERYTHROPOIETIN 40000 UNITS: 40000 INJECTION, SOLUTION INTRAVENOUS; SUBCUTANEOUS at 11:12

## 2023-11-17 RX ADMIN — PIPERACILLIN AND TAZOBACTAM 3375 MG: 3; .375 INJECTION, POWDER, LYOPHILIZED, FOR SOLUTION INTRAVENOUS at 15:00

## 2023-11-17 RX ADMIN — PIPERACILLIN AND TAZOBACTAM 3375 MG: 3; .375 INJECTION, POWDER, LYOPHILIZED, FOR SOLUTION INTRAVENOUS at 00:05

## 2023-11-17 RX ADMIN — ACETAMINOPHEN 650 MG: 325 TABLET ORAL at 14:02

## 2023-11-17 RX ADMIN — MIDODRINE HYDROCHLORIDE 5 MG: 5 TABLET ORAL at 05:53

## 2023-11-17 RX ADMIN — ASPIRIN 81 MG: 81 TABLET, COATED ORAL at 10:15

## 2023-11-17 RX ADMIN — SODIUM CHLORIDE, PRESERVATIVE FREE 10 ML: 5 INJECTION INTRAVENOUS at 10:18

## 2023-11-17 RX ADMIN — CASTOR OIL AND BALSAM, PERU: 788; 87 OINTMENT TOPICAL at 10:15

## 2023-11-17 RX ADMIN — FOLIC ACID 1 MG: 1 TABLET ORAL at 10:15

## 2023-11-17 RX ADMIN — LEVOTHYROXINE SODIUM 25 MCG: 0.03 TABLET ORAL at 05:53

## 2023-11-17 RX ADMIN — MIDODRINE HYDROCHLORIDE 5 MG: 5 TABLET ORAL at 21:41

## 2023-11-17 RX ADMIN — CYANOCOBALAMIN TAB 1000 MCG 1000 MCG: 1000 TAB at 10:15

## 2023-11-17 RX ADMIN — SODIUM CHLORIDE, PRESERVATIVE FREE 10 ML: 5 INJECTION INTRAVENOUS at 21:09

## 2023-11-17 RX ADMIN — PIPERACILLIN AND TAZOBACTAM 3375 MG: 3; .375 INJECTION, POWDER, LYOPHILIZED, FOR SOLUTION INTRAVENOUS at 23:58

## 2023-11-17 RX ADMIN — OXYCODONE HYDROCHLORIDE AND ACETAMINOPHEN 500 MG: 500 TABLET ORAL at 10:15

## 2023-11-17 ASSESSMENT — PAIN - FUNCTIONAL ASSESSMENT: PAIN_FUNCTIONAL_ASSESSMENT: ACTIVITIES ARE NOT PREVENTED

## 2023-11-17 ASSESSMENT — PAIN DESCRIPTION - ORIENTATION: ORIENTATION: LEFT

## 2023-11-17 ASSESSMENT — PAIN SCALES - WONG BAKER: WONGBAKER_NUMERICALRESPONSE: 0

## 2023-11-17 ASSESSMENT — PAIN DESCRIPTION - LOCATION: LOCATION: LEG

## 2023-11-17 ASSESSMENT — PAIN SCALES - GENERAL
PAINLEVEL_OUTOF10: 0
PAINLEVEL_OUTOF10: 5

## 2023-11-17 ASSESSMENT — PAIN DESCRIPTION - DESCRIPTORS: DESCRIPTORS: ACHING

## 2023-11-18 LAB
GLUCOSE BLD STRIP.AUTO-MCNC: 85 MG/DL (ref 65–100)
PERFORMED BY:: NORMAL

## 2023-11-18 PROCEDURE — 6370000000 HC RX 637 (ALT 250 FOR IP): Performed by: INTERNAL MEDICINE

## 2023-11-18 PROCEDURE — 2580000003 HC RX 258: Performed by: INTERNAL MEDICINE

## 2023-11-18 PROCEDURE — 99232 SBSQ HOSP IP/OBS MODERATE 35: CPT | Performed by: INTERNAL MEDICINE

## 2023-11-18 PROCEDURE — 6370000000 HC RX 637 (ALT 250 FOR IP): Performed by: FAMILY MEDICINE

## 2023-11-18 PROCEDURE — 1100000000 HC RM PRIVATE

## 2023-11-18 PROCEDURE — 6360000002 HC RX W HCPCS: Performed by: INTERNAL MEDICINE

## 2023-11-18 RX ADMIN — OXYCODONE HYDROCHLORIDE AND ACETAMINOPHEN 500 MG: 500 TABLET ORAL at 09:06

## 2023-11-18 RX ADMIN — FOLIC ACID 1 MG: 1 TABLET ORAL at 09:06

## 2023-11-18 RX ADMIN — OXYCODONE HYDROCHLORIDE AND ACETAMINOPHEN 1 TABLET: 5; 325 TABLET ORAL at 00:18

## 2023-11-18 RX ADMIN — OXYCODONE HYDROCHLORIDE AND ACETAMINOPHEN 1 TABLET: 5; 325 TABLET ORAL at 05:04

## 2023-11-18 RX ADMIN — CASTOR OIL AND BALSAM, PERU: 788; 87 OINTMENT TOPICAL at 23:00

## 2023-11-18 RX ADMIN — ERYTHROPOIETIN 40000 UNITS: 40000 INJECTION, SOLUTION INTRAVENOUS; SUBCUTANEOUS at 09:06

## 2023-11-18 RX ADMIN — MIDODRINE HYDROCHLORIDE 5 MG: 5 TABLET ORAL at 14:30

## 2023-11-18 RX ADMIN — PIPERACILLIN AND TAZOBACTAM 3375 MG: 3; .375 INJECTION, POWDER, LYOPHILIZED, FOR SOLUTION INTRAVENOUS at 06:13

## 2023-11-18 RX ADMIN — ASPIRIN 81 MG: 81 TABLET, COATED ORAL at 09:06

## 2023-11-18 RX ADMIN — CYANOCOBALAMIN TAB 1000 MCG 1000 MCG: 1000 TAB at 09:06

## 2023-11-18 RX ADMIN — PIPERACILLIN AND TAZOBACTAM 3375 MG: 3; .375 INJECTION, POWDER, LYOPHILIZED, FOR SOLUTION INTRAVENOUS at 15:49

## 2023-11-18 RX ADMIN — LEVOTHYROXINE SODIUM 25 MCG: 0.03 TABLET ORAL at 05:04

## 2023-11-18 RX ADMIN — SODIUM CHLORIDE, PRESERVATIVE FREE 10 ML: 5 INJECTION INTRAVENOUS at 21:00

## 2023-11-18 RX ADMIN — MIDODRINE HYDROCHLORIDE 5 MG: 5 TABLET ORAL at 05:04

## 2023-11-18 ASSESSMENT — PAIN DESCRIPTION - DESCRIPTORS
DESCRIPTORS: ACHING;DISCOMFORT;SORE
DESCRIPTORS: ACHING;DISCOMFORT;SORE;NAGGING

## 2023-11-18 ASSESSMENT — PAIN SCALES - GENERAL
PAINLEVEL_OUTOF10: 2
PAINLEVEL_OUTOF10: 7
PAINLEVEL_OUTOF10: 7
PAINLEVEL_OUTOF10: 2

## 2023-11-18 ASSESSMENT — PAIN - FUNCTIONAL ASSESSMENT
PAIN_FUNCTIONAL_ASSESSMENT: PREVENTS OR INTERFERES SOME ACTIVE ACTIVITIES AND ADLS
PAIN_FUNCTIONAL_ASSESSMENT: PREVENTS OR INTERFERES SOME ACTIVE ACTIVITIES AND ADLS

## 2023-11-18 ASSESSMENT — PAIN DESCRIPTION - LOCATION
LOCATION: GENERALIZED
LOCATION: GENERALIZED

## 2023-11-18 ASSESSMENT — PAIN DESCRIPTION - ORIENTATION
ORIENTATION: OTHER (COMMENT)
ORIENTATION: OTHER (COMMENT)

## 2023-11-19 LAB
ALBUMIN SERPL-MCNC: 2.2 G/DL (ref 3.5–5)
ALBUMIN/GLOB SERPL: 0.6 (ref 1.1–2.2)
ALP SERPL-CCNC: 145 U/L (ref 45–117)
ALT SERPL-CCNC: 15 U/L (ref 12–78)
ANION GAP SERPL CALC-SCNC: 8 MMOL/L (ref 5–15)
AST SERPL W P-5'-P-CCNC: 19 U/L (ref 15–37)
BILIRUB SERPL-MCNC: 0.6 MG/DL (ref 0.2–1)
BUN SERPL-MCNC: 24 MG/DL (ref 6–20)
BUN/CREAT SERPL: 20 (ref 12–20)
CA-I BLD-MCNC: 8.8 MG/DL (ref 8.5–10.1)
CHLORIDE SERPL-SCNC: 104 MMOL/L (ref 97–108)
CO2 SERPL-SCNC: 22 MMOL/L (ref 21–32)
CREAT SERPL-MCNC: 1.18 MG/DL (ref 0.55–1.02)
ERYTHROCYTE [DISTWIDTH] IN BLOOD BY AUTOMATED COUNT: 23.1 % (ref 11.5–14.5)
GLOBULIN SER CALC-MCNC: 4 G/DL (ref 2–4)
GLUCOSE BLD STRIP.AUTO-MCNC: 101 MG/DL (ref 65–100)
GLUCOSE BLD STRIP.AUTO-MCNC: 67 MG/DL (ref 65–100)
GLUCOSE BLD STRIP.AUTO-MCNC: 73 MG/DL (ref 65–100)
GLUCOSE BLD STRIP.AUTO-MCNC: 86 MG/DL (ref 65–100)
GLUCOSE SERPL-MCNC: 73 MG/DL (ref 65–100)
HCT VFR BLD AUTO: 22.9 % (ref 35–47)
HGB BLD-MCNC: 7 G/DL (ref 11.5–16)
MAGNESIUM SERPL-MCNC: 1.8 MG/DL (ref 1.6–2.4)
MCH RBC QN AUTO: 26.7 PG (ref 26–34)
MCHC RBC AUTO-ENTMCNC: 30.6 G/DL (ref 30–36.5)
MCV RBC AUTO: 87.4 FL (ref 80–99)
NRBC # BLD: 0.11 K/UL (ref 0–0.01)
NRBC BLD-RTO: 1.1 PER 100 WBC
PERFORMED BY:: ABNORMAL
PERFORMED BY:: NORMAL
PLATELET # BLD AUTO: 735 K/UL (ref 150–400)
POTASSIUM SERPL-SCNC: 4.5 MMOL/L (ref 3.5–5.1)
PROT SERPL-MCNC: 6.2 G/DL (ref 6.4–8.2)
RBC # BLD AUTO: 2.62 M/UL (ref 3.8–5.2)
SODIUM SERPL-SCNC: 134 MMOL/L (ref 136–145)
WBC # BLD AUTO: 9.6 K/UL (ref 3.6–11)

## 2023-11-19 PROCEDURE — 1100000000 HC RM PRIVATE

## 2023-11-19 PROCEDURE — 6370000000 HC RX 637 (ALT 250 FOR IP): Performed by: INTERNAL MEDICINE

## 2023-11-19 PROCEDURE — 80053 COMPREHEN METABOLIC PANEL: CPT

## 2023-11-19 PROCEDURE — 6360000002 HC RX W HCPCS: Performed by: INTERNAL MEDICINE

## 2023-11-19 PROCEDURE — 83735 ASSAY OF MAGNESIUM: CPT

## 2023-11-19 PROCEDURE — 2580000003 HC RX 258: Performed by: INTERNAL MEDICINE

## 2023-11-19 PROCEDURE — 36415 COLL VENOUS BLD VENIPUNCTURE: CPT

## 2023-11-19 PROCEDURE — 99232 SBSQ HOSP IP/OBS MODERATE 35: CPT | Performed by: INTERNAL MEDICINE

## 2023-11-19 PROCEDURE — 82962 GLUCOSE BLOOD TEST: CPT

## 2023-11-19 PROCEDURE — 85027 COMPLETE CBC AUTOMATED: CPT

## 2023-11-19 RX ADMIN — PIPERACILLIN AND TAZOBACTAM 3375 MG: 3; .375 INJECTION, POWDER, LYOPHILIZED, FOR SOLUTION INTRAVENOUS at 17:06

## 2023-11-19 RX ADMIN — LEVOTHYROXINE SODIUM 25 MCG: 0.03 TABLET ORAL at 05:38

## 2023-11-19 RX ADMIN — OXYCODONE HYDROCHLORIDE AND ACETAMINOPHEN 500 MG: 500 TABLET ORAL at 09:25

## 2023-11-19 RX ADMIN — PIPERACILLIN AND TAZOBACTAM 3375 MG: 3; .375 INJECTION, POWDER, LYOPHILIZED, FOR SOLUTION INTRAVENOUS at 00:01

## 2023-11-19 RX ADMIN — FOLIC ACID 1 MG: 1 TABLET ORAL at 09:25

## 2023-11-19 RX ADMIN — PIPERACILLIN AND TAZOBACTAM 3375 MG: 3; .375 INJECTION, POWDER, LYOPHILIZED, FOR SOLUTION INTRAVENOUS at 09:25

## 2023-11-19 RX ADMIN — ASPIRIN 81 MG: 81 TABLET, COATED ORAL at 09:25

## 2023-11-19 RX ADMIN — CYANOCOBALAMIN TAB 1000 MCG 1000 MCG: 1000 TAB at 09:25

## 2023-11-19 RX ADMIN — CASTOR OIL AND BALSAM, PERU: 788; 87 OINTMENT TOPICAL at 09:29

## 2023-11-19 RX ADMIN — IRON SUCROSE 100 MG: 20 INJECTION, SOLUTION INTRAVENOUS at 14:30

## 2023-11-19 RX ADMIN — ERYTHROPOIETIN 40000 UNITS: 40000 INJECTION, SOLUTION INTRAVENOUS; SUBCUTANEOUS at 12:11

## 2023-11-20 LAB
ALBUMIN SERPL-MCNC: 2.1 G/DL (ref 3.5–5)
ALBUMIN/GLOB SERPL: 0.5 (ref 1.1–2.2)
ALP SERPL-CCNC: 132 U/L (ref 45–117)
ALT SERPL-CCNC: 14 U/L (ref 12–78)
ANION GAP SERPL CALC-SCNC: 8 MMOL/L (ref 5–15)
AST SERPL W P-5'-P-CCNC: 18 U/L (ref 15–37)
BILIRUB SERPL-MCNC: 0.6 MG/DL (ref 0.2–1)
BUN SERPL-MCNC: 21 MG/DL (ref 6–20)
BUN/CREAT SERPL: 18 (ref 12–20)
CA-I BLD-MCNC: 8.7 MG/DL (ref 8.5–10.1)
CHLORIDE SERPL-SCNC: 106 MMOL/L (ref 97–108)
CO2 SERPL-SCNC: 22 MMOL/L (ref 21–32)
CREAT SERPL-MCNC: 1.19 MG/DL (ref 0.55–1.02)
ERYTHROCYTE [DISTWIDTH] IN BLOOD BY AUTOMATED COUNT: 24.2 % (ref 11.5–14.5)
GLOBULIN SER CALC-MCNC: 4.2 G/DL (ref 2–4)
GLUCOSE BLD STRIP.AUTO-MCNC: 78 MG/DL (ref 65–100)
GLUCOSE BLD STRIP.AUTO-MCNC: 97 MG/DL (ref 65–100)
GLUCOSE SERPL-MCNC: 67 MG/DL (ref 65–100)
HCT VFR BLD AUTO: 20.1 % (ref 35–47)
HGB BLD-MCNC: 6.3 G/DL (ref 11.5–16)
MAGNESIUM SERPL-MCNC: 1.8 MG/DL (ref 1.6–2.4)
MCH RBC QN AUTO: 26.4 PG (ref 26–34)
MCHC RBC AUTO-ENTMCNC: 31.3 G/DL (ref 30–36.5)
MCV RBC AUTO: 84.1 FL (ref 80–99)
NRBC # BLD: 0.21 K/UL (ref 0–0.01)
NRBC BLD-RTO: 2.4 PER 100 WBC
PERFORMED BY:: NORMAL
PERFORMED BY:: NORMAL
PLATELET # BLD AUTO: 547 K/UL (ref 150–400)
PMV BLD AUTO: 13.3 FL (ref 8.9–12.9)
POTASSIUM SERPL-SCNC: 4.6 MMOL/L (ref 3.5–5.1)
PROT SERPL-MCNC: 6.3 G/DL (ref 6.4–8.2)
RBC # BLD AUTO: 2.39 M/UL (ref 3.8–5.2)
SODIUM SERPL-SCNC: 136 MMOL/L (ref 136–145)
WBC # BLD AUTO: 8.8 K/UL (ref 3.6–11)

## 2023-11-20 PROCEDURE — 1100000000 HC RM PRIVATE

## 2023-11-20 PROCEDURE — 2580000003 HC RX 258: Performed by: INTERNAL MEDICINE

## 2023-11-20 PROCEDURE — 85027 COMPLETE CBC AUTOMATED: CPT

## 2023-11-20 PROCEDURE — 36415 COLL VENOUS BLD VENIPUNCTURE: CPT

## 2023-11-20 PROCEDURE — 99232 SBSQ HOSP IP/OBS MODERATE 35: CPT | Performed by: INTERNAL MEDICINE

## 2023-11-20 PROCEDURE — 83735 ASSAY OF MAGNESIUM: CPT

## 2023-11-20 PROCEDURE — 6370000000 HC RX 637 (ALT 250 FOR IP): Performed by: FAMILY MEDICINE

## 2023-11-20 PROCEDURE — 6370000000 HC RX 637 (ALT 250 FOR IP): Performed by: INTERNAL MEDICINE

## 2023-11-20 PROCEDURE — 82962 GLUCOSE BLOOD TEST: CPT

## 2023-11-20 PROCEDURE — 6360000002 HC RX W HCPCS: Performed by: INTERNAL MEDICINE

## 2023-11-20 PROCEDURE — 80053 COMPREHEN METABOLIC PANEL: CPT

## 2023-11-20 RX ORDER — LEVOTHYROXINE SODIUM 0.03 MG/1
25 TABLET ORAL
Qty: 30 TABLET | Refills: 3 | Status: SHIPPED | OUTPATIENT
Start: 2023-11-21

## 2023-11-20 RX ORDER — FOLIC ACID 1 MG/1
1 TABLET ORAL DAILY
Qty: 30 TABLET | Refills: 3 | Status: SHIPPED | OUTPATIENT
Start: 2023-11-20

## 2023-11-20 RX ORDER — AMOXICILLIN AND CLAVULANATE POTASSIUM 500; 125 MG/1; MG/1
1 TABLET, FILM COATED ORAL EVERY 12 HOURS
Qty: 20 TABLET | Refills: 0 | Status: SHIPPED | OUTPATIENT
Start: 2023-11-20 | End: 2023-11-30

## 2023-11-20 RX ORDER — OXYCODONE HYDROCHLORIDE AND ACETAMINOPHEN 5; 325 MG/1; MG/1
1 TABLET ORAL EVERY 4 HOURS PRN
Qty: 12 TABLET | Refills: 0 | Status: SHIPPED | OUTPATIENT
Start: 2023-11-20 | End: 2023-11-23

## 2023-11-20 RX ORDER — ASCORBIC ACID 500 MG
500 TABLET ORAL DAILY
Qty: 30 TABLET | Refills: 3 | Status: SHIPPED | OUTPATIENT
Start: 2023-11-20

## 2023-11-20 RX ADMIN — FOLIC ACID 1 MG: 1 TABLET ORAL at 10:41

## 2023-11-20 RX ADMIN — OXYCODONE HYDROCHLORIDE AND ACETAMINOPHEN 1 TABLET: 5; 325 TABLET ORAL at 07:01

## 2023-11-20 RX ADMIN — PIPERACILLIN AND TAZOBACTAM 3375 MG: 3; .375 INJECTION, POWDER, LYOPHILIZED, FOR SOLUTION INTRAVENOUS at 19:35

## 2023-11-20 RX ADMIN — PIPERACILLIN AND TAZOBACTAM 3375 MG: 3; .375 INJECTION, POWDER, LYOPHILIZED, FOR SOLUTION INTRAVENOUS at 12:04

## 2023-11-20 RX ADMIN — CYANOCOBALAMIN TAB 1000 MCG 1000 MCG: 1000 TAB at 10:41

## 2023-11-20 RX ADMIN — PIPERACILLIN AND TAZOBACTAM 3375 MG: 3; .375 INJECTION, POWDER, LYOPHILIZED, FOR SOLUTION INTRAVENOUS at 03:42

## 2023-11-20 RX ADMIN — SODIUM CHLORIDE, PRESERVATIVE FREE 10 ML: 5 INJECTION INTRAVENOUS at 19:36

## 2023-11-20 RX ADMIN — LEVOTHYROXINE SODIUM 25 MCG: 0.03 TABLET ORAL at 06:20

## 2023-11-20 RX ADMIN — ERYTHROPOIETIN 40000 UNITS: 40000 INJECTION, SOLUTION INTRAVENOUS; SUBCUTANEOUS at 12:56

## 2023-11-20 RX ADMIN — ASPIRIN 81 MG: 81 TABLET, COATED ORAL at 10:41

## 2023-11-20 RX ADMIN — ONDANSETRON 4 MG: 4 TABLET, ORALLY DISINTEGRATING ORAL at 19:35

## 2023-11-20 RX ADMIN — MIDODRINE HYDROCHLORIDE 5 MG: 5 TABLET ORAL at 19:35

## 2023-11-20 RX ADMIN — OXYCODONE HYDROCHLORIDE AND ACETAMINOPHEN 500 MG: 500 TABLET ORAL at 10:41

## 2023-11-20 RX ADMIN — OXYCODONE HYDROCHLORIDE AND ACETAMINOPHEN 1 TABLET: 5; 325 TABLET ORAL at 19:35

## 2023-11-20 ASSESSMENT — PAIN DESCRIPTION - DESCRIPTORS
DESCRIPTORS: THROBBING
DESCRIPTORS: ACHING;NAGGING

## 2023-11-20 ASSESSMENT — PAIN DESCRIPTION - ORIENTATION
ORIENTATION: OTHER (COMMENT)
ORIENTATION: RIGHT

## 2023-11-20 ASSESSMENT — PAIN SCALES - GENERAL
PAINLEVEL_OUTOF10: 6
PAINLEVEL_OUTOF10: 2
PAINLEVEL_OUTOF10: 7

## 2023-11-20 ASSESSMENT — PAIN DESCRIPTION - LOCATION
LOCATION: LEG
LOCATION: GENERALIZED

## 2023-11-20 ASSESSMENT — PAIN - FUNCTIONAL ASSESSMENT: PAIN_FUNCTIONAL_ASSESSMENT: PREVENTS OR INTERFERES SOME ACTIVE ACTIVITIES AND ADLS

## 2023-11-20 NOTE — CARE COORDINATION
CM has reviewed pt chart    DCP: SNF per therapy recs    1303: CM observed DC order with dispo as SNF    1314: CM called pt dtr to discuss dispo. She states that attending discussed with her about pt going to SNF. CM asked dtr if she had a preference, she stated she did not know of any SNFs. CM offered to send SNF list to dtr via email to review. Pt dtr provided email address: [email or web address]. NU sent SNF list within 20 miles of pt home to provided email.  CM awaiting family choice

## 2023-11-21 LAB
GLUCOSE BLD STRIP.AUTO-MCNC: 79 MG/DL (ref 65–100)
GLUCOSE BLD STRIP.AUTO-MCNC: 83 MG/DL (ref 65–100)
GLUCOSE BLD STRIP.AUTO-MCNC: 84 MG/DL (ref 65–100)
GLUCOSE BLD STRIP.AUTO-MCNC: 85 MG/DL (ref 65–100)
GLUCOSE BLD STRIP.AUTO-MCNC: 87 MG/DL (ref 65–100)
GLUCOSE BLD STRIP.AUTO-MCNC: 90 MG/DL (ref 65–100)
PERFORMED BY:: NORMAL

## 2023-11-21 PROCEDURE — 6360000002 HC RX W HCPCS: Performed by: INTERNAL MEDICINE

## 2023-11-21 PROCEDURE — 6370000000 HC RX 637 (ALT 250 FOR IP): Performed by: INTERNAL MEDICINE

## 2023-11-21 PROCEDURE — 82962 GLUCOSE BLOOD TEST: CPT

## 2023-11-21 PROCEDURE — 2580000003 HC RX 258: Performed by: INTERNAL MEDICINE

## 2023-11-21 PROCEDURE — 6370000000 HC RX 637 (ALT 250 FOR IP): Performed by: FAMILY MEDICINE

## 2023-11-21 PROCEDURE — 92526 ORAL FUNCTION THERAPY: CPT

## 2023-11-21 PROCEDURE — 1100000000 HC RM PRIVATE

## 2023-11-21 PROCEDURE — 99232 SBSQ HOSP IP/OBS MODERATE 35: CPT | Performed by: INTERNAL MEDICINE

## 2023-11-21 RX ADMIN — CASTOR OIL AND BALSAM, PERU: 788; 87 OINTMENT TOPICAL at 11:08

## 2023-11-21 RX ADMIN — CYANOCOBALAMIN TAB 1000 MCG 1000 MCG: 1000 TAB at 11:06

## 2023-11-21 RX ADMIN — OXYCODONE HYDROCHLORIDE AND ACETAMINOPHEN 1 TABLET: 5; 325 TABLET ORAL at 06:36

## 2023-11-21 RX ADMIN — OXYCODONE HYDROCHLORIDE AND ACETAMINOPHEN 1 TABLET: 5; 325 TABLET ORAL at 21:08

## 2023-11-21 RX ADMIN — OXYCODONE HYDROCHLORIDE AND ACETAMINOPHEN 1 TABLET: 5; 325 TABLET ORAL at 00:44

## 2023-11-21 RX ADMIN — MIDODRINE HYDROCHLORIDE 5 MG: 5 TABLET ORAL at 14:48

## 2023-11-21 RX ADMIN — PIPERACILLIN AND TAZOBACTAM 3375 MG: 3; .375 INJECTION, POWDER, LYOPHILIZED, FOR SOLUTION INTRAVENOUS at 11:12

## 2023-11-21 RX ADMIN — MIDODRINE HYDROCHLORIDE 5 MG: 5 TABLET ORAL at 21:08

## 2023-11-21 RX ADMIN — PIPERACILLIN AND TAZOBACTAM 3375 MG: 3; .375 INJECTION, POWDER, LYOPHILIZED, FOR SOLUTION INTRAVENOUS at 04:48

## 2023-11-21 RX ADMIN — SODIUM CHLORIDE, PRESERVATIVE FREE 10 ML: 5 INJECTION INTRAVENOUS at 11:08

## 2023-11-21 RX ADMIN — IRON SUCROSE 100 MG: 20 INJECTION, SOLUTION INTRAVENOUS at 16:08

## 2023-11-21 RX ADMIN — FOLIC ACID 1 MG: 1 TABLET ORAL at 11:07

## 2023-11-21 RX ADMIN — ASPIRIN 81 MG: 81 TABLET, COATED ORAL at 11:06

## 2023-11-21 RX ADMIN — OXYCODONE HYDROCHLORIDE AND ACETAMINOPHEN 500 MG: 500 TABLET ORAL at 11:06

## 2023-11-21 RX ADMIN — ERYTHROPOIETIN 40000 UNITS: 40000 INJECTION, SOLUTION INTRAVENOUS; SUBCUTANEOUS at 22:31

## 2023-11-21 RX ADMIN — MIDODRINE HYDROCHLORIDE 5 MG: 5 TABLET ORAL at 04:49

## 2023-11-21 RX ADMIN — SODIUM CHLORIDE, PRESERVATIVE FREE 10 ML: 5 INJECTION INTRAVENOUS at 21:08

## 2023-11-21 RX ADMIN — PIPERACILLIN AND TAZOBACTAM 3375 MG: 3; .375 INJECTION, POWDER, LYOPHILIZED, FOR SOLUTION INTRAVENOUS at 20:54

## 2023-11-21 RX ADMIN — LEVOTHYROXINE SODIUM 25 MCG: 0.03 TABLET ORAL at 04:49

## 2023-11-21 ASSESSMENT — PAIN DESCRIPTION - ORIENTATION
ORIENTATION: RIGHT;LEFT;LOWER
ORIENTATION: OTHER (COMMENT)
ORIENTATION: OTHER (COMMENT)

## 2023-11-21 ASSESSMENT — PAIN DESCRIPTION - DESCRIPTORS
DESCRIPTORS: ACHING;GNAWING;NAGGING
DESCRIPTORS: ACHING;GNAWING;NAGGING
DESCRIPTORS: ACHING;TIGHTNESS;NAGGING

## 2023-11-21 ASSESSMENT — PAIN DESCRIPTION - LOCATION
LOCATION: GENERALIZED
LOCATION: BACK;KNEE

## 2023-11-21 ASSESSMENT — PAIN SCALES - GENERAL
PAINLEVEL_OUTOF10: 7
PAINLEVEL_OUTOF10: 3
PAINLEVEL_OUTOF10: 7
PAINLEVEL_OUTOF10: 7
PAINLEVEL_OUTOF10: 2
PAINLEVEL_OUTOF10: 2

## 2023-11-21 ASSESSMENT — PAIN - FUNCTIONAL ASSESSMENT
PAIN_FUNCTIONAL_ASSESSMENT: PREVENTS OR INTERFERES SOME ACTIVE ACTIVITIES AND ADLS

## 2023-11-21 NOTE — CARE COORDINATION
CM reviewed Pt medicals, Pt daughter received SNF list yesterday. CM will call daughter for choice, Pt cannot D/C today, the SNF Pt daughter chooses will have to get auth.     2:00  CM called Pt daughter, she asked CM to send a referral to [facility]. Pt needs PT/OT eval/recommendations. PT/OT need orders. Verbal choice letter signed for [facility], will send a referral, will place choice letter on Pt chart. CM called Dr. Dayton Herrera and requested orders for PT/OT. 3:10  Pt daughter called CM back and stated that she would like to change her mind about the SNF. Pt daughter asked CM to send to [facility] and [address] [place] [address] and Rehab. Pt daughter asked CM to cancel the referral to [facility].

## 2023-11-22 LAB
ALBUMIN SERPL-MCNC: 2 G/DL (ref 3.5–5)
ANION GAP SERPL CALC-SCNC: 10 MMOL/L (ref 5–15)
BASOPHILS # BLD: 0 K/UL (ref 0–0.1)
BASOPHILS NFR BLD: 0 % (ref 0–1)
BUN SERPL-MCNC: 18 MG/DL (ref 6–20)
BUN/CREAT SERPL: 15 (ref 12–20)
CA-I BLD-MCNC: 8.6 MG/DL (ref 8.5–10.1)
CHLORIDE SERPL-SCNC: 105 MMOL/L (ref 97–108)
CO2 SERPL-SCNC: 23 MMOL/L (ref 21–32)
CREAT SERPL-MCNC: 1.23 MG/DL (ref 0.55–1.02)
DIFFERENTIAL METHOD BLD: ABNORMAL
EOSINOPHIL # BLD: 0.1 K/UL (ref 0–0.4)
EOSINOPHIL NFR BLD: 2 % (ref 0–7)
ERYTHROCYTE [DISTWIDTH] IN BLOOD BY AUTOMATED COUNT: 26.7 % (ref 11.5–14.5)
GLUCOSE BLD STRIP.AUTO-MCNC: 82 MG/DL (ref 65–100)
GLUCOSE BLD STRIP.AUTO-MCNC: 86 MG/DL (ref 65–100)
GLUCOSE BLD STRIP.AUTO-MCNC: 91 MG/DL (ref 65–100)
GLUCOSE SERPL-MCNC: 65 MG/DL (ref 65–100)
HCT VFR BLD AUTO: 23 % (ref 35–47)
HGB BLD-MCNC: 7.1 G/DL (ref 11.5–16)
IMM GRANULOCYTES # BLD AUTO: 0 K/UL
IMM GRANULOCYTES NFR BLD AUTO: 0 %
LYMPHOCYTES # BLD: 1.5 K/UL (ref 0.8–3.5)
LYMPHOCYTES NFR BLD: 23 % (ref 12–49)
MAGNESIUM SERPL-MCNC: 1.9 MG/DL (ref 1.6–2.4)
MCH RBC QN AUTO: 26.8 PG (ref 26–34)
MCHC RBC AUTO-ENTMCNC: 30.9 G/DL (ref 30–36.5)
MCV RBC AUTO: 86.8 FL (ref 80–99)
MONOCYTES # BLD: 0.5 K/UL (ref 0–1)
MONOCYTES NFR BLD: 8 % (ref 5–13)
NEUTS SEG # BLD: 4.6 K/UL (ref 1.8–8)
NEUTS SEG NFR BLD: 67 % (ref 32–75)
NRBC # BLD: 0.1 K/UL (ref 0–0.01)
NRBC BLD-RTO: 1.5 PER 100 WBC
PERFORMED BY:: NORMAL
PHOSPHATE SERPL-MCNC: 4.4 MG/DL (ref 2.6–4.7)
PLATELET # BLD AUTO: 822 K/UL (ref 150–400)
PMV BLD AUTO: 12.8 FL (ref 8.9–12.9)
POTASSIUM SERPL-SCNC: 4 MMOL/L (ref 3.5–5.1)
RBC # BLD AUTO: 2.65 M/UL (ref 3.8–5.2)
RBC MORPH BLD: ABNORMAL
SODIUM SERPL-SCNC: 138 MMOL/L (ref 136–145)
WBC # BLD AUTO: 6.7 K/UL (ref 3.6–11)

## 2023-11-22 PROCEDURE — 6370000000 HC RX 637 (ALT 250 FOR IP): Performed by: INTERNAL MEDICINE

## 2023-11-22 PROCEDURE — 1100000000 HC RM PRIVATE

## 2023-11-22 PROCEDURE — 2580000003 HC RX 258: Performed by: INTERNAL MEDICINE

## 2023-11-22 PROCEDURE — 80069 RENAL FUNCTION PANEL: CPT

## 2023-11-22 PROCEDURE — 97530 THERAPEUTIC ACTIVITIES: CPT

## 2023-11-22 PROCEDURE — 97161 PT EVAL LOW COMPLEX 20 MIN: CPT

## 2023-11-22 PROCEDURE — 6360000002 HC RX W HCPCS: Performed by: INTERNAL MEDICINE

## 2023-11-22 PROCEDURE — 82962 GLUCOSE BLOOD TEST: CPT

## 2023-11-22 PROCEDURE — 6370000000 HC RX 637 (ALT 250 FOR IP): Performed by: FAMILY MEDICINE

## 2023-11-22 PROCEDURE — 36415 COLL VENOUS BLD VENIPUNCTURE: CPT

## 2023-11-22 PROCEDURE — 99232 SBSQ HOSP IP/OBS MODERATE 35: CPT | Performed by: INTERNAL MEDICINE

## 2023-11-22 PROCEDURE — 85025 COMPLETE CBC W/AUTO DIFF WBC: CPT

## 2023-11-22 PROCEDURE — 97165 OT EVAL LOW COMPLEX 30 MIN: CPT

## 2023-11-22 PROCEDURE — 83735 ASSAY OF MAGNESIUM: CPT

## 2023-11-22 RX ADMIN — OXYCODONE HYDROCHLORIDE AND ACETAMINOPHEN 1 TABLET: 5; 325 TABLET ORAL at 20:38

## 2023-11-22 RX ADMIN — OXYCODONE HYDROCHLORIDE AND ACETAMINOPHEN 1 TABLET: 5; 325 TABLET ORAL at 04:29

## 2023-11-22 RX ADMIN — FOLIC ACID 1 MG: 1 TABLET ORAL at 10:55

## 2023-11-22 RX ADMIN — MIDODRINE HYDROCHLORIDE 5 MG: 5 TABLET ORAL at 04:29

## 2023-11-22 RX ADMIN — SODIUM CHLORIDE, PRESERVATIVE FREE 10 ML: 5 INJECTION INTRAVENOUS at 10:58

## 2023-11-22 RX ADMIN — OXYCODONE HYDROCHLORIDE AND ACETAMINOPHEN 500 MG: 500 TABLET ORAL at 10:55

## 2023-11-22 RX ADMIN — CASTOR OIL AND BALSAM, PERU: 788; 87 OINTMENT TOPICAL at 10:55

## 2023-11-22 RX ADMIN — CYANOCOBALAMIN TAB 1000 MCG 1000 MCG: 1000 TAB at 10:55

## 2023-11-22 RX ADMIN — LEVOTHYROXINE SODIUM 25 MCG: 0.03 TABLET ORAL at 05:48

## 2023-11-22 RX ADMIN — ONDANSETRON 4 MG: 4 TABLET, ORALLY DISINTEGRATING ORAL at 20:38

## 2023-11-22 RX ADMIN — SODIUM CHLORIDE, PRESERVATIVE FREE 10 ML: 5 INJECTION INTRAVENOUS at 20:39

## 2023-11-22 RX ADMIN — PIPERACILLIN AND TAZOBACTAM 3375 MG: 3; .375 INJECTION, POWDER, LYOPHILIZED, FOR SOLUTION INTRAVENOUS at 04:29

## 2023-11-22 RX ADMIN — ASPIRIN 81 MG: 81 TABLET, COATED ORAL at 10:55

## 2023-11-22 RX ADMIN — ERYTHROPOIETIN 40000 UNITS: 40000 INJECTION, SOLUTION INTRAVENOUS; SUBCUTANEOUS at 17:19

## 2023-11-22 RX ADMIN — MIDODRINE HYDROCHLORIDE 5 MG: 5 TABLET ORAL at 14:43

## 2023-11-22 ASSESSMENT — PAIN SCALES - GENERAL
PAINLEVEL_OUTOF10: 6
PAINLEVEL_OUTOF10: 1
PAINLEVEL_OUTOF10: 7
PAINLEVEL_OUTOF10: 1

## 2023-11-22 ASSESSMENT — PAIN DESCRIPTION - DESCRIPTORS: DESCRIPTORS: ACHING;TIGHTNESS;GNAWING

## 2023-11-22 ASSESSMENT — PAIN DESCRIPTION - ORIENTATION
ORIENTATION: LEFT;RIGHT;LOWER
ORIENTATION: LEFT;RIGHT;LOWER

## 2023-11-22 ASSESSMENT — PAIN DESCRIPTION - LOCATION
LOCATION: BACK;KNEE
LOCATION: BACK;KNEE;OTHER (COMMENT)

## 2023-11-22 NOTE — CARE COORDINATION
CM has reviewed pt chart    DCP: [facility] pending bed availability; will need auth

## 2023-11-23 LAB
GLUCOSE BLD STRIP.AUTO-MCNC: 113 MG/DL (ref 65–100)
GLUCOSE BLD STRIP.AUTO-MCNC: 128 MG/DL (ref 65–100)
GLUCOSE BLD STRIP.AUTO-MCNC: 133 MG/DL (ref 65–100)
GLUCOSE BLD STRIP.AUTO-MCNC: 76 MG/DL (ref 65–100)
PERFORMED BY:: ABNORMAL
PERFORMED BY:: NORMAL

## 2023-11-23 PROCEDURE — 1100000000 HC RM PRIVATE

## 2023-11-23 PROCEDURE — 92526 ORAL FUNCTION THERAPY: CPT

## 2023-11-23 PROCEDURE — 6370000000 HC RX 637 (ALT 250 FOR IP): Performed by: FAMILY MEDICINE

## 2023-11-23 PROCEDURE — 6370000000 HC RX 637 (ALT 250 FOR IP): Performed by: INTERNAL MEDICINE

## 2023-11-23 PROCEDURE — 2580000003 HC RX 258: Performed by: INTERNAL MEDICINE

## 2023-11-23 PROCEDURE — 82962 GLUCOSE BLOOD TEST: CPT

## 2023-11-23 PROCEDURE — 6360000002 HC RX W HCPCS: Performed by: INTERNAL MEDICINE

## 2023-11-23 PROCEDURE — 99232 SBSQ HOSP IP/OBS MODERATE 35: CPT | Performed by: INTERNAL MEDICINE

## 2023-11-23 RX ADMIN — OXYCODONE HYDROCHLORIDE AND ACETAMINOPHEN 1 TABLET: 5; 325 TABLET ORAL at 20:09

## 2023-11-23 RX ADMIN — OXYCODONE HYDROCHLORIDE AND ACETAMINOPHEN 1 TABLET: 5; 325 TABLET ORAL at 09:14

## 2023-11-23 RX ADMIN — CYANOCOBALAMIN TAB 1000 MCG 1000 MCG: 1000 TAB at 09:15

## 2023-11-23 RX ADMIN — CASTOR OIL AND BALSAM, PERU: 788; 87 OINTMENT TOPICAL at 09:22

## 2023-11-23 RX ADMIN — SODIUM CHLORIDE, PRESERVATIVE FREE 10 ML: 5 INJECTION INTRAVENOUS at 09:16

## 2023-11-23 RX ADMIN — FOLIC ACID 1 MG: 1 TABLET ORAL at 09:15

## 2023-11-23 RX ADMIN — LEVOTHYROXINE SODIUM 25 MCG: 0.03 TABLET ORAL at 05:26

## 2023-11-23 RX ADMIN — MIDODRINE HYDROCHLORIDE 5 MG: 5 TABLET ORAL at 05:26

## 2023-11-23 RX ADMIN — SODIUM CHLORIDE, PRESERVATIVE FREE 10 ML: 5 INJECTION INTRAVENOUS at 20:09

## 2023-11-23 RX ADMIN — ERYTHROPOIETIN 40000 UNITS: 40000 INJECTION, SOLUTION INTRAVENOUS; SUBCUTANEOUS at 10:51

## 2023-11-23 RX ADMIN — OXYCODONE HYDROCHLORIDE AND ACETAMINOPHEN 1 TABLET: 5; 325 TABLET ORAL at 02:53

## 2023-11-23 RX ADMIN — OXYCODONE HYDROCHLORIDE AND ACETAMINOPHEN 500 MG: 500 TABLET ORAL at 09:15

## 2023-11-23 RX ADMIN — ASPIRIN 81 MG: 81 TABLET, COATED ORAL at 09:16

## 2023-11-23 ASSESSMENT — PAIN SCALES - WONG BAKER: WONGBAKER_NUMERICALRESPONSE: 0

## 2023-11-23 ASSESSMENT — PAIN SCALES - GENERAL
PAINLEVEL_OUTOF10: 7
PAINLEVEL_OUTOF10: 0
PAINLEVEL_OUTOF10: 7
PAINLEVEL_OUTOF10: 1
PAINLEVEL_OUTOF10: 7
PAINLEVEL_OUTOF10: 0

## 2023-11-23 ASSESSMENT — PAIN DESCRIPTION - ORIENTATION
ORIENTATION: LEFT;RIGHT;LOWER
ORIENTATION: LEFT;RIGHT;LOWER
ORIENTATION: RIGHT;LOWER

## 2023-11-23 ASSESSMENT — PAIN - FUNCTIONAL ASSESSMENT
PAIN_FUNCTIONAL_ASSESSMENT: PREVENTS OR INTERFERES SOME ACTIVE ACTIVITIES AND ADLS

## 2023-11-23 ASSESSMENT — PAIN DESCRIPTION - DESCRIPTORS
DESCRIPTORS: DISCOMFORT
DESCRIPTORS: ACHING;GNAWING;TIGHTNESS
DESCRIPTORS: ACHING;GNAWING;TIGHTNESS

## 2023-11-23 ASSESSMENT — PAIN DESCRIPTION - LOCATION
LOCATION: BACK;KNEE
LOCATION: LEG
LOCATION: KNEE

## 2023-11-24 LAB
ALBUMIN SERPL-MCNC: 2 G/DL (ref 3.5–5)
ANION GAP SERPL CALC-SCNC: 8 MMOL/L (ref 5–15)
BASOPHILS # BLD: 0 K/UL (ref 0–0.1)
BASOPHILS NFR BLD: 0 % (ref 0–1)
BUN SERPL-MCNC: 14 MG/DL (ref 6–20)
BUN/CREAT SERPL: 11 (ref 12–20)
CA-I BLD-MCNC: 8.5 MG/DL (ref 8.5–10.1)
CHLORIDE SERPL-SCNC: 105 MMOL/L (ref 97–108)
CO2 SERPL-SCNC: 24 MMOL/L (ref 21–32)
CREAT SERPL-MCNC: 1.29 MG/DL (ref 0.55–1.02)
EOSINOPHIL # BLD: 0.3 K/UL (ref 0–0.4)
EOSINOPHIL NFR BLD: 4 % (ref 0–7)
ERYTHROCYTE [DISTWIDTH] IN BLOOD BY AUTOMATED COUNT: 29.7 % (ref 11.5–14.5)
GLUCOSE BLD STRIP.AUTO-MCNC: 128 MG/DL (ref 65–100)
GLUCOSE BLD STRIP.AUTO-MCNC: 82 MG/DL (ref 65–100)
GLUCOSE BLD STRIP.AUTO-MCNC: 91 MG/DL (ref 65–100)
GLUCOSE BLD STRIP.AUTO-MCNC: 97 MG/DL (ref 65–100)
GLUCOSE SERPL-MCNC: 81 MG/DL (ref 65–100)
HCT VFR BLD AUTO: 22.7 % (ref 35–47)
HGB BLD-MCNC: 6.9 G/DL (ref 11.5–16)
IMM GRANULOCYTES # BLD AUTO: 0 K/UL
IMM GRANULOCYTES NFR BLD AUTO: 0 %
LYMPHOCYTES # BLD: 2.1 K/UL (ref 0.8–3.5)
LYMPHOCYTES NFR BLD: 30 % (ref 12–49)
MAGNESIUM SERPL-MCNC: 1.9 MG/DL (ref 1.6–2.4)
MCH RBC QN AUTO: 26.3 PG (ref 26–34)
MCHC RBC AUTO-ENTMCNC: 30.4 G/DL (ref 30–36.5)
MCV RBC AUTO: 86.6 FL (ref 80–99)
MONOCYTES # BLD: 0.4 K/UL (ref 0–1)
MONOCYTES NFR BLD: 6 % (ref 5–13)
NEUTS SEG # BLD: 4.1 K/UL (ref 1.8–8)
NEUTS SEG NFR BLD: 60 % (ref 32–75)
NRBC # BLD: 0.08 K/UL (ref 0–0.01)
NRBC BLD-RTO: 1.2 PER 100 WBC
PERFORMED BY:: ABNORMAL
PERFORMED BY:: NORMAL
PHOSPHATE SERPL-MCNC: 3.6 MG/DL (ref 2.6–4.7)
PLATELET # BLD AUTO: 847 K/UL (ref 150–400)
PMV BLD AUTO: 12.8 FL (ref 8.9–12.9)
POTASSIUM SERPL-SCNC: 4 MMOL/L (ref 3.5–5.1)
RBC # BLD AUTO: 2.62 M/UL (ref 3.8–5.2)
RBC MORPH BLD: ABNORMAL
SODIUM SERPL-SCNC: 137 MMOL/L (ref 136–145)
WBC # BLD AUTO: 6.9 K/UL (ref 3.6–11)

## 2023-11-24 PROCEDURE — 80069 RENAL FUNCTION PANEL: CPT

## 2023-11-24 PROCEDURE — 85025 COMPLETE CBC W/AUTO DIFF WBC: CPT

## 2023-11-24 PROCEDURE — 6370000000 HC RX 637 (ALT 250 FOR IP): Performed by: INTERNAL MEDICINE

## 2023-11-24 PROCEDURE — 2580000003 HC RX 258: Performed by: INTERNAL MEDICINE

## 2023-11-24 PROCEDURE — 36415 COLL VENOUS BLD VENIPUNCTURE: CPT

## 2023-11-24 PROCEDURE — 99232 SBSQ HOSP IP/OBS MODERATE 35: CPT | Performed by: INTERNAL MEDICINE

## 2023-11-24 PROCEDURE — 6360000002 HC RX W HCPCS: Performed by: INTERNAL MEDICINE

## 2023-11-24 PROCEDURE — 6370000000 HC RX 637 (ALT 250 FOR IP): Performed by: FAMILY MEDICINE

## 2023-11-24 PROCEDURE — 1100000000 HC RM PRIVATE

## 2023-11-24 PROCEDURE — 83735 ASSAY OF MAGNESIUM: CPT

## 2023-11-24 PROCEDURE — 82962 GLUCOSE BLOOD TEST: CPT

## 2023-11-24 RX ADMIN — FOLIC ACID 1 MG: 1 TABLET ORAL at 09:58

## 2023-11-24 RX ADMIN — ERYTHROPOIETIN 40000 UNITS: 40000 INJECTION, SOLUTION INTRAVENOUS; SUBCUTANEOUS at 10:58

## 2023-11-24 RX ADMIN — CYANOCOBALAMIN TAB 1000 MCG 1000 MCG: 1000 TAB at 09:58

## 2023-11-24 RX ADMIN — LEVOTHYROXINE SODIUM 25 MCG: 0.03 TABLET ORAL at 04:43

## 2023-11-24 RX ADMIN — SODIUM CHLORIDE, PRESERVATIVE FREE 10 ML: 5 INJECTION INTRAVENOUS at 09:58

## 2023-11-24 RX ADMIN — SODIUM CHLORIDE, PRESERVATIVE FREE 10 ML: 5 INJECTION INTRAVENOUS at 19:43

## 2023-11-24 RX ADMIN — OXYCODONE HYDROCHLORIDE AND ACETAMINOPHEN 1 TABLET: 5; 325 TABLET ORAL at 19:43

## 2023-11-24 RX ADMIN — OXYCODONE HYDROCHLORIDE AND ACETAMINOPHEN 1 TABLET: 5; 325 TABLET ORAL at 00:45

## 2023-11-24 RX ADMIN — ONDANSETRON 4 MG: 4 TABLET, ORALLY DISINTEGRATING ORAL at 00:07

## 2023-11-24 RX ADMIN — ASPIRIN 81 MG: 81 TABLET, COATED ORAL at 09:58

## 2023-11-24 RX ADMIN — OXYCODONE HYDROCHLORIDE AND ACETAMINOPHEN 500 MG: 500 TABLET ORAL at 09:58

## 2023-11-24 ASSESSMENT — PAIN DESCRIPTION - ORIENTATION
ORIENTATION: RIGHT;LEFT;LOWER
ORIENTATION: RIGHT;LEFT;LOWER

## 2023-11-24 ASSESSMENT — PAIN DESCRIPTION - LOCATION
LOCATION: BACK;KNEE
LOCATION: BACK;KNEE

## 2023-11-24 ASSESSMENT — PAIN SCALES - GENERAL
PAINLEVEL_OUTOF10: 1
PAINLEVEL_OUTOF10: 2
PAINLEVEL_OUTOF10: 7
PAINLEVEL_OUTOF10: 7
PAINLEVEL_OUTOF10: 0

## 2023-11-24 ASSESSMENT — PAIN DESCRIPTION - DESCRIPTORS
DESCRIPTORS: ACHING;GNAWING;TIGHTNESS
DESCRIPTORS: ACHING;GNAWING;TIGHTNESS

## 2023-11-24 NOTE — CARE COORDINATION
CM has reviewed pt chart    DCP: SNF rec; CM to follow up with daughter on choice    0915: CM sent message to [facility] to see if they are able to accept pt, awaiting reply    0921: [facility] requested updated therapy notes.      1345: CM attached updated therapy notes in Care\[facility] [place] [facility]\""

## 2023-11-25 LAB
GLUCOSE BLD STRIP.AUTO-MCNC: 74 MG/DL (ref 65–100)
GLUCOSE BLD STRIP.AUTO-MCNC: 83 MG/DL (ref 65–100)
PERFORMED BY:: NORMAL
PERFORMED BY:: NORMAL

## 2023-11-25 PROCEDURE — 6370000000 HC RX 637 (ALT 250 FOR IP): Performed by: FAMILY MEDICINE

## 2023-11-25 PROCEDURE — 6370000000 HC RX 637 (ALT 250 FOR IP): Performed by: PODIATRIST

## 2023-11-25 PROCEDURE — 6370000000 HC RX 637 (ALT 250 FOR IP): Performed by: INTERNAL MEDICINE

## 2023-11-25 PROCEDURE — 1100000000 HC RM PRIVATE

## 2023-11-25 PROCEDURE — 6360000002 HC RX W HCPCS: Performed by: INTERNAL MEDICINE

## 2023-11-25 PROCEDURE — 82962 GLUCOSE BLOOD TEST: CPT

## 2023-11-25 RX ADMIN — OXYCODONE HYDROCHLORIDE AND ACETAMINOPHEN 1 TABLET: 5; 325 TABLET ORAL at 00:01

## 2023-11-25 RX ADMIN — CYANOCOBALAMIN TAB 1000 MCG 1000 MCG: 1000 TAB at 09:53

## 2023-11-25 RX ADMIN — CASTOR OIL AND BALSAM, PERU: 788; 87 OINTMENT TOPICAL at 09:53

## 2023-11-25 RX ADMIN — ERYTHROPOIETIN 40000 UNITS: 40000 INJECTION, SOLUTION INTRAVENOUS; SUBCUTANEOUS at 11:17

## 2023-11-25 RX ADMIN — OXYCODONE HYDROCHLORIDE AND ACETAMINOPHEN 500 MG: 500 TABLET ORAL at 09:53

## 2023-11-25 RX ADMIN — FOLIC ACID 1 MG: 1 TABLET ORAL at 09:53

## 2023-11-25 RX ADMIN — LEVOTHYROXINE SODIUM 25 MCG: 0.03 TABLET ORAL at 05:11

## 2023-11-25 RX ADMIN — ASPIRIN 81 MG: 81 TABLET, COATED ORAL at 09:53

## 2023-11-25 RX ADMIN — ONDANSETRON 4 MG: 4 TABLET, ORALLY DISINTEGRATING ORAL at 05:11

## 2023-11-25 RX ADMIN — OXYCODONE HYDROCHLORIDE AND ACETAMINOPHEN 1 TABLET: 5; 325 TABLET ORAL at 05:11

## 2023-11-25 ASSESSMENT — PAIN DESCRIPTION - ORIENTATION
ORIENTATION: RIGHT;LEFT;LOWER
ORIENTATION: RIGHT;LEFT;LOWER

## 2023-11-25 ASSESSMENT — PAIN SCALES - GENERAL
PAINLEVEL_OUTOF10: 7
PAINLEVEL_OUTOF10: 2
PAINLEVEL_OUTOF10: 7
PAINLEVEL_OUTOF10: 1

## 2023-11-25 ASSESSMENT — PAIN DESCRIPTION - LOCATION
LOCATION: BACK;KNEE
LOCATION: BACK;KNEE

## 2023-11-25 ASSESSMENT — PAIN DESCRIPTION - DESCRIPTORS
DESCRIPTORS: ACHING;GNAWING;TIGHTNESS
DESCRIPTORS: ACHING;GNAWING;TIGHTNESS

## 2023-11-26 LAB
GLUCOSE BLD STRIP.AUTO-MCNC: 114 MG/DL (ref 65–100)
GLUCOSE BLD STRIP.AUTO-MCNC: 140 MG/DL (ref 65–100)
GLUCOSE BLD STRIP.AUTO-MCNC: 72 MG/DL (ref 65–100)
GLUCOSE BLD STRIP.AUTO-MCNC: 73 MG/DL (ref 65–100)
PERFORMED BY:: ABNORMAL
PERFORMED BY:: ABNORMAL
PERFORMED BY:: NORMAL
PERFORMED BY:: NORMAL

## 2023-11-26 PROCEDURE — 82962 GLUCOSE BLOOD TEST: CPT

## 2023-11-26 PROCEDURE — 1100000000 HC RM PRIVATE

## 2023-11-26 PROCEDURE — 6370000000 HC RX 637 (ALT 250 FOR IP): Performed by: INTERNAL MEDICINE

## 2023-11-26 PROCEDURE — 6360000002 HC RX W HCPCS: Performed by: INTERNAL MEDICINE

## 2023-11-26 RX ADMIN — FOLIC ACID 1 MG: 1 TABLET ORAL at 09:55

## 2023-11-26 RX ADMIN — LEVOTHYROXINE SODIUM 25 MCG: 0.03 TABLET ORAL at 09:55

## 2023-11-26 RX ADMIN — ASPIRIN 81 MG: 81 TABLET, COATED ORAL at 09:55

## 2023-11-26 RX ADMIN — CASTOR OIL AND BALSAM, PERU: 788; 87 OINTMENT TOPICAL at 09:56

## 2023-11-26 RX ADMIN — CYANOCOBALAMIN TAB 1000 MCG 1000 MCG: 1000 TAB at 09:55

## 2023-11-26 RX ADMIN — OXYCODONE HYDROCHLORIDE AND ACETAMINOPHEN 500 MG: 500 TABLET ORAL at 09:55

## 2023-11-26 RX ADMIN — ERYTHROPOIETIN 40000 UNITS: 40000 INJECTION, SOLUTION INTRAVENOUS; SUBCUTANEOUS at 10:31

## 2023-11-26 ASSESSMENT — PAIN SCALES - GENERAL
PAINLEVEL_OUTOF10: 0

## 2023-11-27 LAB
ALBUMIN SERPL-MCNC: 2 G/DL (ref 3.5–5)
ANION GAP SERPL CALC-SCNC: 6 MMOL/L (ref 5–15)
BASOPHILS # BLD: 0.1 K/UL (ref 0–0.1)
BASOPHILS NFR BLD: 1 % (ref 0–1)
BUN SERPL-MCNC: 13 MG/DL (ref 6–20)
BUN/CREAT SERPL: 11 (ref 12–20)
CA-I BLD-MCNC: 8.6 MG/DL (ref 8.5–10.1)
CHLORIDE SERPL-SCNC: 105 MMOL/L (ref 97–108)
CO2 SERPL-SCNC: 24 MMOL/L (ref 21–32)
CREAT SERPL-MCNC: 1.2 MG/DL (ref 0.55–1.02)
DIFFERENTIAL METHOD BLD: ABNORMAL
EOSINOPHIL # BLD: 0.2 K/UL (ref 0–0.4)
EOSINOPHIL NFR BLD: 3 % (ref 0–7)
ERYTHROCYTE [DISTWIDTH] IN BLOOD BY AUTOMATED COUNT: 28 % (ref 11.5–14.5)
GLUCOSE BLD STRIP.AUTO-MCNC: 100 MG/DL (ref 65–100)
GLUCOSE BLD STRIP.AUTO-MCNC: 141 MG/DL (ref 65–100)
GLUCOSE BLD STRIP.AUTO-MCNC: 143 MG/DL (ref 65–100)
GLUCOSE BLD STRIP.AUTO-MCNC: 92 MG/DL (ref 65–100)
GLUCOSE SERPL-MCNC: 82 MG/DL (ref 65–100)
HCT VFR BLD AUTO: 27.3 % (ref 35–47)
HGB BLD-MCNC: 8.1 G/DL (ref 11.5–16)
IMM GRANULOCYTES # BLD AUTO: 0.1 K/UL (ref 0–0.04)
IMM GRANULOCYTES NFR BLD AUTO: 1 % (ref 0–0.5)
LYMPHOCYTES # BLD: 1.9 K/UL (ref 0.8–3.5)
LYMPHOCYTES NFR BLD: 37 % (ref 12–49)
MAGNESIUM SERPL-MCNC: 2.1 MG/DL (ref 1.6–2.4)
MCH RBC QN AUTO: 26.7 PG (ref 26–34)
MCHC RBC AUTO-ENTMCNC: 29.7 G/DL (ref 30–36.5)
MCV RBC AUTO: 90.1 FL (ref 80–99)
MONOCYTES # BLD: 0.5 K/UL (ref 0–1)
MONOCYTES NFR BLD: 10 % (ref 5–13)
NEUTS SEG # BLD: 2.4 K/UL (ref 1.8–8)
NEUTS SEG NFR BLD: 48 % (ref 32–75)
NRBC # BLD: 0.09 K/UL (ref 0–0.01)
NRBC BLD-RTO: 1.7 PER 100 WBC
PERFORMED BY:: ABNORMAL
PERFORMED BY:: ABNORMAL
PERFORMED BY:: NORMAL
PERFORMED BY:: NORMAL
PHOSPHATE SERPL-MCNC: 3.4 MG/DL (ref 2.6–4.7)
PLATELET # BLD AUTO: 600 K/UL (ref 150–400)
PMV BLD AUTO: 12.5 FL (ref 8.9–12.9)
POTASSIUM SERPL-SCNC: 4.3 MMOL/L (ref 3.5–5.1)
RBC # BLD AUTO: 3.03 M/UL (ref 3.8–5.2)
RBC MORPH BLD: ABNORMAL
SODIUM SERPL-SCNC: 135 MMOL/L (ref 136–145)
WBC # BLD AUTO: 5.2 K/UL (ref 3.6–11)

## 2023-11-27 PROCEDURE — 6370000000 HC RX 637 (ALT 250 FOR IP): Performed by: INTERNAL MEDICINE

## 2023-11-27 PROCEDURE — 83735 ASSAY OF MAGNESIUM: CPT

## 2023-11-27 PROCEDURE — 99232 SBSQ HOSP IP/OBS MODERATE 35: CPT | Performed by: INTERNAL MEDICINE

## 2023-11-27 PROCEDURE — 1100000000 HC RM PRIVATE

## 2023-11-27 PROCEDURE — 6360000002 HC RX W HCPCS: Performed by: INTERNAL MEDICINE

## 2023-11-27 PROCEDURE — 80069 RENAL FUNCTION PANEL: CPT

## 2023-11-27 PROCEDURE — 99232 SBSQ HOSP IP/OBS MODERATE 35: CPT | Performed by: PODIATRIST

## 2023-11-27 PROCEDURE — 82962 GLUCOSE BLOOD TEST: CPT

## 2023-11-27 PROCEDURE — 6370000000 HC RX 637 (ALT 250 FOR IP): Performed by: FAMILY MEDICINE

## 2023-11-27 PROCEDURE — 97530 THERAPEUTIC ACTIVITIES: CPT

## 2023-11-27 PROCEDURE — 85025 COMPLETE CBC W/AUTO DIFF WBC: CPT

## 2023-11-27 PROCEDURE — 36415 COLL VENOUS BLD VENIPUNCTURE: CPT

## 2023-11-27 RX ADMIN — OXYCODONE HYDROCHLORIDE AND ACETAMINOPHEN 500 MG: 500 TABLET ORAL at 09:24

## 2023-11-27 RX ADMIN — LEVOTHYROXINE SODIUM 25 MCG: 0.03 TABLET ORAL at 09:24

## 2023-11-27 RX ADMIN — OXYCODONE HYDROCHLORIDE AND ACETAMINOPHEN 1 TABLET: 5; 325 TABLET ORAL at 21:44

## 2023-11-27 RX ADMIN — ASPIRIN 81 MG: 81 TABLET, COATED ORAL at 09:24

## 2023-11-27 RX ADMIN — FOLIC ACID 1 MG: 1 TABLET ORAL at 09:24

## 2023-11-27 RX ADMIN — ERYTHROPOIETIN 40000 UNITS: 40000 INJECTION, SOLUTION INTRAVENOUS; SUBCUTANEOUS at 11:53

## 2023-11-27 RX ADMIN — CASTOR OIL AND BALSAM, PERU: 788; 87 OINTMENT TOPICAL at 09:33

## 2023-11-27 RX ADMIN — CYANOCOBALAMIN TAB 1000 MCG 1000 MCG: 1000 TAB at 09:24

## 2023-11-27 ASSESSMENT — PAIN - FUNCTIONAL ASSESSMENT: PAIN_FUNCTIONAL_ASSESSMENT: PREVENTS OR INTERFERES SOME ACTIVE ACTIVITIES AND ADLS

## 2023-11-27 ASSESSMENT — PAIN DESCRIPTION - DESCRIPTORS: DESCRIPTORS: ACHING;GNAWING;TIGHTNESS

## 2023-11-27 ASSESSMENT — PAIN DESCRIPTION - LOCATION: LOCATION: BACK;KNEE

## 2023-11-27 ASSESSMENT — PAIN SCALES - GENERAL
PAINLEVEL_OUTOF10: 1
PAINLEVEL_OUTOF10: 7

## 2023-11-27 ASSESSMENT — PAIN DESCRIPTION - ORIENTATION: ORIENTATION: RIGHT;LEFT;LOWER

## 2023-11-27 NOTE — CARE COORDINATION
CM has reviewed pt chart    DCP: SNF rec; pending bed availability; choices are [facility] and [facility]    1113: CM sent messages to [facility] to see if they are able to accept pt. Awaiting reply. 1426: CM received message from [facility] that they spoke to pt about considering going to their [facility] facility [facility]. Pt states she needs to discuss with her . 1433: CM met with pt at bedside to discuss dispo, which included possible alternative facilities as the ones chosen have yet to accept pt. Pt asked CM to speak with her daughter. 1438: CM spoke with pt daughter Mrs. Zhang and she stated the liaison from [facility] actually spoke with her about considering [facility]. Dtr states she would like to discuss with her  and make final decision tomorrow. CM explained that a decision would need to be made tomorrow as Vallorie Gum will need to be initiated. Dtr stated understanding. CM to f/u tomorrow.

## 2023-11-28 LAB
GLUCOSE BLD STRIP.AUTO-MCNC: 110 MG/DL (ref 65–100)
GLUCOSE BLD STRIP.AUTO-MCNC: 69 MG/DL (ref 65–100)
GLUCOSE BLD STRIP.AUTO-MCNC: 80 MG/DL (ref 65–100)
GLUCOSE BLD STRIP.AUTO-MCNC: 93 MG/DL (ref 65–100)
PERFORMED BY:: ABNORMAL
PERFORMED BY:: NORMAL

## 2023-11-28 PROCEDURE — 6370000000 HC RX 637 (ALT 250 FOR IP): Performed by: INTERNAL MEDICINE

## 2023-11-28 PROCEDURE — 97530 THERAPEUTIC ACTIVITIES: CPT

## 2023-11-28 PROCEDURE — 1100000000 HC RM PRIVATE

## 2023-11-28 PROCEDURE — 82962 GLUCOSE BLOOD TEST: CPT

## 2023-11-28 PROCEDURE — 6370000000 HC RX 637 (ALT 250 FOR IP): Performed by: FAMILY MEDICINE

## 2023-11-28 RX ADMIN — OXYCODONE HYDROCHLORIDE AND ACETAMINOPHEN 500 MG: 500 TABLET ORAL at 09:24

## 2023-11-28 RX ADMIN — OXYCODONE HYDROCHLORIDE AND ACETAMINOPHEN 1 TABLET: 5; 325 TABLET ORAL at 20:21

## 2023-11-28 RX ADMIN — CYANOCOBALAMIN TAB 1000 MCG 1000 MCG: 1000 TAB at 09:24

## 2023-11-28 RX ADMIN — FOLIC ACID 1 MG: 1 TABLET ORAL at 09:24

## 2023-11-28 RX ADMIN — OXYCODONE HYDROCHLORIDE AND ACETAMINOPHEN 1 TABLET: 5; 325 TABLET ORAL at 02:02

## 2023-11-28 RX ADMIN — LEVOTHYROXINE SODIUM 25 MCG: 0.03 TABLET ORAL at 06:19

## 2023-11-28 RX ADMIN — OXYCODONE HYDROCHLORIDE AND ACETAMINOPHEN 1 TABLET: 5; 325 TABLET ORAL at 06:19

## 2023-11-28 RX ADMIN — ASPIRIN 81 MG: 81 TABLET, COATED ORAL at 09:24

## 2023-11-28 RX ADMIN — CASTOR OIL AND BALSAM, PERU: 788; 87 OINTMENT TOPICAL at 09:24

## 2023-11-28 ASSESSMENT — PAIN SCALES - GENERAL
PAINLEVEL_OUTOF10: 2
PAINLEVEL_OUTOF10: 7
PAINLEVEL_OUTOF10: 2
PAINLEVEL_OUTOF10: 0
PAINLEVEL_OUTOF10: 7
PAINLEVEL_OUTOF10: 7

## 2023-11-28 ASSESSMENT — PAIN DESCRIPTION - DESCRIPTORS: DESCRIPTORS: ACHING;GNAWING;TIGHTNESS

## 2023-11-28 ASSESSMENT — PAIN DESCRIPTION - ORIENTATION
ORIENTATION: LEFT;RIGHT;LOWER
ORIENTATION: RIGHT;LOWER
ORIENTATION: RIGHT;LEFT;LOWER

## 2023-11-28 ASSESSMENT — PAIN - FUNCTIONAL ASSESSMENT
PAIN_FUNCTIONAL_ASSESSMENT: PREVENTS OR INTERFERES SOME ACTIVE ACTIVITIES AND ADLS

## 2023-11-28 ASSESSMENT — PAIN DESCRIPTION - LOCATION
LOCATION: LEG
LOCATION: BACK;KNEE
LOCATION: BACK;LEG;OTHER (COMMENT)

## 2023-11-28 NOTE — CARE COORDINATION
CM has reviewed pt chart    DCP: SNF pending choice; CM to get choice today. CM will need updated OT note for auth    1041: CM called daughter and she sates she does not want [facility] at all. CM updated Margie. CM did receive message from [facility] and they are reviewing, their  has been out of the office.

## 2023-11-29 LAB
GLUCOSE BLD STRIP.AUTO-MCNC: 106 MG/DL (ref 65–100)
GLUCOSE BLD STRIP.AUTO-MCNC: 71 MG/DL (ref 65–100)
GLUCOSE BLD STRIP.AUTO-MCNC: 82 MG/DL (ref 65–100)
GLUCOSE BLD STRIP.AUTO-MCNC: 87 MG/DL (ref 65–100)
GLUCOSE BLD STRIP.AUTO-MCNC: 93 MG/DL (ref 65–100)
PERFORMED BY:: ABNORMAL
PERFORMED BY:: NORMAL

## 2023-11-29 PROCEDURE — 82962 GLUCOSE BLOOD TEST: CPT

## 2023-11-29 PROCEDURE — 6370000000 HC RX 637 (ALT 250 FOR IP): Performed by: INTERNAL MEDICINE

## 2023-11-29 PROCEDURE — 1100000000 HC RM PRIVATE

## 2023-11-29 PROCEDURE — 6370000000 HC RX 637 (ALT 250 FOR IP): Performed by: FAMILY MEDICINE

## 2023-11-29 PROCEDURE — 6360000002 HC RX W HCPCS: Performed by: INTERNAL MEDICINE

## 2023-11-29 PROCEDURE — 97530 THERAPEUTIC ACTIVITIES: CPT

## 2023-11-29 RX ORDER — MIRTAZAPINE 15 MG/1
7.5 TABLET, FILM COATED ORAL NIGHTLY
Status: DISCONTINUED | OUTPATIENT
Start: 2023-11-29 | End: 2023-12-01 | Stop reason: HOSPADM

## 2023-11-29 RX ADMIN — OXYCODONE HYDROCHLORIDE AND ACETAMINOPHEN 1 TABLET: 5; 325 TABLET ORAL at 04:59

## 2023-11-29 RX ADMIN — LEVOTHYROXINE SODIUM 25 MCG: 0.03 TABLET ORAL at 04:51

## 2023-11-29 RX ADMIN — EPOETIN ALFA-EPBX 20000 UNITS: 10000 INJECTION, SOLUTION INTRAVENOUS; SUBCUTANEOUS at 17:10

## 2023-11-29 RX ADMIN — CYANOCOBALAMIN TAB 1000 MCG 1000 MCG: 1000 TAB at 10:01

## 2023-11-29 RX ADMIN — CASTOR OIL AND BALSAM, PERU: 788; 87 OINTMENT TOPICAL at 12:02

## 2023-11-29 RX ADMIN — OXYCODONE HYDROCHLORIDE AND ACETAMINOPHEN 1 TABLET: 5; 325 TABLET ORAL at 10:01

## 2023-11-29 RX ADMIN — MIRTAZAPINE 7.5 MG: 15 TABLET, FILM COATED ORAL at 22:44

## 2023-11-29 RX ADMIN — OXYCODONE HYDROCHLORIDE AND ACETAMINOPHEN 500 MG: 500 TABLET ORAL at 10:00

## 2023-11-29 RX ADMIN — ASPIRIN 81 MG: 81 TABLET, COATED ORAL at 10:01

## 2023-11-29 RX ADMIN — OXYCODONE HYDROCHLORIDE AND ACETAMINOPHEN 1 TABLET: 5; 325 TABLET ORAL at 20:22

## 2023-11-29 RX ADMIN — FOLIC ACID 1 MG: 1 TABLET ORAL at 10:00

## 2023-11-29 ASSESSMENT — PAIN DESCRIPTION - LOCATION
LOCATION: GENERALIZED
LOCATION: LEG

## 2023-11-29 ASSESSMENT — PAIN DESCRIPTION - ORIENTATION
ORIENTATION: OTHER (COMMENT)
ORIENTATION: RIGHT;LOWER

## 2023-11-29 ASSESSMENT — PAIN SCALES - GENERAL
PAINLEVEL_OUTOF10: 3
PAINLEVEL_OUTOF10: 2
PAINLEVEL_OUTOF10: 8
PAINLEVEL_OUTOF10: 0
PAINLEVEL_OUTOF10: 7

## 2023-11-29 NOTE — CARE COORDINATION
CM has reviewed pt chart    DCP: [facility]    4253: CM sent message to [facility] informing of updated therapy notes attached and requested auth to start. [facility] responded saying auth to be started today.

## 2023-11-30 LAB
BASOPHILS # BLD: 0 K/UL (ref 0–0.1)
BASOPHILS NFR BLD: 0 % (ref 0–1)
CRP SERPL-MCNC: 6.64 MG/DL (ref 0–0.6)
DIFFERENTIAL METHOD BLD: ABNORMAL
EOSINOPHIL # BLD: 0 K/UL (ref 0–0.4)
EOSINOPHIL NFR BLD: 0 % (ref 0–7)
ERYTHROCYTE [DISTWIDTH] IN BLOOD BY AUTOMATED COUNT: 26.5 % (ref 11.5–14.5)
GLUCOSE BLD STRIP.AUTO-MCNC: 106 MG/DL (ref 65–100)
GLUCOSE BLD STRIP.AUTO-MCNC: 90 MG/DL (ref 65–100)
GLUCOSE BLD STRIP.AUTO-MCNC: 92 MG/DL (ref 65–100)
HCT VFR BLD AUTO: 24.4 % (ref 35–47)
HGB BLD-MCNC: 7.3 G/DL (ref 11.5–16)
IMM GRANULOCYTES # BLD AUTO: 0.1 K/UL (ref 0–0.04)
IMM GRANULOCYTES NFR BLD AUTO: 1 % (ref 0–0.5)
LYMPHOCYTES # BLD: 1.9 K/UL (ref 0.8–3.5)
LYMPHOCYTES NFR BLD: 21 % (ref 12–49)
MCH RBC QN AUTO: 26.5 PG (ref 26–34)
MCHC RBC AUTO-ENTMCNC: 29.9 G/DL (ref 30–36.5)
MCV RBC AUTO: 88.7 FL (ref 80–99)
MONOCYTES # BLD: 0.6 K/UL (ref 0–1)
MONOCYTES NFR BLD: 7 % (ref 5–13)
NEUTS SEG # BLD: 6.6 K/UL (ref 1.8–8)
NEUTS SEG NFR BLD: 71 % (ref 32–75)
NRBC # BLD: 0.08 K/UL (ref 0–0.01)
NRBC BLD-RTO: 0.9 PER 100 WBC
PERFORMED BY:: ABNORMAL
PERFORMED BY:: NORMAL
PERFORMED BY:: NORMAL
PLATELET # BLD AUTO: 447 K/UL (ref 150–400)
PROCALCITONIN SERPL-MCNC: 0.7 NG/ML
RBC # BLD AUTO: 2.75 M/UL (ref 3.8–5.2)
RBC MORPH BLD: ABNORMAL
WBC # BLD AUTO: 9.2 K/UL (ref 3.6–11)

## 2023-11-30 PROCEDURE — 82962 GLUCOSE BLOOD TEST: CPT

## 2023-11-30 PROCEDURE — 86140 C-REACTIVE PROTEIN: CPT

## 2023-11-30 PROCEDURE — 84145 PROCALCITONIN (PCT): CPT

## 2023-11-30 PROCEDURE — 6370000000 HC RX 637 (ALT 250 FOR IP): Performed by: INTERNAL MEDICINE

## 2023-11-30 PROCEDURE — 36415 COLL VENOUS BLD VENIPUNCTURE: CPT

## 2023-11-30 PROCEDURE — 6370000000 HC RX 637 (ALT 250 FOR IP): Performed by: FAMILY MEDICINE

## 2023-11-30 PROCEDURE — 85025 COMPLETE CBC W/AUTO DIFF WBC: CPT

## 2023-11-30 PROCEDURE — 1100000000 HC RM PRIVATE

## 2023-11-30 RX ADMIN — OXYCODONE HYDROCHLORIDE AND ACETAMINOPHEN 1 TABLET: 5; 325 TABLET ORAL at 09:18

## 2023-11-30 RX ADMIN — ASPIRIN 81 MG: 81 TABLET, COATED ORAL at 08:53

## 2023-11-30 RX ADMIN — OXYCODONE HYDROCHLORIDE AND ACETAMINOPHEN 500 MG: 500 TABLET ORAL at 08:53

## 2023-11-30 RX ADMIN — OXYCODONE HYDROCHLORIDE AND ACETAMINOPHEN 1 TABLET: 5; 325 TABLET ORAL at 06:15

## 2023-11-30 RX ADMIN — OXYCODONE HYDROCHLORIDE AND ACETAMINOPHEN 1 TABLET: 5; 325 TABLET ORAL at 19:51

## 2023-11-30 RX ADMIN — ONDANSETRON 4 MG: 4 TABLET, ORALLY DISINTEGRATING ORAL at 06:15

## 2023-11-30 RX ADMIN — FOLIC ACID 1 MG: 1 TABLET ORAL at 08:53

## 2023-11-30 RX ADMIN — ONDANSETRON 4 MG: 4 TABLET, ORALLY DISINTEGRATING ORAL at 19:51

## 2023-11-30 RX ADMIN — CYANOCOBALAMIN TAB 1000 MCG 1000 MCG: 1000 TAB at 08:53

## 2023-11-30 RX ADMIN — MIRTAZAPINE 7.5 MG: 15 TABLET, FILM COATED ORAL at 19:51

## 2023-11-30 RX ADMIN — LEVOTHYROXINE SODIUM 25 MCG: 0.03 TABLET ORAL at 06:15

## 2023-11-30 RX ADMIN — CASTOR OIL AND BALSAM, PERU: 788; 87 OINTMENT TOPICAL at 09:25

## 2023-11-30 ASSESSMENT — PAIN SCALES - GENERAL
PAINLEVEL_OUTOF10: 7
PAINLEVEL_OUTOF10: 0
PAINLEVEL_OUTOF10: 2
PAINLEVEL_OUTOF10: 0
PAINLEVEL_OUTOF10: 8
PAINLEVEL_OUTOF10: 7
PAINLEVEL_OUTOF10: 2

## 2023-11-30 ASSESSMENT — PAIN - FUNCTIONAL ASSESSMENT
PAIN_FUNCTIONAL_ASSESSMENT: PREVENTS OR INTERFERES SOME ACTIVE ACTIVITIES AND ADLS

## 2023-11-30 ASSESSMENT — PAIN DESCRIPTION - LOCATION
LOCATION: GENERALIZED
LOCATION: LEG
LOCATION: GENERALIZED

## 2023-11-30 ASSESSMENT — PAIN DESCRIPTION - DESCRIPTORS: DESCRIPTORS: DISCOMFORT;SORE

## 2023-11-30 ASSESSMENT — PAIN DESCRIPTION - ORIENTATION
ORIENTATION: OTHER (COMMENT)
ORIENTATION: RIGHT
ORIENTATION: OTHER (COMMENT)

## 2023-11-30 NOTE — CARE COORDINATION
DC Plan: SNF    [facility] liaison approached Cm at the nurses station. Apparently, they got Melanee Bump. [facility] also received auth. Pt's plan of care was discussed. Cm spoke with Dr. Azalia Rick and read the nephrologist note from today regarding pt's food intake, peg consideration, and possible hospice eval. After much discussion, attending indicated pt is cleared to discharge pending ID clearance. CM spoke with ID and she cleared the pt. Cm notified attending that ID cleared and he indicated pt can discharge today. Cm attempted to contact pt's daughter - Tanya Loya [phone] to see which facility she wanted her mother discharged to. Cm left a voice message. Awaiting phone call. 1442    Cm attempted to contact pt's daughter - Tanya Loya again. No answer. 1608    Cm called pt's daughter again. Daughter answered. Cm informed daughter [facility]. Cm explained to daughter pt is cleared for discharge and asked which facility she wanted her mother going to. Apparently, family reached out to [facility]. Family wanted to know if they can take their mother out of [facility] anytime. Cm explained to them Cm will reach out to [facility] admission and have them contact them to answer their question. At this time, disposition is pending (SNF vs hospice). Family is in agreement with CM contacting them tomorrow for their decision. Cm spoke with Dr. Azalia Rick and updated him on pt's dc plan. Cm called [facility] and asked to speak with admission. Cm was informed admissions is gone for the day. Cm to f/up with family tomorrow for their decision SNF vs hospice.

## 2023-12-01 VITALS
TEMPERATURE: 97.5 F | WEIGHT: 120 LBS | SYSTOLIC BLOOD PRESSURE: 111 MMHG | BODY MASS INDEX: 21.26 KG/M2 | HEIGHT: 63 IN | HEART RATE: 87 BPM | DIASTOLIC BLOOD PRESSURE: 76 MMHG | RESPIRATION RATE: 18 BRPM | OXYGEN SATURATION: 100 %

## 2023-12-01 LAB
GLUCOSE BLD STRIP.AUTO-MCNC: 108 MG/DL (ref 65–100)
GLUCOSE BLD STRIP.AUTO-MCNC: 80 MG/DL (ref 65–100)
PERFORMED BY:: ABNORMAL
PERFORMED BY:: NORMAL

## 2023-12-01 PROCEDURE — 82962 GLUCOSE BLOOD TEST: CPT

## 2023-12-01 PROCEDURE — 6370000000 HC RX 637 (ALT 250 FOR IP): Performed by: INTERNAL MEDICINE

## 2023-12-01 PROCEDURE — 6370000000 HC RX 637 (ALT 250 FOR IP): Performed by: FAMILY MEDICINE

## 2023-12-01 RX ADMIN — ASPIRIN 81 MG: 81 TABLET, COATED ORAL at 09:31

## 2023-12-01 RX ADMIN — FOLIC ACID 1 MG: 1 TABLET ORAL at 09:32

## 2023-12-01 RX ADMIN — CYANOCOBALAMIN TAB 1000 MCG 1000 MCG: 1000 TAB at 09:31

## 2023-12-01 RX ADMIN — OXYCODONE HYDROCHLORIDE AND ACETAMINOPHEN 500 MG: 500 TABLET ORAL at 09:31

## 2023-12-01 RX ADMIN — LEVOTHYROXINE SODIUM 25 MCG: 0.03 TABLET ORAL at 05:38

## 2023-12-01 RX ADMIN — OXYCODONE HYDROCHLORIDE AND ACETAMINOPHEN 1 TABLET: 5; 325 TABLET ORAL at 05:38

## 2023-12-01 RX ADMIN — OXYCODONE HYDROCHLORIDE AND ACETAMINOPHEN 1 TABLET: 5; 325 TABLET ORAL at 11:40

## 2023-12-01 RX ADMIN — CASTOR OIL AND BALSAM, PERU: 788; 87 OINTMENT TOPICAL at 09:55

## 2023-12-01 RX ADMIN — OXYCODONE HYDROCHLORIDE AND ACETAMINOPHEN 1 TABLET: 5; 325 TABLET ORAL at 00:19

## 2023-12-01 ASSESSMENT — PAIN SCALES - GENERAL
PAINLEVEL_OUTOF10: 2
PAINLEVEL_OUTOF10: 7
PAINLEVEL_OUTOF10: 7
PAINLEVEL_OUTOF10: 0
PAINLEVEL_OUTOF10: 7

## 2023-12-01 ASSESSMENT — PAIN - FUNCTIONAL ASSESSMENT
PAIN_FUNCTIONAL_ASSESSMENT: PREVENTS OR INTERFERES SOME ACTIVE ACTIVITIES AND ADLS

## 2023-12-01 ASSESSMENT — PAIN DESCRIPTION - ORIENTATION
ORIENTATION: RIGHT
ORIENTATION: RIGHT;LOWER
ORIENTATION: OTHER (COMMENT)

## 2023-12-01 ASSESSMENT — PAIN DESCRIPTION - LOCATION
LOCATION: LEG
LOCATION: NECK
LOCATION: GENERALIZED

## 2023-12-01 ASSESSMENT — PAIN DESCRIPTION - DESCRIPTORS: DESCRIPTORS: DISCOMFORT

## 2023-12-01 ASSESSMENT — PAIN SCALES - WONG BAKER: WONGBAKER_NUMERICALRESPONSE: 2

## 2023-12-01 NOTE — CARE COORDINATION
DCP: [facility]    DC Transport: [facility] 1:00pm pickup time      Primary Nurse can call report to [phone], no room assignment at this time. Transition of Care Plan:    RUR: 15%  Prior Level of Functioning: HH with [facility]  Disposition: [facility]  If SNF or IPR: Date FOC offered: 11/20  Date [address] received: 11/28  Accepting facility[facility]  Date authorization started with reference number: 11/29  Date authorization received and expires: 11/30  Follow up appointments: unit secretary to setup as needed  DME needed: none  Transportation at discharge: [facility]  IM/IMM Medicare/ letter given: yes  Is patient a  and connected with VA? no   If yes, was [employer] transfer form completed and VA notified? N/a  Caregiver Contact: Daughter Tanya Loya  Discharge Caregiver contacted prior to discharge?  yes  Care Conference needed? no  Barriers to discharge: none

## 2023-12-01 NOTE — DISCHARGE SUMMARY
Discharge Summary    Diane Ravi  :  1933  MRN:  [id number]    ADMIT DATE:  2023  DISCHARGE DATE:  2023    PRIMARY CARE PHYSICIAN:  Jorge Dunn MD    VISIT STATUS: Admission    CODE STATUS:  DNR    DISCHARGE DIAGNOSES:  Principal Problem:    Sepsis ([address])  Active Problems:    Troponin level elevated    Severe protein-calorie malnutrition ([address])    Skin ulcer of sacrum (HCC)    Ulcer of right heel and midfoot (HCC)    Ulcer of left heel and midfoot (HCC)    Hypernatremia    Failure to thrive in adult    Anemia  Resolved Problems:    * No resolved hospital problems. *      HOSPITAL COURSE:  80-year-old PMH history  of hypertension functional decline with failure to thrive admitted for acute kidney injury, sepsis while intubation acute metabolic encephalopathy infected ulcers on the foot bilateral elevated sodium 127 mentation patient was started on hypotonic IV fluids for now empiric IV antibiotics and antioxidants patient was seen by nephrologist, infectious disease, podiatrist and cardiologist she was admitted to the ICU improved with treatment and transferred to the floor hospice was discussed with the patient was also anemic but does not want blood transfusion forreligious reasons. Patient be discharged with available bed    Patient's family decided on skilled nursing facility placement change mind on home hospice care. Case management working for placement    Waiting for placement by case management  Pt/ot placed  SIGNIFICANT DIAGNOSTIC STUDIES:    CONSULTANTS:    RECOMMENDED NEXT STEPS:   Hospice if family accepts     DISCHARGE MEDICATIONS:         Medication List        START taking these medications      amoxicillin-clavulanate 500-125 MG per tablet  Commonly known as: Augmentin  Take 1 tablet by mouth in the morning and 1 tablet in the evening. Do all this for 10 days.      ascorbic acid 500 MG tablet  Commonly known as: VITAMIN C  Take 1 tablet by mouth daily     aspirin 81
Discharge Summary    Ilene Garcia  :  1933  MRN:  [id number]    ADMIT DATE:  2023  DISCHARGE DATE:  2023    PRIMARY CARE PHYSICIAN:  Jaycob Atwood MD    VISIT STATUS: Admission    CODE STATUS:  DNR    DISCHARGE DIAGNOSES:  Principal Problem:    Sepsis ([address])  Active Problems:    Troponin level elevated    Severe protein-calorie malnutrition ([address])    Skin ulcer of sacrum (HCC)    Ulcer of right heel and midfoot (HCC)    Ulcer of left heel and midfoot (HCC)    Hypernatremia    Failure to thrive in adult    Anemia  Resolved Problems:    * No resolved hospital problems. *      HOSPITAL COURSE:  69-year-old PMH history  of hypertension functional decline with failure to thrive admitted for acute kidney injury, sepsis while intubation acute metabolic encephalopathy infected ulcers on the foot bilateral elevated sodium 127 mentation patient was started on hypotonic IV fluids for now empiric IV antibiotics and antioxidants patient was seen by nephrologist, infectious disease, podiatrist and cardiologist she was admitted to the ICU improved with treatment and transferred to the floor hospice was discussed with the patient was also anemic but does not want blood transfusion forreligious reasons. Patient be discharged with available bed    Patient's family decided on skilled nursing facility placement change mind on home hospice care.   Case management working for placement    Waiting for placement by case management  Pt/ot placed     arranging for placement no new events    Placement pending   discharge pending    Placement pending    Placement pending    Placement pending    Family requested for hospice after initially refusing and declined hospice and requested for skilled nursing facility with recent approval for skilled nursing facility  Dec 1  For placement  SIGNIFICANT DIAGNOSTIC STUDIES:    CONSULTANTS:    RECOMMENDED NEXT STEPS:   Hospice if
Discharge Summary    Lucille Ndiaye  :  1933  MRN:  [id number]    ADMIT DATE:  2023  DISCHARGE DATE:  2023    PRIMARY CARE PHYSICIAN:  Tommy Hammond MD    VISIT STATUS: Admission    CODE STATUS:  DNR    DISCHARGE DIAGNOSES:  Principal Problem:    Sepsis ([address])  Active Problems:    Troponin level elevated    Severe protein-calorie malnutrition ([address])    Skin ulcer of sacrum (HCC)    Ulcer of right heel and midfoot (HCC)    Ulcer of left heel and midfoot (HCC)    Hypernatremia    Failure to thrive in adult    Anemia  Resolved Problems:    * No resolved hospital problems. *      HOSPITAL COURSE:  66-year-old PMH history  of hypertension functional decline with failure to thrive admitted for acute kidney injury, sepsis while intubation acute metabolic encephalopathy infected ulcers on the foot bilateral elevated sodium 127 mentation patient was started on hypotonic IV fluids for now empiric IV antibiotics and antioxidants patient was seen by nephrologist, infectious disease, podiatrist and cardiologist she was admitted to the ICU improved with treatment and transferred to the floor hospice was discussed with the patient was also anemic but does not want blood transfusion forreligious reasons. Patient be discharged with available bed    Patient's family decided on skilled nursing facility placement change mind on home hospice care. Case management working for placement  SIGNIFICANT DIAGNOSTIC STUDIES:    CONSULTANTS:    RECOMMENDED NEXT STEPS:   Hospice if family accepts     DISCHARGE MEDICATIONS:         Medication List        START taking these medications      amoxicillin-clavulanate 500-125 MG per tablet  Commonly known as: Augmentin  Take 1 tablet by mouth in the morning and 1 tablet in the evening. Do all this for 10 days.      ascorbic acid 500 MG tablet  Commonly known as: VITAMIN C  Take 1 tablet by mouth daily     aspirin 81 MG EC tablet  Take 1 tablet by mouth daily     cyanocobalamin
Discharge Summary    Mirta Freeman  :  1933  MRN:  [id number]    ADMIT DATE:  2023  DISCHARGE DATE:  2023    PRIMARY CARE PHYSICIAN:  Miguel Skelton MD    VISIT STATUS: Admission    CODE STATUS:  DNR    DISCHARGE DIAGNOSES:  Principal Problem:    Sepsis ([address])  Active Problems:    Troponin level elevated    Severe protein-calorie malnutrition ([address])    Skin ulcer of sacrum (HCC)    Ulcer of right heel and midfoot (HCC)    Ulcer of left heel and midfoot (HCC)    Hypernatremia    Failure to thrive in adult    Anemia  Resolved Problems:    * No resolved hospital problems. *      HOSPITAL COURSE:  63-year-old PMH history  of hypertension functional decline with failure to thrive admitted for acute kidney injury, sepsis while intubation acute metabolic encephalopathy infected ulcers on the foot bilateral elevated sodium 127 mentation patient was started on hypotonic IV fluids for now empiric IV antibiotics and antioxidants patient was seen by nephrologist, infectious disease, podiatrist and cardiologist she was admitted to the ICU improved with treatment and transferred to the floor hospice was discussed with the patient was also anemic but does not want blood transfusion forreligious reasons. Patient be discharged with available bed    Patient's family decided on skilled nursing facility placement change mind on home hospice care. Case management working for placement    Waiting for placement by case management  Pt/ot placed     arranging for placement no new events    Placement pending  SIGNIFICANT DIAGNOSTIC STUDIES:    CONSULTANTS:    RECOMMENDED NEXT STEPS:   Hospice if family accepts     DISCHARGE MEDICATIONS:         Medication List        START taking these medications      amoxicillin-clavulanate 500-125 MG per tablet  Commonly known as: Augmentin  Take 1 tablet by mouth in the morning and 1 tablet in the evening. Do all this for 10 days.      ascorbic acid 500
Discharge Summary    Nhan Mullins  :  1933  MRN:  [id number]    ADMIT DATE:  2023  DISCHARGE DATE:  2023    PRIMARY CARE PHYSICIAN:  Yumiko Damon MD    VISIT STATUS: Admission    CODE STATUS:  DNR    DISCHARGE DIAGNOSES:  Principal Problem:    Sepsis ([address])  Active Problems:    Troponin level elevated    Severe protein-calorie malnutrition ([address])    Skin ulcer of sacrum (HCC)    Ulcer of right heel and midfoot (HCC)    Ulcer of left heel and midfoot (HCC)    Hypernatremia    Failure to thrive in adult    Anemia  Resolved Problems:    * No resolved hospital problems. *      HOSPITAL COURSE:  80-year-old PMH history  of hypertension functional decline with failure to thrive admitted for acute kidney injury, sepsis while intubation acute metabolic encephalopathy infected ulcers on the foot bilateral elevated sodium 127 mentation patient was started on hypotonic IV fluids for now empiric IV antibiotics and antioxidants patient was seen by nephrologist, infectious disease, podiatrist and cardiologist she was admitted to the ICU improved with treatment and transferred to the floor hospice was discussed with the patient was also anemic but does not want blood transfusion forreligious reasons. Patient be discharged with available bed    Patient's family decided on skilled nursing facility placement change mind on home hospice care. Case management working for placement    Waiting for placement by case management  Pt/ot placed     arranging for placement no new events    Placement pending   discharge pending    Placement pending  SIGNIFICANT DIAGNOSTIC STUDIES:    CONSULTANTS:    RECOMMENDED NEXT STEPS:   Hospice if family accepts     DISCHARGE MEDICATIONS:         Medication List        START taking these medications      amoxicillin-clavulanate 500-125 MG per tablet  Commonly known as:  Augmentin  Take 1 tablet by mouth in the morning and 1 tablet in the
Discharge Summary    Renetta Harding  :  1933  MRN:  [id number]    ADMIT DATE:  2023  DISCHARGE DATE:  2023    PRIMARY CARE PHYSICIAN:  Ave Sandhoff, MD    VISIT STATUS: Admission    CODE STATUS:  DNR    DISCHARGE DIAGNOSES:  Principal Problem:    Sepsis ([address])  Active Problems:    Troponin level elevated    Severe protein-calorie malnutrition ([address])    Skin ulcer of sacrum (HCC)    Ulcer of right heel and midfoot (HCC)    Ulcer of left heel and midfoot (HCC)    Hypernatremia    Failure to thrive in adult    Anemia  Resolved Problems:    * No resolved hospital problems. *      HOSPITAL COURSE:  80-year-old PMH history  of hypertension functional decline with failure to thrive admitted for acute kidney injury, sepsis while intubation acute metabolic encephalopathy infected ulcers on the foot bilateral elevated sodium 127 mentation patient was started on hypotonic IV fluids for now empiric IV antibiotics and antioxidants patient was seen by nephrologist, infectious disease, podiatrist and cardiologist she was admitted to the ICU improved with treatment and transferred to the floor hospice was discussed with the patient was also anemic but does not want blood transfusion forreligious reasons. Patient be discharged with available bed    Patient's family decided on skilled nursing facility placement change mind on home hospice care. Case management working for placement    Waiting for placement by case management  Pt/ot placed     arranging for placement no new events    Placement pending   discharge pending    Placement pending    Placement pending    Placement pending  SIGNIFICANT DIAGNOSTIC STUDIES:    CONSULTANTS:    RECOMMENDED NEXT STEPS:   Hospice if family accepts     DISCHARGE MEDICATIONS:         Medication List        START taking these medications      amoxicillin-clavulanate 500-125 MG per tablet  Commonly known as:  Augmentin  Take 1
Discharge Summary    Socorro Yoon  :  1933  MRN:  [id number]    ADMIT DATE:  2023  DISCHARGE DATE:  2023    PRIMARY CARE PHYSICIAN:  Guille Jones MD    VISIT STATUS: Admission    CODE STATUS:  DNR    DISCHARGE DIAGNOSES:  Principal Problem:    Sepsis ([address])  Active Problems:    Troponin level elevated    Severe protein-calorie malnutrition ([address])    Skin ulcer of sacrum (HCC)    Ulcer of right heel and midfoot (HCC)    Ulcer of left heel and midfoot (HCC)    Hypernatremia    Failure to thrive in adult    Anemia  Resolved Problems:    * No resolved hospital problems. *      HOSPITAL COURSE:  51-year-old PMH history  of hypertension functional decline with failure to thrive admitted for acute kidney injury, sepsis while intubation acute metabolic encephalopathy infected ulcers on the foot bilateral elevated sodium 127 mentation patient was started on hypotonic IV fluids for now empiric IV antibiotics and antioxidants patient was seen by nephrologist, infectious disease, podiatrist and cardiologist she was admitted to the ICU improved with treatment and transferred to the floor hospice was discussed with the patient was also anemic but does not want blood transfusion forreligious reasons. Patient be discharged with available bed  SIGNIFICANT DIAGNOSTIC STUDIES:    CONSULTANTS:    RECOMMENDED NEXT STEPS:   Hospice if family accepts     DISCHARGE MEDICATIONS:         Medication List        START taking these medications      amoxicillin-clavulanate 500-125 MG per tablet  Commonly known as: Augmentin  Take 1 tablet by mouth in the morning and 1 tablet in the evening. Do all this for 10 days.      ascorbic acid 500 MG tablet  Commonly known as: VITAMIN C  Take 1 tablet by mouth daily     aspirin 81 MG EC tablet  Take 1 tablet by mouth daily     cyanocobalamin 1000 MCG tablet  Take 1 tablet by mouth daily     epoetin oyan-epbx 73924 UNIT/ML Soln injection  Commonly known as: RETACRIT  Inject 1 mL into
Discharge Summary    Ying Sanchez  :  1933  MRN:  [id number]    ADMIT DATE:  2023  DISCHARGE DATE:  2023    PRIMARY CARE PHYSICIAN:  Gabriel Moses MD    VISIT STATUS: Admission    CODE STATUS:  DNR    DISCHARGE DIAGNOSES:  Principal Problem:    Sepsis ([address])  Active Problems:    Troponin level elevated    Severe protein-calorie malnutrition ([address])    Skin ulcer of sacrum (HCC)    Ulcer of right heel and midfoot (HCC)    Ulcer of left heel and midfoot (HCC)    Hypernatremia    Failure to thrive in adult    Anemia  Resolved Problems:    * No resolved hospital problems. *      HOSPITAL COURSE:  66-year-old PMH history  of hypertension functional decline with failure to thrive admitted for acute kidney injury, sepsis while intubation acute metabolic encephalopathy infected ulcers on the foot bilateral elevated sodium 127 mentation patient was started on hypotonic IV fluids for now empiric IV antibiotics and antioxidants patient was seen by nephrologist, infectious disease, podiatrist and cardiologist she was admitted to the ICU improved with treatment and transferred to the floor hospice was discussed with the patient was also anemic but does not want blood transfusion forreligious reasons. Patient be discharged with available bed    Patient's family decided on skilled nursing facility placement change mind on home hospice care. Case management working for placement    Waiting for placement by case management  Pt/ot placed     arranging for placement no new events  SIGNIFICANT DIAGNOSTIC STUDIES:    CONSULTANTS:    RECOMMENDED NEXT STEPS:   Hospice if family accepts     DISCHARGE MEDICATIONS:         Medication List        START taking these medications      amoxicillin-clavulanate 500-125 MG per tablet  Commonly known as: Augmentin  Take 1 tablet by mouth in the morning and 1 tablet in the evening. Do all this for 10 days.      ascorbic acid 500 MG tablet  Commonly known
days.     ascorbic acid 500 MG tablet  Commonly known as: VITAMIN C  Take 1 tablet by mouth daily     aspirin 81 MG EC tablet  Take 1 tablet by mouth daily     cyanocobalamin 1000 MCG tablet  Take 1 tablet by mouth daily     epoetin yoan-epbx 52137 UNIT/ML Soln injection  Commonly known as: RETACRIT  Inject 1 mL into the skin daily     ferrous sulfate 325 (65 Fe) MG tablet  Commonly known as: IRON 487     folic acid 1 MG tablet  Commonly known as: FOLVITE  Take 1 tablet by mouth daily     mineral oil-hydrophilic petrolatum ointment  Apply topically as needed. CHANGE how you take these medications      levothyroxine 25 MCG tablet  Commonly known as: SYNTHROID  1 tablet by Per NG tube route every morning (before breakfast)  What changed:   medication strength  See the new instructions. Another medication with the same name was removed. Continue taking this medication, and follow the directions you see here.             CONTINUE taking these medications      calcium carbonate 500 MG chewable tablet  Commonly known as: TUMS     traZODone 50 MG tablet  Commonly known as: DESYREL            STOP taking these medications      albuterol (5 MG/ML) 0.5% nebulizer solution  Commonly known as: PROVENTIL     Albuterol Sulfate (sensor) 108 (90 Base) MCG/ACT Aepb     amLODIPine 2.5 MG tablet  Commonly known as: NORVASC     apixaban 5 MG Tabs tablet  Commonly known as: ELIQUIS     atorvastatin 80 MG tablet  Commonly known as: LIPITOR     bisacodyl 10 MG suppository  Commonly known as: DULCOLAX     diclofenac sodium 1 % Gel  Commonly known as: VOLTAREN     docusate sodium 283 MG enema  Commonly known as: ENEMEEZ     hydrALAZINE 25 MG tablet  Commonly known as: APRESOLINE     lidocaine 5 %  Commonly known as: LIDODERM     melatonin 3 MG Tabs tablet     methocarbamol 500 MG tablet  Commonly known as: ROBAXIN     mirtazapine 45 MG tablet  Commonly known as: REMERON     naloxone 0.4 MG/ML injection  Commonly known as:
Moderate Complexity: follow up within 7-14 calendar days (71753)   [] Severe Complexity: follow up within 7 calendar days (46188)    FOLLOW UP TESTING, PENDING RESULTS OR REFERRALS AT TRANSITIONAL CARE VISIT:   []  Yes    []  No    PENDING STUDIES:       DISPOSITION: [address] NAME:     [facility] [address] [facility] [address]  Follow up in 1 week(s)  For wound re-check with Dr. Janel Dobbins   on     [address] MA/SW: Please call patient on day after discharge (must document patient  contacted within 2 business days of discharge). FOLLOW UP QUESTIONS FOR MA/SW:  1. Did you get medications filled and taking them as instructed from discharge? 2. Are you following your discharge instructions from your hospital stay? 3. Please confirm patient is scheduled for a follow up appointment within the above time frame.     DISCHARGE TIME: > 30 minutes    SIGNED:  Delfin Piña MD   11/30/2023, 6:16 PM
Yes    []  No    PENDING STUDIES:       DISPOSITION: Hospice Facility    FACILITY/HOME CARE AGENCY NAME:     Follow up with No follow-up provider specified. on     INSTRUCTIONS TO MA/SW: Please call patient on day after discharge (must document patient  contacted within 2 business days of discharge). FOLLOW UP QUESTIONS FOR MA/SW:  1. Did you get medications filled and taking them as instructed from discharge? 2. Are you following your discharge instructions from your hospital stay? 3. Please confirm patient is scheduled for a follow up appointment within the above time frame.     DISCHARGE TIME: > 30 minutes    SIGNED:  Alex Holman MD   11/27/2023, 5:45 PM

## 2023-12-01 NOTE — CARE COORDINATION
CM has reviewed pt chart    DCP: pt has auth at [facility] and [facility]; CM to call daughter today to discuss final dispo, they are considering hospice    0: CM spoke with pt daughter and she is requesting pt go to [facility]. CM to setup transportation. 1030: CM sent message to [facility] to see what time they would like pt to come over today, awaiting reply. 1039: CM called [facility] to speak to admissions, they are not available.  CM left contact number and pt name with .

## 2024-03-27 NOTE — ADT AUTH CERT NOTES
CLINICAL FAX #[phone]    Dehydration - Care Day 4 (3/10/2022) by Donnell Womack RN       Review Entered Review Status   3/10/2022 15:51 Completed      Criteria Review      Care Day: 4 Care Date: 3/10/2022 Level of Care: Inpatient Floor    Guideline Day 2    Clinical Status    ( ) * Hemodynamic stability    ( ) * Mental status at baseline    ( ) * Vomiting absent or controlled    ( ) * Diarrhea absent or controlled    ( ) * Electrolyte abnormalities absent or acceptable for next level of care    ( ) * Cause of dehydration requiring inpatient treatment absent    ( ) * Renal function at baseline or acceptable for next level of care    ( ) * Discharge plans and education understood    Activity    ( ) * Ambulatory or acceptable for next level of care    Routes    ( ) * Oral hydration    ( ) * Oral medications or regimen acceptable for next level of care    ( ) * Oral diet or acceptable for next level of care    * Milestone   Additional Notes   3/10/2022      ATTENDING NOTE:       Seen and examined. Reports that she is feeling well. Eating better and drinking better. Mostly in bed still      DIAGNOSES:   1. Acute injury, grade 2 on CKD   2. CKD baseline not known   3. History of hypertension   4. Chronic anemia   5. Sepsis/leukocytosis-source not clear   6. Hypomagnesemia-mild   7. Metabolic acidosis, mild   8. Hyperkalemia, mild   DISCUSSION:   · Improvement of renal function-mild   · Evidence of hyperthyroidism   · Renal ultrasound findings of 8.1 cm kidney/8.6 cm kidney suggest chronicity.    · Most likely cause of CKD  is hypertensive nephrosclerosis but could also be chronic NSAID use       PLAN:   Anemia work-up underway   Will discontinue IV fluids      Visit Vitals   /83 (BP 1 Location: Left upper arm, BP Patient Position: At rest;Supine)   Pulse 86   Temp 97.6 °F (36.4 °C)   Resp 16   Ht 5' 4\" (1.626 m)   Wt 54.4 kg (120 lb)   SpO2 95%   BMI 20.60 kg/m²       Physical Exam   Vitals and nursing [FreeTextEntry1] : If you feel like you have an infection it is important for you to call our office and we will arrange testing of your urine.  Please continue taking Flexeril 5 mg as needed  We will contact you if the urine results are abnormal.  Please call my office if you have any issues with the cost or side effects of the medication.   Schedule a 6-8 weeks for PVR check with FADI Alejandro note reviewed. Constitutional:        Appearance: Normal appearance. She is normal weight. HENT:       Head: Normocephalic and atraumatic.       Nose: Nose normal.       Mouth/Throat:       Mouth: Mucous membranes are moist.       Pharynx: Oropharynx is clear. Eyes:       Conjunctiva/sclera: Conjunctivae normal.    Cardiovascular:       Rate and Rhythm: Regular rhythm. Tachycardia present.       Pulses: Normal pulses.       Heart sounds: Normal heart sounds. Pulmonary:       Effort: Pulmonary effort is normal.       Breath sounds: Normal breath sounds. Abdominal:       General: Abdomen is flat.       Palpations: Abdomen is soft. Skin:      General: Skin is warm and dry.       Coloration: Skin is not pale.       Findings: No erythema. Neurological:       General: No focal deficit present.       Mental Status: She is alert and oriented to person, place, and time.     Psychiatric:          Mood and Affect: Mood normal.          Behavior: Behavior normal.       MEDS:   Current Facility-Administered Medications   Medication Dose Route Frequency Provider Last Rate Last Admin   · 0.9% sodium chloride infusion 50 mL/hr IntraVENous CONTINUOUS Sima Mayes MD 50 mL/hr at 03/09/22 2049 50 mL/hr at 03/09/22 2049   · piperacillin-tazobactam (ZOSYN) 3.375 g in 0.9% sodium chloride (MBP/ADV) 100 mL MBP 3.375 g IntraVENous Q12H Lisa RIVERA MD 25 mL/hr at 03/10/22 1113 3.375 g at 03/10/22 1113   · atorvastatin (LIPITOR) tablet 40 mg 40 mg Oral QPM Lisa RIVERA MD 40 mg at 03/09/22 1704   · diclofenac (VOLTAREN) 1 % topical gel 2 g 2 g Topical QID Vana Krabbe, MD 2 g at 03/10/22 0900   · levothyroxine (SYNTHROID) tablet 100 mcg 100 mcg Oral Nick RIVERA  mcg at 03/10/22 0523   · mirtazapine (REMERON) tablet 45 mg 45 mg Oral QHS Lisa RIVERA MD 45 mg at 03/09/22 2046   · montelukast (SINGULAIR) tablet 10 mg 10 mg Oral DAILY Vana Krabbe, MD 10 mg at 03/10/22 4168   · pantoprazole (PROTONIX) tablet 40 mg 40 mg Oral DAILY Alexandra RIVERA MD 40 mg at 03/10/22 0820   · sodium chloride (NS) flush 5-40 mL 5-40 mL IntraVENous PRN Alexandra RIVERA MD    · heparin (porcine) injection 5,000 Units 5,000 Units SubCUTAneous Q12H Saad Nation MD 5,000 Units at 03/10/22 0820   · fluticasone-umeclidin-vilanter (TRELEGY ELLIPTA) inhaler 1 Puff 1 Puff Inhalation DAILY Saad Nation MD 1 Puff at 03/10/22 0934   · predniSONE (DELTASONE) tablet 20 mg 20 mg Oral DAILY WITH BREAKFAST Saad Nation MD 20 mg at 03/10/22 0820   · benzonatate (TESSALON) capsule 100 mg 100 mg Oral TID PRN Alexandra RIVERA MD    · albuterol CONCENTRATE 2.5mg/0.5 mL neb soln 2.5 mg Nebulization Q4H PRN Saad Nation MD       LABS:      3/9/2022 18:25   Vitamin B12: 567   Folate: 7.9   Ferritin: 814 (H)   TSH: 0.36      3/10/2022 06:28   Sodium: 136   Potassium: 4.7   Chloride: 101   CO2: 28   Anion gap: 7   Glucose: 90   BUN: 46 (H)   Creatinine: 1.69 (H)   BUN/Creatinine ratio: 27 (H)   Calcium: 7.7 (L)   GFR est non-AA: 29 (L)   GFR est AA: 35 (L)           Dehydration - Care Day 3 (3/9/2022) by Rut Irene RN       Review Entered Review Status   3/10/2022 12:24 Completed      Criteria Review      Care Day: 3 Care Date: 3/9/2022 Level of Care: Inpatient Floor    Guideline Day 2    Clinical Status    ( ) * Hemodynamic stability    ( ) * Mental status at baseline    ( ) * Vomiting absent or controlled    ( ) * Diarrhea absent or controlled    ( ) * Electrolyte abnormalities absent or acceptable for next level of care    ( ) * Cause of dehydration requiring inpatient treatment absent    ( ) * Renal function at baseline or acceptable for next level of care    ( ) * Discharge plans and education understood    Activity    ( ) * Ambulatory or acceptable for next level of care    Routes    ( ) * Oral hydration    ( ) * Oral medications or regimen acceptable for next level of care    ( ) * Oral diet or acceptable for next level of care    * Milestone   Additional Notes   3/9/2022      ATTENDING NOTE:       80years old with asthma, acute kidney and ureter with slight improvement in renal failure       Vitals:     03/08/22 1904 03/09/22 0743 03/09/22 0816 03/09/22 1437   BP: 111/72 117/74   95/64   Pulse: (!) 103 85   (!) 101   Resp: 18 16   16   Temp: 98.2 °F (36.8 °C) 98.3 °F (36.8 °C)   98.4 °F (36.9 °C)   SpO2: 98% 98% 96% 95%       Physical Exam:    General: Alert, cooperative, no distress, appears stated age. Eyes: Conjunctivae/corneas clear. PERRL, EOMs intact. Fundi benign   Ears: Normal TMs and external ear canals both ears. Nose: Nares normal. Septum midline. Mucosa normal. No drainage or sinus tenderness. Mouth/Throat: Lips, mucosa, and tongue normal. Teeth and gums normal.   Neck: Supple, symmetrical, trachea midline, no adenopathy, thyroid: no enlargment/tenderness/nodules, no carotid bruit and no JVD. Back:   Symmetric, no curvature. ROM normal. No CVA tenderness. Lungs:   Clear to auscultation bilaterally. Heart: Regular rate and rhythm, S1, S2 normal, no murmur, click, rub or gallop. Abdomen:   Soft, non-tender. Bowel sounds normal. No masses,  No organomegaly. Extremities: Extremities normal, atraumatic, no cyanosis or edema. Pulses: 2+ and symmetric all extremities. Skin: Skin color, texture, turgor normal. No rashes or lesions   Lymph nodes: Cervical, supraclavicular, and axillary nodes normal.   Neurologic: CNII-XII intact. Normal strength, sensation and reflexes throughout.       Assessment:       Active Problems:     Dehydration (3/7/2022)         JORDANA (acute kidney injury) ([facility] [address] (3/7/2022)      Plan:      Gentle hydration monitor electrolytes      LABS:       3/9/2022 06:21   Sodium: 136   Potassium: 4.0   Chloride: 102   CO2: 28   Anion gap: 6   Glucose: 137 (H)   BUN: 50 (H)   Creatinine: 1.73 (H)   BUN/Creatinine ratio: 29 (H)   Calcium: 7.6 (L)   GFR est non-AA: 28 (L) GFR est AA: 34 (L)   Bilirubin, total: 0.6   Protein, total: 6.5   Albumin: 2.1 (L)   Globulin: 4.4 (H)   A-G Ratio: 0.5 (L)   ALT: 15   AST: 23   Alk.  phosphatase: 97      3/9/2022 18:25   TSH: 0.36

## (undated) DEVICE — CANNULA NSL O2 AD 7 FT END-TIDAL CARBON DIOX VENTFLO

## (undated) DEVICE — ENDO KIT 1: Brand: MEDLINE INDUSTRIES, INC.

## (undated) DEVICE — MOUTHPIECE ENDOSCP 20X27MM --